# Patient Record
Sex: FEMALE | Race: BLACK OR AFRICAN AMERICAN | Employment: OTHER | URBAN - METROPOLITAN AREA
[De-identification: names, ages, dates, MRNs, and addresses within clinical notes are randomized per-mention and may not be internally consistent; named-entity substitution may affect disease eponyms.]

---

## 2017-03-14 ENCOUNTER — OFFICE VISIT (OUTPATIENT)
Dept: INTERNAL MEDICINE CLINIC | Age: 72
End: 2017-03-14

## 2017-04-03 ENCOUNTER — HOSPITAL ENCOUNTER (OUTPATIENT)
Dept: LAB | Age: 72
Discharge: HOME | End: 2017-04-03
Payer: MEDICARE

## 2017-04-03 DIAGNOSIS — E78.5 DYSLIPIDEMIA: ICD-10-CM

## 2017-04-03 LAB
CHOLEST SERPL-MCNC: 204 MG/DL
ERYTHROCYTE [DISTWIDTH] IN BLOOD BY AUTOMATED COUNT: 13.8 % (ref 11.6–14.5)
HCT VFR BLD AUTO: 37.1 % (ref 35–45)
HDLC SERPL-MCNC: 83 MG/DL (ref 40–60)
HDLC SERPL: 2.5 {RATIO} (ref 0–5)
HGB BLD-MCNC: 11.5 G/DL (ref 12–16)
LDLC SERPL CALC-MCNC: 107.2 MG/DL (ref 0–100)
LIPID PROFILE,FLP: ABNORMAL
MCH RBC QN AUTO: 29 PG (ref 24–34)
MCHC RBC AUTO-ENTMCNC: 31 G/DL (ref 31–37)
MCV RBC AUTO: 93.7 FL (ref 74–97)
PLATELET # BLD AUTO: 247 K/UL (ref 135–420)
PMV BLD AUTO: 10.8 FL (ref 9.2–11.8)
RBC # BLD AUTO: 3.96 M/UL (ref 4.2–5.3)
TRIGL SERPL-MCNC: 69 MG/DL (ref ?–150)
TSH SERPL DL<=0.05 MIU/L-ACNC: 0.73 UIU/ML (ref 0.36–3.74)
VLDLC SERPL CALC-MCNC: 13.8 MG/DL
WBC # BLD AUTO: 8.5 K/UL (ref 4.6–13.2)

## 2017-04-03 PROCEDURE — 85027 COMPLETE CBC AUTOMATED: CPT | Performed by: INTERNAL MEDICINE

## 2017-04-03 PROCEDURE — 36415 COLL VENOUS BLD VENIPUNCTURE: CPT | Performed by: INTERNAL MEDICINE

## 2017-04-03 PROCEDURE — 80061 LIPID PANEL: CPT | Performed by: INTERNAL MEDICINE

## 2017-04-03 PROCEDURE — 84443 ASSAY THYROID STIM HORMONE: CPT | Performed by: INTERNAL MEDICINE

## 2017-04-06 NOTE — PROGRESS NOTES
70 y.o. black female who presents for evaluation. She seems to be doing well. Doing an hour on the bike just about daily, she has not been going to silver sneakers    No GI or  complaints     No sx referable to the thyroid    Denies polyuria, polydipsia, nocturia, vision change. Not checking sugars at this time. Weight is back down, doiong better w diet    Vitals 4/10/2017 9/20/2016 3/16/2016 8/27/2015 5/6/2015   Weight 216 lb 226 lb 216 lb 216 lb 213 lb     Past Medical History:   Diagnosis Date    Allergic rhinitis     Anemia     chronic    Cataract     Chronic constipation     DJD (degenerative joint disease) of knee     Dr. Brendon Murray Dyslipidemia     10 year calculated risk score was 10.3% (7/13); 11.3% (10/14); 15.2% (3/16)    FHx: heart disease     Fibrocystic breast     neg bx x2 1970s    GERD (gastroesophageal reflux disease)     HTN (hypertension)     IFG (impaired fasting glucose) 2/14    Morbid obesity (HCC)     peak weight 230 lbs, bmi 38.8 from 10/11    Multinodular goiter 2007    negative biopsy Dr. Nuris Marti     Past Surgical History:   Procedure Laterality Date    DEXA BONE DENSITY STUDY AXIAL      DEXA t score 0.2 spine, -0.2 hip (9/09); -0.1 spine, -0.2 hip (3/14)    HX APPENDECTOMY      HX BREAST BIOPSY      1967 (RT)/ 1971 (LT)   Max Miller negative 2002, Dr. Martha Marin negative 4/3/12    HX DILATION AND CURETTAGE      x2     HX HEMORRHOIDECTOMY      HX HYSTERECTOMY      due to  leimyomata    HX PELVIC LAPAROSCOPY       Allergies   Allergen Reactions    Codeine Itching     \"Loopy\"     Current Outpatient Prescriptions   Medication Sig    pravastatin (PRAVACHOL) 20 mg tablet Take 1 Tab by mouth nightly.  triamterene-hydroCHLOROthiazide (MAXZIDE) 75-50 mg per tablet TAKE 1 TABLET EVERY DAY    fluticasone (FLONASE) 50 mcg/actuation nasal spray USE 2 SPRAYS IN EACH NOSTRIL DAILY     No current facility-administered medications for this visit.       LAST MEDICARE WELLNESS EXAM: 3/5/14, 8/27/15, 9/20/16  ACP 9/20/16    REVIEW OF SYSTEMS: gyn Dr Sewell Service 3/14, mammo 10/16, DEXA 3/14, colo 2012 Dr Ela Lyons, sees Dr Katia Singhtingham  Ophtho  no vision change or eye pain  Oral  no mouth pain, tongue or tooth problems  Ears  no hearing loss, ear pain, fullness, no swallowing problems  Cardiac  no CP, PND, orthopnea, edema, palpitations or syncope  Chest  no breast masses  Resp  no wheezing, chronic coughing, dyspnea  GI  no heartburn, nausea, vomiting, change in bowel habits, bleeding, hemorrhoids  Urinary  no dysuria, hematuria, flank pain, urgency, frequency  Genitals  no genital lesions, discharge, masses, ulceration, warts  Ortho  no swelling, dec ROM, myalgias  Derm  no nail abnormalities, rashes, lesions of note, hair loss  Psych  denies any anxiety or depression symptoms, no hallucinations or violent ideation  Constitutional  no wt loss, night sweats, unexplained fevers  Neuro  no focal weakness, numbness, paresthesias, incoordination, ataxia, involuntary movements  Endo - no polyuria, polydipsia, nocturia, hot flashes    Visit Vitals    /80    Pulse 91    Temp 97.9 °F (36.6 °C) (Oral)    Ht 5' 6\" (1.676 m)    Wt 216 lb (98 kg)    SpO2 94%    BMI 34.86 kg/m2     A&O x3  Affect is appropriate. Mood stable  No apparent distress  Anicteric, no JVD, adenopathy. Goiter noted   No carotid bruits or radiated murmur  Lungs clear to auscultation, no wheezes or rales  Heart showed regular rate and rhythm. No murmur, rubs, gallops  Abdomen soft nontender, no hepatosplenomegaly or masses. Extremities without edema.   Pulses 1-2+ symmetrically    LABS  From 12/08                                      ua neg  From 7/09 showed   gluc 98,   cr 0.90,     alt 10,          chol 223, tg 75, hdl 71, ldl-c 137  From 12/11 showed               ldl-p 1525, chol 247, tg 61, hdl 78, ldl-c 157, wbc 7.6, hb 11.7, plt 265  From 6/12 showed                   ldl-p 1177, chol 238, tg 48, hdl 78, ldl-c 152,           tsh 1.81  From 1/13 showed   gluc 99,   cr 0.95, gfr 72,  alt<5,  ldl-p 1238, chol 206, tg 56, hdl 75, ldl-c 120, wbc 8.2, hb 11.4, plt 259  From 7/13 showed               chol 221, tg 72, hdl 72, ldl-c 135  From 2/14 showed   gluc 101, cr 0.78, gfr 90,                   tsh 2.04  From 10/14 showed gluc 94,   cr 1.02, gfr>60, alt 5,   hba1c 6.2, chol 231, tg 73, hdl 83, ldl-c 133, wbc 7.9, hb 11.9, plt 281  From 3/16 showed   gluc 107, cr 0.75, gfr>60, alt 11, hba1c 5.2, chol 229, tg 74, hdl 90, ldl-c 124, wbc 7.6, hb 12.0, plt 256, tsh 1.22, ua neg  From 9/16 showed   gluc 105, cr 0.89, gfr>60, alt 14,         chol 198, tg 54, hdl 93, ldl-c 94    Results for orders placed or performed during the hospital encounter of 04/03/17   LIPID PANEL   Result Value Ref Range    LIPID PROFILE          Cholesterol, total 204 (H) <200 MG/DL    Triglyceride 69 <150 MG/DL    HDL Cholesterol 83 (H) 40 - 60 MG/DL    LDL, calculated 107.2 (H) 0 - 100 MG/DL    VLDL, calculated 13.8 MG/DL    CHOL/HDL Ratio 2.5 0 - 5.0     CBC W/O DIFF   Result Value Ref Range    WBC 8.5 4.6 - 13.2 K/uL    RBC 3.96 (L) 4.20 - 5.30 M/uL    HGB 11.5 (L) 12.0 - 16.0 g/dL    HCT 37.1 35.0 - 45.0 %    MCV 93.7 74.0 - 97.0 FL    MCH 29.0 24.0 - 34.0 PG    MCHC 31.0 31.0 - 37.0 g/dL    RDW 13.8 11.6 - 14.5 %    PLATELET 465 619 - 895 K/uL    MPV 10.8 9.2 - 11.8 FL   TSH 3RD GENERATION   Result Value Ref Range    TSH 0.73 0.36 - 3.74 uIU/mL     Patient Active Problem List   Diagnosis Code    Multinodular goiter neg biopsy 2007 Dr. Arredondo Pay E04.2    Dyslipidemia E78.5    IFG (impaired fasting glucose) R73.01    Essential hypertension I10    Degenerative arthritis of knee, bilateral M17.0    GERD without esophagitis K21.9    Advance directive in chart Z78.9    Obesity (BMI 30-39. 9) E66.9     ASSESSMENT AND PLAN:   1. Hypertension. Continue current regimen. 2.  Obesity. Diet and lifestyle changes reiterated. Congratulated her on the wt loss she did attain. Declined flavio suppressants previously  3. Allergic rhinitis. Prn antihistamines and flonase  4. Fibrocystic breasts. F/U GYN and mammos  5. DJD. Prn nsaids  6. General.  Ca/D advocated. 7.  Thyroid. Doing well  8. Dyslipidemia. Inc to prava 20  9. IFG. Wt loss, exercise and diet, chck labs at her convenience        RTC 10/17    Above conditions discussed at length and patient vocalized understanding.   All questions answered to patient satifaction

## 2017-04-10 ENCOUNTER — OFFICE VISIT (OUTPATIENT)
Dept: INTERNAL MEDICINE CLINIC | Age: 72
End: 2017-04-10

## 2017-04-10 VITALS
HEART RATE: 91 BPM | WEIGHT: 216 LBS | HEIGHT: 66 IN | OXYGEN SATURATION: 94 % | SYSTOLIC BLOOD PRESSURE: 126 MMHG | DIASTOLIC BLOOD PRESSURE: 80 MMHG | BODY MASS INDEX: 34.72 KG/M2 | TEMPERATURE: 97.9 F

## 2017-04-10 DIAGNOSIS — K21.9 GERD WITHOUT ESOPHAGITIS: ICD-10-CM

## 2017-04-10 DIAGNOSIS — E78.5 DYSLIPIDEMIA: Primary | ICD-10-CM

## 2017-04-10 DIAGNOSIS — D64.9 CHRONIC ANEMIA: ICD-10-CM

## 2017-04-10 DIAGNOSIS — R73.01 IFG (IMPAIRED FASTING GLUCOSE): ICD-10-CM

## 2017-04-10 DIAGNOSIS — E66.9 OBESITY (BMI 30-39.9): ICD-10-CM

## 2017-04-10 DIAGNOSIS — E04.2 MULTINODULAR GOITER: ICD-10-CM

## 2017-04-10 DIAGNOSIS — I10 ESSENTIAL HYPERTENSION: ICD-10-CM

## 2017-04-10 RX ORDER — PRAVASTATIN SODIUM 20 MG/1
20 TABLET ORAL
Qty: 90 TAB | Refills: 3 | Status: SHIPPED | OUTPATIENT
Start: 2017-04-10 | End: 2018-03-14 | Stop reason: SDUPTHER

## 2017-04-10 NOTE — PROGRESS NOTES
1. Have you been to the ER, urgent care clinic or hospitalized since your last visit? NO.     2. Have you seen or consulted any other health care providers outside of the [address] [place] [address] since your last visit (Include any pap smears or colon screening)? NO      Do you have an Advanced Directive? NO    Would you like information on Advanced Directives?  YES

## 2017-10-09 ENCOUNTER — HOSPITAL ENCOUNTER (OUTPATIENT)
Dept: LAB | Age: 72
Discharge: HOME | End: 2017-10-09
Payer: MEDICARE

## 2017-10-09 DIAGNOSIS — D64.9 CHRONIC ANEMIA: ICD-10-CM

## 2017-10-09 DIAGNOSIS — E78.5 DYSLIPIDEMIA: ICD-10-CM

## 2017-10-09 DIAGNOSIS — R73.01 IFG (IMPAIRED FASTING GLUCOSE): ICD-10-CM

## 2017-10-09 LAB
ALBUMIN SERPL-MCNC: 3.6 G/DL (ref 3.4–5)
ALBUMIN/GLOB SERPL: 1.1 {RATIO} (ref 0.8–1.7)
ALP SERPL-CCNC: 99 U/L (ref 45–117)
ALT SERPL-CCNC: 14 U/L (ref 13–56)
ANION GAP SERPL CALC-SCNC: 9 MMOL/L (ref 3–18)
AST SERPL-CCNC: 11 U/L (ref 15–37)
BILIRUB SERPL-MCNC: 0.6 MG/DL (ref 0.2–1)
BUN SERPL-MCNC: 19 MG/DL (ref 7–18)
BUN/CREAT SERPL: 21 (ref 12–20)
CALCIUM SERPL-MCNC: 9.2 MG/DL (ref 8.5–10.1)
CHLORIDE SERPL-SCNC: 102 MMOL/L (ref 100–108)
CHOLEST SERPL-MCNC: 216 MG/DL
CO2 SERPL-SCNC: 31 MMOL/L (ref 21–32)
CREAT SERPL-MCNC: 0.9 MG/DL (ref 0.6–1.3)
ERYTHROCYTE [DISTWIDTH] IN BLOOD BY AUTOMATED COUNT: 13.9 % (ref 11.6–14.5)
GLOBULIN SER CALC-MCNC: 3.4 G/DL (ref 2–4)
GLUCOSE SERPL-MCNC: 107 MG/DL (ref 74–99)
HBA1C MFR BLD: 5.9 % (ref 4.2–5.6)
HCT VFR BLD AUTO: 37.7 % (ref 35–45)
HDLC SERPL-MCNC: 99 MG/DL (ref 40–60)
HDLC SERPL: 2.2 {RATIO} (ref 0–5)
HGB BLD-MCNC: 11.7 G/DL (ref 12–16)
LDLC SERPL CALC-MCNC: 103.4 MG/DL (ref 0–100)
LIPID PROFILE,FLP: ABNORMAL
MCH RBC QN AUTO: 28.8 PG (ref 24–34)
MCHC RBC AUTO-ENTMCNC: 31 G/DL (ref 31–37)
MCV RBC AUTO: 92.9 FL (ref 74–97)
PLATELET # BLD AUTO: 262 K/UL (ref 135–420)
PMV BLD AUTO: 10.9 FL (ref 9.2–11.8)
POTASSIUM SERPL-SCNC: 3.2 MMOL/L (ref 3.5–5.5)
PROT SERPL-MCNC: 7 G/DL (ref 6.4–8.2)
RBC # BLD AUTO: 4.06 M/UL (ref 4.2–5.3)
SODIUM SERPL-SCNC: 142 MMOL/L (ref 136–145)
TRIGL SERPL-MCNC: 68 MG/DL (ref ?–150)
VLDLC SERPL CALC-MCNC: 13.6 MG/DL
WBC # BLD AUTO: 6.8 K/UL (ref 4.6–13.2)

## 2017-10-09 PROCEDURE — 80053 COMPREHEN METABOLIC PANEL: CPT | Performed by: INTERNAL MEDICINE

## 2017-10-09 PROCEDURE — 36415 COLL VENOUS BLD VENIPUNCTURE: CPT | Performed by: INTERNAL MEDICINE

## 2017-10-09 PROCEDURE — 83036 HEMOGLOBIN GLYCOSYLATED A1C: CPT | Performed by: INTERNAL MEDICINE

## 2017-10-09 PROCEDURE — 80061 LIPID PANEL: CPT | Performed by: INTERNAL MEDICINE

## 2017-10-09 PROCEDURE — 85027 COMPLETE CBC AUTOMATED: CPT | Performed by: INTERNAL MEDICINE

## 2017-10-16 NOTE — PROGRESS NOTES
This is a Subsequent Medicare Annual Wellness Visit     I have reviewed the patient's medical history in detail and updated the computerized patient record. History     Past Medical History:   Diagnosis Date    Allergic rhinitis     Anemia     chronic    Cataract     Chronic constipation     DJD (degenerative joint disease) of knee     Dr. Lorne Cross Dyslipidemia     10 year calculated risk score was 10.3% (7/13); 11.3% (10/14); 15.2% (3/16)    FHx: heart disease     Fibrocystic breast     neg bx x2 1970s    GERD (gastroesophageal reflux disease)     HTN (hypertension)     IFG (impaired fasting glucose) 2/14    Morbid obesity (HCC)     peak weight 230 lbs, bmi 38.8 from 10/11    Multinodular goiter 2007    negative biopsy Dr. Minh Pedersen      Past Surgical History:   Procedure Laterality Date    DEXA BONE DENSITY STUDY AXIAL      DEXA t score 0.2 spine, -0.2 hip (9/09); -0.1 spine, -0.2 hip (3/14)    HX APPENDECTOMY      HX BREAST BIOPSY      1967 (RT)/ 1971 (LT)   Claudia Proud Dr. Burgess Sandifer negative 2002, Dr. Mami Canela negative 4/3/12    HX DILATION AND CURETTAGE      x2     HX HEMORRHOIDECTOMY      HX HYSTERECTOMY      due to  leimyomata    HX PELVIC LAPAROSCOPY       Current Outpatient Prescriptions   Medication Sig Dispense Refill    pravastatin (PRAVACHOL) 20 mg tablet Take 1 Tab by mouth nightly.  90 Tab 3    triamterene-hydroCHLOROthiazide (MAXZIDE) 75-50 mg per tablet TAKE 1 TABLET EVERY DAY 90 Tab 3    fluticasone (FLONASE) 50 mcg/actuation nasal spray USE 2 SPRAYS IN EACH NOSTRIL DAILY 48 g 3     Allergies   Allergen Reactions    Codeine Itching     \"Loopy\"     Family History   Problem Relation Age of Onset    Hypertension Mother     Hypertension Father     Cancer Father      pancreatic    Hypertension Brother     Cancer Brother 46     lung    Hypertension Sister     Diabetes Brother     Heart Disease Sister      Social History   Substance Use Topics    Smoking status: Never Smoker    Smokeless tobacco: Never Used    Alcohol use No     Depression Risk Factor Screening:     PHQ over the last two weeks 10/17/2017   Little interest or pleasure in doing things Not at all   Feeling down, depressed or hopeless Not at all   Total Score PHQ 2 0     SCREENINGS  Colonoscopy last done 2012 Dr Suhas Felix last done 10/17  DEXA last done 3/14  Gyn last done >5 yrs    Immunization History   Administered Date(s) Administered    H1N1 Influenza Virus Vaccine 12/01/2009    Influenza High Dose Vaccine PF 11/19/2013, 09/20/2016    Influenza Vaccine 10/31/2014, 11/19/2015    Influenza Vaccine Whole 09/20/2011    PPD 09/24/2012    Pneumococcal Conjugate (PCV-13) 08/27/2015    TD Vaccine 01/01/1999    Tdap 10/01/2015    ZZZ-RETIRED (DO NOT USE) Pneumococcal Vaccine (Unspecified Type) 12/20/2011    Zoster 12/20/2011     Alcohol Risk Factor Screening: On any occasion during the past 3 months, have you had more than 3 drinks containing alcohol? No    Do you average more than 7 drinks per week? No      Functional Ability and Level of Safety:     Hearing Loss   normal-to-mild    Vision - no acute complaints and is followed by Dr Chirinos [facility]   Self-care. Requires assistance with: no ADLs    Fall Risk     Fall Risk Assessment, last 12 mths 10/17/2017   Able to walk? Yes   Fall in past 12 months? No     Abuse Screen   Patient is not abused    Review of Systems   A comprehensive review of systems was negative except for that written in the HPI.     Physical Examination     Evaluation of Cognitive Function:  Mood/affect:  neutral  Appearance: age appropriate, overweight, well dressed and within normal Limits  Family member/caregiver input: na    Patient Care Team:  Julien Malik MD as PCP - General (Internal Medicine)  Varsha Rubio RN as Ambulatory Care Navigator (Internal Medicine)    Advice/Referrals/Counseling   Education and counseling provided:  Are appropriate based on today's review and evaluation  End-of-Life planning (with patient's consent)  Pneumococcal Vaccine  Influenza Vaccine  Screening Mammography  Colorectal cancer screening tests  Cardiovascular screening blood test  Bone mass measurement (DEXA)  Diabetes screening test    Assessment/Plan       ICD-10-CM ICD-9-CM    1. Medicare annual wellness visit, subsequent Z00.00 V70.0    2. Advanced directives, counseling/discussion Z71.89 V65.49 ADVANCE CARE PLANNING FIRST 30 MINS   3. Screening for alcoholism Z13.89 V79.1 FL ANNUAL ALCOHOL SCREEN 15 MIN   4. Screening for depression Z13.89 V79.0 DEPRESSION SCREEN ANNUAL   5. Screen for colon cancer Z12.11 V76.51    6. Screening for diabetes mellitus Z13.1 V77.1    7. Screening for ischemic heart disease Z13.6 V81.0    8. Encounter for screening mammogram for malignant neoplasm of breast Z12.31 V76.12    9. Body mass index 34.0-34.9, adult Z68.34 V85.34    10. Encounter for immunization Z23 V03.89 INFLUENZA VIRUS VACCINE, HIGH DOSE SEASONAL, PRESERVATIVE FREE      ADMIN INFLUENZA VIRUS VAC   11. Obesity (BMI 30-39. 9) E66.9 278.00    12. Essential hypertension I10 401.9    13. GERD without esophagitis K21.9 530.81    14. IFG (impaired fasting glucose) R73.01 790.21 HEMOGLOBIN A1C W/O EAG   15. Dyslipidemia E78.5 272.4    16. Hypokalemia C45.4 314.5 METABOLIC PANEL, BASIC      MAGNESIUM   17. Multinodular goiter neg biopsy 2007 Dr. Rudolph Bone E04.2 241.1 TSH 3RD GENERATION      T4, FREE     current treatment plan is effective, no change in therapy  lab results and schedule of future lab studies reviewed with patient  reviewed diet, exercise and weight control  cardiovascular risk and specific lipid/LDL goals reviewed. End of life discussion undertaken.   amd on file  Flu high dose given  Colonoscopy to be scheduled 2022  Mammo to be scheduled 2018  DEXA deferred til 2019 at earliest

## 2017-10-16 NOTE — PATIENT INSTRUCTIONS

## 2017-10-16 NOTE — PROGRESS NOTES
70 y.o. black female who presents for evaluation. She seems to be doing well. Doing an hour on the bike just about daily, and she continues to try to go to the [facility] for silver sneakers although not as regular with that. Her daughter boiught her a Fitbit but not wearing regularly, she averages around 8K steps when she does    No GI or  complaints     No sx referable to the thyroid    Denies polyuria, polydipsia, nocturia, vision change. Not checking sugars at this time. Weight has continued to go down the past year, 22 lbs now! They're not eating out as much as they used to apparently and the increased activity mentioned above    Vitals 10/17/2017 4/10/2017 9/20/2016   Weight 204 lb 216 lb 226 lb     Past Medical History:   Diagnosis Date    Allergic rhinitis     Anemia     chronic    Cataract     Chronic constipation     DJD (degenerative joint disease) of knee     Dr. Stella Palencia Dyslipidemia     10 year calculated risk score was 10.3% (7/13); 11.3% (10/14); 15.2% (3/16)    FHx: heart disease     Fibrocystic breast     neg bx x2 1970s    GERD (gastroesophageal reflux disease)     HTN (hypertension)     IFG (impaired fasting glucose) 2/14    Morbid obesity (HCC)     peak weight 230 lbs, bmi 38.8 from 10/11    Multinodular goiter 2007    negative biopsy Dr. Chapito Alvarez     Past Surgical History:   Procedure Laterality Date    DEXA BONE DENSITY STUDY AXIAL      DEXA t score 0.2 spine, -0.2 hip (9/09); -0.1 spine, -0.2 hip (3/14)    HX APPENDECTOMY      HX BREAST BIOPSY      1967 (RT)/ 1971 (LT)   Donna Foreman negative 2002, Dr. Darshana Marino negative 4/3/12    HX DILATION AND CURETTAGE      x2     HX HEMORRHOIDECTOMY      HX HYSTERECTOMY      due to  leimyomata    HX PELVIC LAPAROSCOPY       Allergies   Allergen Reactions    Codeine Itching     \"Loopy\"     Current Outpatient Prescriptions   Medication Sig    pravastatin (PRAVACHOL) 20 mg tablet Take 1 Tab by mouth nightly.     triamterene-hydroCHLOROthiazide (MAXZIDE) 75-50 mg per tablet TAKE 1 TABLET EVERY DAY    fluticasone (FLONASE) 50 mcg/actuation nasal spray USE 2 SPRAYS IN EACH NOSTRIL DAILY     No current facility-administered medications for this visit. LAST MEDICARE WELLNESS EXAM: 3/5/14, 8/27/15, 9/20/16, 10/17/17  ACP 9/20/16, 10/17/17    REVIEW OF SYSTEMS: gyn Dr Deanna Boyer 3/14, mammo 10/17, DEXA 3/14, colo 2012 Dr José Garay, sees Dr Nory Jeff  Ophtho  no vision change or eye pain  Oral  no mouth pain, tongue or tooth problems  Ears  no hearing loss, ear pain, fullness, no swallowing problems  Cardiac  no CP, PND, orthopnea, edema, palpitations or syncope  Chest  no breast masses  Resp  no wheezing, chronic coughing, dyspnea  GI  no heartburn, nausea, vomiting, change in bowel habits, bleeding, hemorrhoids  Urinary  no dysuria, hematuria, flank pain, urgency, frequency  Genitals  no genital lesions, discharge, masses, ulceration, warts  Ortho  no swelling, dec ROM, myalgias  Derm  no nail abnormalities, rashes, lesions of note, hair loss  Psych  denies any anxiety or depression symptoms, no hallucinations or violent ideation  Constitutional  no wt loss, night sweats, unexplained fevers  Neuro  no focal weakness, numbness, paresthesias, incoordination, ataxia, involuntary movements  Endo - no polyuria, polydipsia, nocturia, hot flashes    Visit Vitals    /82    Pulse 69    Temp 98.8 °F (37.1 °C)    Resp 14    Ht 5' 6\" (1.676 m)    Wt 204 lb (92.5 kg)    SpO2 99%    BMI 32.93 kg/m2     A&O x3  Affect is appropriate. Mood stable  No apparent distress  Anicteric, no JVD, adenopathy. Goiter noted   No carotid bruits or radiated murmur  Lungs clear to auscultation, no wheezes or rales  Heart showed regular rate and rhythm. No murmur, rubs, gallops  Abdomen soft nontender, no hepatosplenomegaly or masses. Extremities without edema.   Pulses 1-2+ symmetrically    LABS  From 12/08 ua neg  From 7/09 showed   gluc 98,   cr 0.90,     alt 10,          chol 223, tg 75, hdl 71, ldl-c 137  From 12/11 showed               ldl-p 1525, chol 247, tg 61, hdl 78, ldl-c 157, wbc 7.6, hb 11.7, plt 265  From 6/12 showed                   ldl-p 1177, chol 238, tg 48, hdl 78, ldl-c 152,           tsh 1.81  From 1/13 showed   gluc 99,   cr 0.95, gfr 72,  alt<5,  ldl-p 1238, chol 206, tg 56, hdl 75, ldl-c 120, wbc 8.2, hb 11.4, plt 259  From 7/13 showed               chol 221, tg 72, hdl 72, ldl-c 135  From 2/14 showed   gluc 101, cr 0.78, gfr 90,                   tsh 2.04  From 10/14 showed gluc 94,   cr 1.02, gfr>60, alt 5,   hba1c 6.2, chol 231, tg 73, hdl 83, ldl-c 133, wbc 7.9, hb 11.9, plt 281  From 3/16 showed   gluc 107, cr 0.75, gfr>60, alt 11, hba1c 5.2, chol 229, tg 74, hdl 90, ldl-c 124, wbc 7.6, hb 12.0, plt 256, tsh 1.22, ua neg  From 9/16 showed   gluc 105, cr 0.89, gfr>60, alt 14,         chol 198, tg 54, hdl 93, ldl-c 94  From 4/17 showed               chol 204, tg 69, hdl 83, ldl-c 107, wbc 8.5, hb 11.5, plt 247, tsh 0.73    Results for orders placed or performed during the hospital encounter of 10/09/17   LIPID PANEL   Result Value Ref Range    LIPID PROFILE          Cholesterol, total 216 (H) <200 MG/DL    Triglyceride 68 <150 MG/DL    HDL Cholesterol 99 (H) 40 - 60 MG/DL    LDL, calculated 103.4 (H) 0 - 100 MG/DL    VLDL, calculated 13.6 MG/DL    CHOL/HDL Ratio 2.2 0 - 5.0     CBC W/O DIFF   Result Value Ref Range    WBC 6.8 4.6 - 13.2 K/uL    RBC 4.06 (L) 4.20 - 5.30 M/uL    HGB 11.7 (L) 12.0 - 16.0 g/dL    HCT 37.7 35.0 - 45.0 %    MCV 92.9 74.0 - 97.0 FL    MCH 28.8 24.0 - 34.0 PG    MCHC 31.0 31.0 - 37.0 g/dL    RDW 13.9 11.6 - 14.5 %    PLATELET 193 148 - 888 K/uL    MPV 10.9 9.2 - 56.0 FL   METABOLIC PANEL, COMPREHENSIVE   Result Value Ref Range    Sodium 142 136 - 145 mmol/L    Potassium 3.2 (L) 3.5 - 5.5 mmol/L    Chloride 102 100 - 108 mmol/L    CO2 31 21 - 32 mmol/L Anion gap 9 3.0 - 18 mmol/L    Glucose 107 (H) 74 - 99 mg/dL    BUN 19 (H) 7.0 - 18 MG/DL    Creatinine 0.90 0.6 - 1.3 MG/DL    BUN/Creatinine ratio 21 (H) 12 - 20      GFR est AA >60 >60 ml/min/1.73m2    GFR est non-AA >60 >60 ml/min/1.73m2    Calcium 9.2 8.5 - 10.1 MG/DL    Bilirubin, total 0.6 0.2 - 1.0 MG/DL    ALT (SGPT) 14 13 - 56 U/L    AST (SGOT) 11 (L) 15 - 37 U/L    Alk. phosphatase 99 45 - 117 U/L    Protein, total 7.0 6.4 - 8.2 g/dL    Albumin 3.6 3.4 - 5.0 g/dL    Globulin 3.4 2.0 - 4.0 g/dL    A-G Ratio 1.1 0.8 - 1.7     HEMOGLOBIN A1C W/O EAG   Result Value Ref Range    Hemoglobin A1c 5.9 (H) 4.2 - 5.6 %     Patient Active Problem List   Diagnosis Code    Multinodular goiter neg biopsy 2007 Dr. Minh Pedersen E04.2    Dyslipidemia E78.5    IFG (impaired fasting glucose) R73.01    Essential hypertension I10    Degenerative arthritis of knee, bilateral M17.0    GERD without esophagitis K21.9    Advance directive in chart Z78.9    Obesity (BMI 30-39. 9) E66.9     ASSESSMENT AND PLAN:   1. Hypertension. Continue current regimen. 2.  Obesity. Diet and lifestyle changes reiterated. Congratulated her on the wt loss. Declined flavio suppressants previously  3. Allergic rhinitis. Prn antihistamines and flonase  4. Fibrocystic breasts. F/U GYN and mammos  5. DJD. Prn nsaids  6. General.  Ca/D advocated. 7.  Thyroid. Doing well  8. Dyslipidemia. Continue current regimen. 9.  IFG. Wt loss, exercise and diet, doing well  10. Hypokalemia. Declined kdur, she wants to try otc supp and inc dietary k intake. Check mg next visit also  11. In passing, we discussed options for her right bunion which is really more a cosmetic issue at this time        RTC 4/18    Above conditions discussed at length and patient vocalized understanding.   All questions answered to patient satifaction

## 2017-10-16 NOTE — PROGRESS NOTES
This is a Subsequent Medicare Annual Wellness     I have reviewed the patient's medical history in detail and updated the computerized patient record. History     Past Medical History:   Diagnosis Date    Allergic rhinitis     Anemia     chronic    Cataract     Chronic constipation     DJD (degenerative joint disease) of knee     Dr. Alina Kumari Dyslipidemia     10 year calculated risk score was 10.3% (7/13); 11.3% (10/14); 15.2% (3/16)    FHx: heart disease     Fibrocystic breast     neg bx x2 1970s    GERD (gastroesophageal reflux disease)     HTN (hypertension)     IFG (impaired fasting glucose) 2/14    Morbid obesity (HCC)     peak weight 230 lbs, bmi 38.8 from 10/11    Multinodular goiter 2007    negative biopsy Dr. Sandra Cosby      Past Surgical History:   Procedure Laterality Date    DEXA BONE DENSITY STUDY AXIAL      DEXA t score 0.2 spine, -0.2 hip (9/09); -0.1 spine, -0.2 hip (3/14)    HX APPENDECTOMY      HX BREAST BIOPSY      1967 (RT)/ 1971 (LT)   Marie Maguire negative 2002, Dr. Blanca Berman negative 4/3/12    HX DILATION AND CURETTAGE      x2     HX HEMORRHOIDECTOMY      HX HYSTERECTOMY      due to  leimyomata    HX PELVIC LAPAROSCOPY       Current Outpatient Prescriptions   Medication Sig Dispense Refill    pravastatin (PRAVACHOL) 20 mg tablet Take 1 Tab by mouth nightly.  90 Tab 3    triamterene-hydroCHLOROthiazide (MAXZIDE) 75-50 mg per tablet TAKE 1 TABLET EVERY DAY 90 Tab 3    fluticasone (FLONASE) 50 mcg/actuation nasal spray USE 2 SPRAYS IN EACH NOSTRIL DAILY 48 g 3     Allergies   Allergen Reactions    Codeine Itching     \"Loopy\"     Family History   Problem Relation Age of Onset    Hypertension Mother     Hypertension Father     Cancer Father      pancreatic    Hypertension Brother     Cancer Brother 46     lung    Hypertension Sister     Diabetes Brother     Heart Disease Sister      Social History   Substance Use Topics    Smoking status: Never Smoker    Smokeless tobacco: Never Used    Alcohol use No     Depression Risk Factor Screening:     PHQ over the last two weeks 4/10/2017   Little interest or pleasure in doing things Not at all   Feeling down, depressed or hopeless Not at all   Total Score PHQ 2 0     SCREENINGS  Colonoscopy last done 2012 Dr Bobby Aguila last done 10/17  DEXA last done 3/14  Gyn last done >5 yrs    Immunization History   Administered Date(s) Administered    H1N1 Influenza Virus Vaccine 12/01/2009    Influenza High Dose Vaccine PF 11/19/2013, 09/20/2016    Influenza Vaccine 10/31/2014, 11/19/2015    Influenza Vaccine Whole 09/20/2011    PPD 09/24/2012    Pneumococcal Conjugate (PCV-13) 08/27/2015    TD Vaccine 01/01/1999    Tdap 10/01/2015    ZZZ-RETIRED (DO NOT USE) Pneumococcal Vaccine (Unspecified Type) 12/20/2011    Zoster 12/20/2011     Alcohol Risk Factor Screening: On any occasion during the past 3 months, have you had more than 3 drinks containing alcohol? No    Do you average more than 7 drinks per week? No      Functional Ability and Level of Safety:     Hearing Loss   normal-to-mild    Vision - no acute complaints and is followed by Dr Navdeep Morel [facility]   Self-care. Requires assistance with: no ADLs    Fall Risk     Fall Risk Assessment, last 12 mths 4/10/2017   Able to walk? Yes   Fall in past 12 months? No     Abuse Screen   Patient is not abused    Review of Systems   A comprehensive review of systems was negative except for that written in the HPI.     Physical Examination     Evaluation of Cognitive Function:  Mood/affect:  neutral  Appearance: age appropriate, overweight, well dressed and within normal Limits  Family member/caregiver input: na    Patient Care Team:  Martha Nguyen MD as PCP - General (Internal Medicine)  Ria Greenwood RN as Ambulatory Care Navigator (Internal Medicine)    Advice/Referrals/Counseling   Education and counseling provided:  Are appropriate based on today's review and evaluation  End-of-Life planning (with patient's consent)  Pneumococcal Vaccine  Influenza Vaccine  Screening Mammography  Colorectal cancer screening tests  Cardiovascular screening blood test  Bone mass measurement (DEXA)  Diabetes screening test    Assessment/Plan       ICD-10-CM ICD-9-CM    1. Medicare annual wellness visit, subsequent Z00.00 V70.0    2. Advanced directives, counseling/discussion Z71.89 V65.49 ADVANCE CARE PLANNING FIRST 30 MINS   3. Screening for alcoholism Z13.89 V79.1 DC ANNUAL ALCOHOL SCREEN 15 MIN   4. Screening for depression Z13.89 V79.0 DEPRESSION SCREEN ANNUAL   5. Screen for colon cancer Z12.11 V76.51    6. Screening for diabetes mellitus Z13.1 V77.1    7. Screening for ischemic heart disease Z13.6 V81.0    8. Encounter for screening mammogram for malignant neoplasm of breast Z12.31 V76.12    9. Body mass index 34.0-34.9, adult Z68.34 V85.34      current treatment plan is effective, no change in therapy  lab results and schedule of future lab studies reviewed with patient  reviewed diet, exercise and weight control  cardiovascular risk and specific lipid/LDL goals reviewed. End of life discussion undertaken.   amd on file  Flu high dose when in season  Colonoscopy to be scheduled 2022  Mammo to be scheduled 2018  DEXA deferred

## 2017-10-16 NOTE — ACP (ADVANCE CARE PLANNING)
Advance Care Planning    Advance Care Planning (ACP) Provider Note - Comprehensive     Date of ACP Conversation: 10/17/17  Persons included in Conversation:  patient  Length of ACP Conversation in minutes:  16 minutes    Authorized Decision Maker (if patient is incapable of making informed decisions): This person is:   Healthcare Agent/Medical Power of  under Advance Directive          General ACP for ALL Patients with Decision Making Capacity:   Importance of advance care planning, including choosing a healthcare agent to communicate patient's healthcare decisions if patient lost the ability to make decisions, such as after a sudden illness or accident  Understanding of the healthcare agent role was assessed and information provided  Exploration of values, goals, and preferences if recovery is not expected, even with continued medical treatment in the event of: Imminent death  Severe, permanent brain injury    Review of Existing Advance Directive:  Does this advance directive still reflect your preferences? Yes (Provide new form/Refer for assistance in updating)    For Serious or Chronic Illness:  Understanding of medical condition    Understanding of CPR, goals and expected outcomes, benefits and burdens discussed.     Interventions Provided:  Recommended completion of Advance Directive form after review of ACP materials and conversation with prospective healthcare agent   Recommended communicating the plan and making copies for the healthcare agent, personal physician, and others as appropriate (e.g., health system)  Recommended review of completed ACP document annually or upon change in health status

## 2017-10-17 ENCOUNTER — OFFICE VISIT (OUTPATIENT)
Dept: INTERNAL MEDICINE CLINIC | Age: 72
End: 2017-10-17

## 2017-10-17 VITALS
OXYGEN SATURATION: 99 % | TEMPERATURE: 98.8 F | DIASTOLIC BLOOD PRESSURE: 82 MMHG | SYSTOLIC BLOOD PRESSURE: 118 MMHG | WEIGHT: 204 LBS | HEIGHT: 66 IN | HEART RATE: 69 BPM | RESPIRATION RATE: 14 BRPM | BODY MASS INDEX: 32.78 KG/M2

## 2017-10-17 DIAGNOSIS — R73.01 IFG (IMPAIRED FASTING GLUCOSE): ICD-10-CM

## 2017-10-17 DIAGNOSIS — E04.2 MULTINODULAR GOITER: ICD-10-CM

## 2017-10-17 DIAGNOSIS — Z13.39 SCREENING FOR ALCOHOLISM: ICD-10-CM

## 2017-10-17 DIAGNOSIS — Z12.11 SCREEN FOR COLON CANCER: ICD-10-CM

## 2017-10-17 DIAGNOSIS — E87.6 HYPOKALEMIA: ICD-10-CM

## 2017-10-17 DIAGNOSIS — E78.5 DYSLIPIDEMIA: ICD-10-CM

## 2017-10-17 DIAGNOSIS — Z13.1 SCREENING FOR DIABETES MELLITUS: ICD-10-CM

## 2017-10-17 DIAGNOSIS — Z12.31 ENCOUNTER FOR SCREENING MAMMOGRAM FOR MALIGNANT NEOPLASM OF BREAST: ICD-10-CM

## 2017-10-17 DIAGNOSIS — Z71.89 ADVANCED DIRECTIVES, COUNSELING/DISCUSSION: ICD-10-CM

## 2017-10-17 DIAGNOSIS — K21.9 GERD WITHOUT ESOPHAGITIS: ICD-10-CM

## 2017-10-17 DIAGNOSIS — Z13.6 SCREENING FOR ISCHEMIC HEART DISEASE: ICD-10-CM

## 2017-10-17 DIAGNOSIS — Z13.31 SCREENING FOR DEPRESSION: ICD-10-CM

## 2017-10-17 DIAGNOSIS — E66.9 OBESITY (BMI 30-39.9): ICD-10-CM

## 2017-10-17 DIAGNOSIS — I10 ESSENTIAL HYPERTENSION: ICD-10-CM

## 2017-10-17 DIAGNOSIS — Z23 ENCOUNTER FOR IMMUNIZATION: ICD-10-CM

## 2017-10-17 DIAGNOSIS — Z00.00 MEDICARE ANNUAL WELLNESS VISIT, SUBSEQUENT: Primary | ICD-10-CM

## 2017-10-17 NOTE — PROGRESS NOTES
VO from Dr. Flores Call for High Dose Influenza. High Dose Influenza given in Left Deltoid. No pain or reactions noted at injection site.

## 2017-10-17 NOTE — PROGRESS NOTES
1. Have you been to the ER, urgent care clinic or hospitalized since your last visit? NO.     2. Have you seen or consulted any other health care providers outside of the [address] [place] [address] since your last visit (Include any pap smears or colon screening)? NO      Do you have an Advanced Directive? YES    Would you like information on Advanced Directives?  NO

## 2017-10-17 NOTE — MR AVS SNAPSHOT
Visit Information Date & Time Provider Department Dept. Phone Encounter #  
 10/17/2017  9:30 AM Hillary Bill MD [facility] [phone] [id number] Upcoming Health Maintenance Date Due INFLUENZA AGE 9 TO ADULT 8/1/2017 GLAUCOMA SCREENING Q2Y 9/15/2018 MEDICARE YEARLY EXAM 10/18/2018 BREAST CANCER SCRN MAMMOGRAM 10/24/2018 COLONOSCOPY 5/1/2022 DTaP/Tdap/Td series (2 - Td) 10/1/2025 Allergies as of 10/17/2017  Review Complete On: 10/17/2017 By: Hillary Bill MD  
  
 Severity Noted Reaction Type Reactions Codeine  06/25/2012    Itching \"Loopy\" Current Immunizations  Reviewed on 9/20/2016 Name Date H1N1 FLU VACCINE 12/1/2009 Influenza High Dose Vaccine PF  Incomplete, 9/20/2016, 11/19/2013 Influenza Vaccine 11/19/2015, 10/31/2014 Influenza Vaccine Whole 9/20/2011 PPD 9/24/2012 Pneumococcal Conjugate (PCV-13) 8/27/2015 TD Vaccine 1/1/1999 Tdap 10/1/2015 ZZZ-RETIRED (DO NOT USE) Pneumococcal Vaccine (Unspecified Type) 12/20/2011 Zoster 12/20/2011 Not reviewed this visit You Were Diagnosed With   
  
 Codes Comments Medicare annual wellness visit, subsequent    -  Primary ICD-10-CM: Z00.00 ICD-9-CM: V70.0 Advanced directives, counseling/discussion     ICD-10-CM: Z71.89 ICD-9-CM: V65.49 Screening for alcoholism     ICD-10-CM: Z13.89 ICD-9-CM: V79.1 Screening for depression     ICD-10-CM: Z13.89 ICD-9-CM: V79.0 Screen for colon cancer     ICD-10-CM: Z12.11 ICD-9-CM: V76.51 Screening for diabetes mellitus     ICD-10-CM: Z13.1 ICD-9-CM: V77.1 Screening for ischemic heart disease     ICD-10-CM: Z13.6 ICD-9-CM: V81.0 Encounter for screening mammogram for malignant neoplasm of breast     ICD-10-CM: Z12.31 
ICD-9-CM: V76.12 Body mass index 34.0-34.9, adult     ICD-10-CM: Z68.34 
ICD-9-CM: V85.34 Encounter for immunization     ICD-10-CM: B87 ICD-9-CM: V03.89   
 Obesity (BMI 30-39. 9)     ICD-10-CM: E66.9 ICD-9-CM: 278.00 Essential hypertension     ICD-10-CM: I10 
ICD-9-CM: 401.9 GERD without esophagitis     ICD-10-CM: K21.9 ICD-9-CM: 530.81 IFG (impaired fasting glucose)     ICD-10-CM: R73.01 
ICD-9-CM: 790.21 Dyslipidemia     ICD-10-CM: E78.5 ICD-9-CM: 272.4 Hypokalemia     ICD-10-CM: E87.6 ICD-9-CM: 276.8 Multinodular goiter     ICD-10-CM: E04.2 ICD-9-CM: 531. 1 Vitals BP Pulse Temp Resp Height(growth percentile) Weight(growth percentile) 118/82 69 98.8 °F (37.1 °C) 14 5' 6\" (1.676 m) 204 lb (92.5 kg) SpO2 BMI OB Status Smoking Status 99% 32.93 kg/m2 Hysterectomy Never Smoker Vitals History BMI and BSA Data Body Mass Index Body Surface Area  
 32.93 kg/m 2 2.08 m 2 Preferred Pharmacy Pharmacy Name Phone [address] [phone] Your Updated Medication List  
  
   
This list is accurate as of: 10/17/17 10:36 AM.  Always use your most recent med list.  
  
  
  
  
 fluticasone 50 mcg/actuation nasal spray Commonly known as:  FLONASE  
USE 2 SPRAYS IN EACH NOSTRIL DAILY pravastatin 20 mg tablet Commonly known as:  PRAVACHOL Take 1 Tab by mouth nightly. triamterene-hydroCHLOROthiazide 75-50 mg per tablet Commonly known as:  Marycruz Bong TAKE 1 TABLET EVERY DAY We Performed the Following ADMIN INFLUENZA VIRUS VAC [ [facility]] ADVANCE CARE PLANNING FIRST 30 MINS [00805 CPT(R)] Tosin 68 [[id number] [facility]] INFLUENZA VIRUS VACCINE, HIGH DOSE SEASONAL, PRESERVATIVE FREE [49936 CPT(R)] MO ANNUAL ALCOHOL SCREEN 15 MIN [id number] [facility]] To-Do List   
 10/25/2017 8:30 AM  
  Appointment with VANESA FLANAGAN at [address] [place], [place] [phone]) PAYMENT  For Non-Medicare patients - $15.00 will be collected from you at the time of your exam.  You will be billed $35.00 from the reading [facility]. OUTSIDE FILMS  - Any outside films related to the study being scheduled should be brought with you on the day of the exam.  If this cannot be done there may be a delay in the reading of the study. MEDICATIONS  - Patient must bring a complete list of all medications currently taking to include prescriptions, over-the-counter meds, herbals, vitamins & any dietary supplements  GENERAL INSTRUCTIONS  - On the day of your exam do not use any bath powder, deodorant or lotions on the armpit area. -Tenderness of breasts may cause an increase of discomfort during procedure. If you are experiencing breast tenderness on the day of your appointment and would like to reschedule, please call [phone].   
  
 04/16/2018 Lab:  METABOLIC PANEL, BASIC   
  
 04/16/2018 Lab:  TSH 3RD GENERATION   
  
 04/17/2018 Lab:  MAGNESIUM   
  
 04/18/2018 Lab:  HEMOGLOBIN A1C W/O EAG   
  
 04/18/2018 Lab:  T4, FREE Patient Instructions Medicare Wellness Visit, Female The best way to live healthy is to have a healthy lifestyle by eating a well-balanced diet, exercising regularly, limiting alcohol and stopping smoking. Regular physical exams and screening tests are another way to keep healthy. Preventive exams provided by your health care provider can find health problems before they become diseases or illnesses. Preventive services including immunizations, screening tests, monitoring and exams can help you take care of your own health. All people over age 72 should have a pneumovax  and and a prevnar shot to prevent pneumonia. These are once in a lifetime unless you and your provider decide differently. All people over 65 should have a yearly flu shot and a tetanus vaccine every 10 years. A bone mass density to screen for osteoporosis or thinning of the bones should be done every 2 years after 65. Screening for diabetes mellitus with a blood sugar test should be done every year. Glaucoma is a disease of the eye due to increased ocular pressure that can lead to blindness and it should be done every year by an eye professional. 
 
Cardiovascular screening tests that check for elevated lipids (fatty part of blood) which can lead to heart disease and strokes should be done every 5 years. Colorectal screening that evaluates for blood or polyps in your colon should be done yearly as a stool test or every five years as a flexible sigmoidoscope or every 10 years as a colonoscopy up to age 76. Breast cancer screening with a mammogram is recommended biennially  for women age 54-69. Screening for cervical cancer with a pap smear and pelvic exam is recommended for women after age 72 years every 2 years up to age 79 or when the provider and patient decide to stop. If there is a history of cervical abnormalities or other increased risk for cancer then the test is recommended yearly. Hepatitis C screening is also recommended for anyone born between 80 through [address]. A shingles vaccine is also recommended once in a lifetime after age 61. Your Medicare Wellness Exam is recommended annually. Here is a list of your current Health Maintenance items with a due date: 
Health Maintenance Due Topic Date Due  
 Flu Vaccine  08/01/2017 [address] [place] [address] Annual Well Visit  09/21/2017 Introducing [facility]! [facility] introduces [employer] patient portal. Now you can access parts of your medical record, email your doctor's office, and request medication refills online. 1. In your internet browser, go to [email or web address] 2. Click on the First Time User? Click Here link in the Sign In box. You will see the New Member Sign Up page. 3. Enter your [employer] Access Code exactly as it appears below. You will not need to use this code after youve completed the sign-up process.  If you do not sign up before the expiration date, you must request a new code. · [employer]Most Access Code: [email or web address] Expires: 12/28/2017  9:12 AM 
 
4. Enter the last four digits of your Social Security Number (xxxx) and Date of Birth (mm/dd/yyyy) as indicated and click Submit. You will be taken to the next sign-up page. 5. Create a AquaMost ID. This will be your [employer]Most login ID and cannot be changed, so think of one that is secure and easy to remember. 6. Create a [employer]Most password. You can change your password at any time. 7. Enter your Password Reset Question and Answer. This can be used at a later time if you forget your password. 8. Enter your e-mail address. You will receive e-mail notification when new information is available in [address] 9. Click Sign Up. You can now view and download portions of your medical record. 10. Click the Download Summary menu link to download a portable copy of your medical information. If you have questions, please visit the Frequently Asked Questions section of the [employer] website. Remember, [employer]Most is NOT to be used for urgent needs. For medical emergencies, dial 911. Now available from your iPhone and Android! Please provide this summary of care documentation to your next provider. Your primary care clinician is listed as Angie Tracy. If you have any questions after today's visit, please call [phone].

## 2017-10-25 ENCOUNTER — HOSPITAL ENCOUNTER (OUTPATIENT)
Dept: MAMMOGRAPHY | Age: 72
Discharge: HOME | End: 2017-10-25
Attending: OBSTETRICS & GYNECOLOGY
Payer: MEDICARE

## 2017-10-25 ENCOUNTER — HOSPITAL ENCOUNTER (EMERGENCY)
Age: 72
Discharge: HOME | End: 2017-10-25
Attending: EMERGENCY MEDICINE
Payer: MEDICARE

## 2017-10-25 ENCOUNTER — HOSPITAL ENCOUNTER (OUTPATIENT)
Dept: GENERAL RADIOLOGY | Age: 72
Discharge: HOME | End: 2017-10-25
Attending: EMERGENCY MEDICINE
Payer: MEDICARE

## 2017-10-25 VITALS
DIASTOLIC BLOOD PRESSURE: 86 MMHG | HEART RATE: 79 BPM | TEMPERATURE: 98.3 F | SYSTOLIC BLOOD PRESSURE: 141 MMHG | BODY MASS INDEX: 33.99 KG/M2 | RESPIRATION RATE: 16 BRPM | HEIGHT: 65 IN | WEIGHT: 204 LBS | OXYGEN SATURATION: 100 %

## 2017-10-25 DIAGNOSIS — M77.31 CALCANEAL SPUR OF FOOT, RIGHT: Primary | ICD-10-CM

## 2017-10-25 DIAGNOSIS — Z12.31 VISIT FOR SCREENING MAMMOGRAM: ICD-10-CM

## 2017-10-25 DIAGNOSIS — M79.671 PAIN OF RIGHT HEEL: ICD-10-CM

## 2017-10-25 PROCEDURE — 99283 EMERGENCY DEPT VISIT LOW MDM: CPT

## 2017-10-25 PROCEDURE — 77063 BREAST TOMOSYNTHESIS BI: CPT

## 2017-10-25 PROCEDURE — 73650 X-RAY EXAM OF HEEL: CPT

## 2017-10-25 PROCEDURE — L1902 AFO ANKLE GAUNTLET PRE OTS: HCPCS

## 2017-10-25 PROCEDURE — 74011250637 HC RX REV CODE- 250/637: Performed by: EMERGENCY MEDICINE

## 2017-10-25 RX ORDER — ACETAMINOPHEN 500 MG
1000 TABLET ORAL ONCE
Status: COMPLETED | OUTPATIENT
Start: 2017-10-25 | End: 2017-10-25

## 2017-10-25 RX ADMIN — ACETAMINOPHEN 1000 MG: 500 TABLET ORAL at 10:10

## 2017-10-25 NOTE — ED PROVIDER NOTES
HPI Comments: 9:26 AM Rosalina Hernandez is a 70 y.o. female with a h/o HTN, DJD who presents to the ED c/o right heel pain that began 3 days ago. She states that she walked 2 miles 4 days ago for a aida walk and reports \"banging\" it 3 days ago while coming down the stairs. She reports some relief with Aleve, ice and rest.  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 Denies any other pain including her legs, feet, or back. Has been able to ambulate with some discomfort. PCP:  Caden Cordon MD      The history is provided by the patient. No  was used.         Past Medical History:   Diagnosis Date    Allergic rhinitis     Anemia     chronic    Cataract     Chronic constipation     DJD (degenerative joint disease) of knee     Dr. Curry Hernandez Dyslipidemia     10 year calculated risk score was 10.3% (7/13); 11.3% (10/14); 15.2% (3/16)    FHx: heart disease     Fibrocystic breast     neg bx x2 1970s    GERD (gastroesophageal reflux disease)     HTN (hypertension)     IFG (impaired fasting glucose) 2/14    Morbid obesity (HCC)     peak weight 230 lbs, bmi 38.8 from 10/11    Multinodular goiter 2007    negative biopsy Dr. Hood Rosario       Past Surgical History:   Procedure Laterality Date    DEXA BONE DENSITY STUDY AXIAL      DEXA t score 0.2 spine, -0.2 hip (9/09); -0.1 spine, -0.2 hip (3/14)    HX APPENDECTOMY      HX BREAST BIOPSY      1967 (RT)/ 1971 (LT)   Jayne Larkin negative 2002, Dr. Beto Cai negative 4/3/12    HX DILATION AND CURETTAGE      x2     HX HEMORRHOIDECTOMY      HX HYSTERECTOMY      due to  [address]    HX PELVIC LAPAROSCOPY           Family History:   Problem Relation Age of Onset    Hypertension Mother     Hypertension Father     Cancer Father      pancreatic    Hypertension Brother     Cancer Brother 46     lung    Hypertension Sister     Diabetes Brother     Heart Disease Sister Social History     Social History    Marital status:      Spouse name: N/A    Number of children: N/A    Years of education: N/A     Occupational History    retired principal      Social History Main Topics    Smoking status: Never Smoker    Smokeless tobacco: Never Used    Alcohol use No    Drug use: No    Sexual activity: Not on file     Other Topics Concern    Not on file     Social History Narrative         ALLERGIES: Codeine    Review of Systems   Constitutional: Negative for chills, fatigue and fever. HENT: Positive for congestion. Negative for ear pain, rhinorrhea and sore throat. Eyes: Negative for pain, redness and itching. Respiratory: Positive for cough. Negative for chest tightness and shortness of breath. Cardiovascular: Negative for chest pain, palpitations and leg swelling. Gastrointestinal: Negative for abdominal pain, diarrhea, nausea and vomiting. Genitourinary: Negative for decreased urine volume, dysuria, flank pain, hematuria and pelvic pain. Musculoskeletal: Positive for arthralgias. Negative for back pain, joint swelling and myalgias. Skin: Negative for color change, pallor and rash. Neurological: Negative for dizziness, weakness and headaches. Hematological: Negative for adenopathy. Does not bruise/bleed easily. Vitals:    10/25/17 0920   BP: 141/86   Pulse: 79   Resp: 16   Temp: 98.3 °F (36.8 °C)   SpO2: 100%   Weight: 92.5 kg (204 lb)   Height: 5' 5\" (1.651 m)            Physical Exam   Constitutional: No distress. HENT:   Head: Normocephalic and atraumatic. Mouth/Throat: Oropharynx is clear and moist.   Eyes: Conjunctivae and EOM are normal. Pupils are equal, round, and reactive to light. Neck: Normal range of motion. Neck supple. Cardiovascular: Normal rate, regular rhythm and normal heart sounds. No murmur heard. Pulmonary/Chest: Effort normal and breath sounds normal. She has no wheezes. She has no rales. Abdominal: Soft. Bowel sounds are normal. She exhibits no distension. There is no tenderness. Musculoskeletal: Normal range of motion. She exhibits no edema or deformity. tend right to posterior calcaneous     no skin tenting     Foot warm, DP and PT pulses 2+, sensation intact    No palpable defect in right achilles tendon with     negative Mendieta's Test   Lymphadenopathy:     She has no cervical adenopathy. Neurological: She is alert. She exhibits normal muscle tone. Coordination normal.   Skin: Skin is warm and dry. No rash noted. She is not diaphoretic. No erythema. Psychiatric: She has a normal mood and affect. Her behavior is normal.        [facility]  ED Course       Procedures      Vitals:  Patient Vitals for the past 12 hrs:   Temp Pulse Resp BP SpO2   10/25/17 0920 98.3 °F (36.8 °C) 79 16 141/86 100 %       Medications ordered:   Medications   acetaminophen (TYLENOL) tablet 1,000 mg (1,000 mg Oral Given 10/25/17 1010)        X-Ray, CT or other radiology findings or impressions:  XR CALCANEUS RT   Small to moderate sized inferior calcaneal spur. No other demonstrable  abnormality in right calcaneus bone.     Mild prominence of the lower end of the right calcaneal tendon on is nonspecific  finding on plain radiographs. Tendinopathy is possible. As interpreted by RAD. Progress notes, Consult notes or additional Procedure notes:     No signs of fracture or achilles injury. Discussed conservative mgmt with rest, ice, NSAID, supportive shoe. Referred to podiatry for further assessment      Disposition:  Diagnosis:   1. Calcaneal spur of foot, right    2. Pain of right heel        Disposition: Discharge    Follow-up Information     None           Patient's Medications   Start Taking    No medications on file   Continue Taking    FLUTICASONE (FLONASE) 50 MCG/ACTUATION NASAL SPRAY    USE 2 SPRAYS IN EACH NOSTRIL DAILY    PRAVASTATIN (PRAVACHOL) 20 MG TABLET    Take 1 Tab by mouth nightly. TRIAMTERENE-HYDROCHLOROTHIAZIDE (MAXZIDE) 75-50 MG PER TABLET    TAKE 1 TABLET EVERY DAY   These Medications have changed    No medications on file   Stop Taking    No medications on file         Scribe [address] [place] [address] acting as a scribe for and in the presence of Brando Rogel MD      October 25, 2017 at 9:32 AM       Provider Attestation:      I personally performed the services described in the documentation, reviewed the documentation, as recorded by the scribe in my presence, and it accurately and completely records my words and actions.  October 25, 2017 at 9:32 AM - Brando Rogel MD

## 2017-10-25 NOTE — DISCHARGE INSTRUCTIONS
IF YOU HAVE SEVERE PAIN, NUMBNESS OR WEAKNESS, TROUBLE MOVING YOUR FOOT OR ANKLE OR ANY OTHER WORRYING SIGNS THEN RETURN TO THE ER RIGHT AWAY. Heel Pain: Care Instructions  Your Care Instructions  You can have heel pain from an injury or from everyday overuse, such as running or walking a lot. Plantar fasciitis is the most common cause of heel pain. In this condition, the bottom of your foot from the front of the heel to the base of the toes is sore and hard to walk on. Your heel can get better with rest, anti-inflammatory pain medicines, and stretching exercises. Follow-up care is a key part of your treatment and safety. Be sure to make and go to all appointments, and call your doctor if you are having problems. Its also a good idea to know your test results and keep a list of the medicines you take. How can you care for yourself at home? · Rest your feet often. Reduce your activity to a level that lets you avoid pain. If possible, do not run or walk on hard surfaces. · Take anti-inflammatory medicines to reduce heel pain. These include ibuprofen (Advil, Motrin) and naproxen (Aleve). Read and follow all instructions on the label. · Put ice or a cold pack on your heel for 10 to 20 minutes at a time. Try to do this every 1 to 2 hours for the next 3 days (when you are awake). Put a thin cloth between the ice and your skin. · If ice isn't helping after 2 or 3 days, try heat, such as a heating pad set on low. · If your doctor says it is okay, try these calf stretches. Tight calf muscles can cause heel pain or make it worse. ¨ Stand about 1 foot from a wall. Place the palms of both hands against the wall at chest level and lean forward against the wall. Put the leg you want to stretch about a step behind your other leg. Keep your back heel on the floor and bend your front knee until you feel a stretch in the back leg. Hold this position for 15 to 30 seconds. Repeat the exercise 2 to 4 times a session.  Do 3 to 4 sessions a day. ¨ Sit down on the floor or a mat with your feet stretched in front of you. Roll up a towel lengthwise, and loop it over the ball of your foot. Holding the towel at both ends, gently pull the towel toward you to stretch your foot. Hold this position for 15 to 30 seconds. Repeat the exercise 2 to 4 times a session. Do 3 to 4 sessions a day. · Wear a night splint if your doctor suggests it. A night splint holds your foot with the toes pointed up. This position gives the bottom of your foot a constant, gentle stretch. · Wear shoes with good arch support. Athletic shoes or shoes with a well-cushioned sole are good choices. · Try a heel lift, heel cup or shoe insert (orthotic) to help cushion your heel. You can buy these at many shoe stores. Use them in both shoes, even if only one foot hurts. · Maintain a healthy weight. This puts less strain on your feet. When should you call for help? Call your doctor now or seek immediate medical care if:  · You have heel pain with fever, redness, or warmth in your heel. · You have numbness or tingling in your heel. Watch closely for changes in your health, and be sure to contact your doctor if:  · You cannot put weight on the sore foot. · Your heel pain lasts more than 2 weeks. Where can you learn more? Go to http://blessing-seth.info/. Enter S299 in the search box to learn more about \"Heel Pain: Care Instructions. \"  Current as of: March 20, 2017  Content Version: 11.3  © 3251-1550 [employer]. Care instructions adapted under license by [employer] (which disclaims liability or warranty for this information). If you have questions about a medical condition or this instruction, always ask your healthcare professional. [address] any warranty or liability for your use of this information. Learning About RICE (Rest, Ice, Compression, and Elevation)  What is RICE?   RICE is a way to care for an injury. RICE helps relieve pain and swelling. It may also help with healing and flexibility. RICE stands for:  · Rest and protect the injured or sore area. · Ice or a cold pack used as soon as possible. · Compression, or wrapping the injured or sore area with an elastic bandage. · Elevation (propping up) the injured or sore area. How do you do RICE? You can use RICE for home treatment when you have general aches and pains or after an injury or surgery. Rest  · Do not put weight on the injury for at least 24 to 48 hours. · Use crutches for a badly sprained knee or ankle. · Support a sprained wrist, elbow, or shoulder with a sling. Ice  · Put ice or a cold pack on the injury right away to reduce pain and swelling. Frozen vegetables will also work as an ice pack. Put a thin cloth between the ice or cold pack and your skin. The cloth protects the injured area from getting too cold. · Use ice for 10 to 15 minutes at a time for the first 48 to 72 hours. Compression  · Use compression for sprains, strains, and surgeries of the arms and legs. · Wrap the injured area with an elastic bandage or compression sleeve to reduce swelling. · Don't wrap it too tightly. If the area below it feels numb, tingles, or feels cool, loosen the wrap. Elevation  · Use elevation for areas of the body that can be propped up, such as arms and legs. · Prop up the injured area on pillows whenever you use ice. Keep it propped up anytime you sit or lie down. · Try to keep the injured area at or above the level of your heart. This will help reduce swelling and bruising. Where can you learn more? Go to http://blessing-seth.info/. Enter T443 in the search box to learn more about \"Learning About RICE (Rest, Ice, Compression, and Elevation). \"  Current as of: March 21, 2017  Content Version: 11.3  © 0588-9118 [employer].  Care instructions adapted under license by [employer] (which disclaims liability or warranty for this information). If you have questions about a medical condition or this instruction, always ask your healthcare professional. [address] any warranty or liability for your use of this information.

## 2017-10-26 ENCOUNTER — PATIENT OUTREACH (OUTPATIENT)
Dept: INTERNAL MEDICINE CLINIC | Age: 72
End: 2017-10-26

## 2017-10-26 NOTE — PROGRESS NOTES
Contacted patient for transition of care post ED follow up. No answer. Left message introducing self, role and reason for. Requested return call. Will attempt to contact at a later time.

## 2017-10-27 ENCOUNTER — PATIENT OUTREACH (OUTPATIENT)
Dept: INTERNAL MEDICINE CLINIC | Age: 72
End: 2017-10-27

## 2017-10-27 NOTE — PROGRESS NOTES
Contacted patient for transition of care follow up s/p ED visit. No answer. Left message introducing self, role and reason for call. Requested return call. Contact information provided. This is 2nd call. NN will send letter to patient.

## 2017-10-27 NOTE — LETTER
10/27/2017 1:05 PM 
 
Ms. Carol Mohr [address] [address] I am a Nurse Navigator working with your physician's office. Part of my job is to follow up with patients who have been in the emergency department or hospital to see how they are feeling, answer any questions they may have about their visit and also make sure they have a follow-up appointment to see their primary care doctor. I can also provide resources to patients that have other needs. Please call me if you need assistance with affording medications, need transportation to physician appointments or any other issue or concern. I can be reached Monday thru Friday at the telephone number listed above. Thank you for allowing us to participate in your care.   
 
 
 
Sincerely, 
 
 
Fanny James RN

## 2017-11-03 ENCOUNTER — TELEPHONE (OUTPATIENT)
Dept: INTERNAL MEDICINE CLINIC | Age: 72
End: 2017-11-03

## 2017-11-03 NOTE — TELEPHONE ENCOUNTER
Patient returinng call to Nurse OMER/ [address], said she has been missing your call. Wanted to let you know that she is fine and she just had a accident where she hit her heel on a brick steep, but she is ok.

## 2017-11-30 ENCOUNTER — PATIENT OUTREACH (OUTPATIENT)
Dept: PULMONOLOGY | Age: 72
End: 2017-11-30

## 2017-11-30 NOTE — PROGRESS NOTES
Episode closed:  No hospitalization or ED admission post 30 days from discharge of 10/25/17. No follow up MD appointment. Goals Addressed : None    Unable to make phone contact with patient. Letter Sent. Patient phoned Dr. Oliver Prado office on 11/3/17 to tell NN she was fine.

## 2018-03-14 DIAGNOSIS — E78.5 DYSLIPIDEMIA: ICD-10-CM

## 2018-03-14 RX ORDER — PRAVASTATIN SODIUM 20 MG/1
TABLET ORAL
Qty: 90 TAB | Refills: 3 | Status: SHIPPED | OUTPATIENT
Start: 2018-03-14 | End: 2020-01-20 | Stop reason: SDUPTHER

## 2018-04-11 ENCOUNTER — OFFICE VISIT (OUTPATIENT)
Dept: INTERNAL MEDICINE CLINIC | Age: 73
End: 2018-04-11

## 2018-04-11 VITALS
BODY MASS INDEX: 34.32 KG/M2 | RESPIRATION RATE: 14 BRPM | HEIGHT: 65 IN | HEART RATE: 69 BPM | OXYGEN SATURATION: 100 % | WEIGHT: 206 LBS | DIASTOLIC BLOOD PRESSURE: 80 MMHG | SYSTOLIC BLOOD PRESSURE: 120 MMHG | TEMPERATURE: 98.3 F

## 2018-04-11 DIAGNOSIS — J30.2 SEASONAL ALLERGIC RHINITIS, UNSPECIFIED TRIGGER: Primary | ICD-10-CM

## 2018-04-11 DIAGNOSIS — J01.10 ACUTE NON-RECURRENT FRONTAL SINUSITIS: ICD-10-CM

## 2018-04-11 RX ORDER — AZITHROMYCIN 250 MG/1
TABLET, FILM COATED ORAL
Qty: 6 TAB | Refills: 0 | Status: SHIPPED | OUTPATIENT
Start: 2018-04-11 | End: 2018-10-29 | Stop reason: ALTCHOICE

## 2018-04-11 NOTE — PROGRESS NOTES
67 y.o. BLACK OR  female who presents for evaluation. Her allergies have been flaring. Symptoms started to worsen over the weekend with congestion, postnasal drip, dry cough, minimal sore throat. She's been visiting her mom who's been in the hospital also. No fever, ear pain, difficulty swallowing, wheezing, dyspnea, sputum is minimally discolored. Been using flonase but no antihistamines, she has neti pot but not using    Past Medical History:   Diagnosis Date    Allergic rhinitis     Anemia     chronic    Cataract     Chronic constipation     DJD (degenerative joint disease) of knee     Dr. Santiago Gonzales Dyslipidemia     10 year calculated risk score was 10.3% (7/13); 11.3% (10/14); 15.2% (3/16)    FHx: heart disease     Fibrocystic breast     neg bx x2 1970s    GERD (gastroesophageal reflux disease)     HTN (hypertension)     IFG (impaired fasting glucose) 2/14    Morbid obesity (HCC)     peak weight 230 lbs, bmi 38.8 from 10/11    Multinodular goiter 2007    negative biopsy Dr. Dee Garcia     Current Outpatient Prescriptions   Medication Sig    pravastatin (PRAVACHOL) 20 mg tablet TAKE 1 TABLET EVERY NIGHT    triamterene-hydroCHLOROthiazide (MAXZIDE) 75-50 mg per tablet TAKE 1 TABLET EVERY DAY    fluticasone (FLONASE) 50 mcg/actuation nasal spray USE 2 SPRAYS IN EACH NOSTRIL DAILY     No current facility-administered medications for this visit. Allergies   Allergen Reactions    Codeine Itching     \"Loopy\"     Visit Vitals    /80 (BP 1 Location: Right arm, BP Patient Position: Sitting)    Pulse 69    Temp 98.3 °F (36.8 °C) (Oral)    Resp 14    Ht 5' 5\" (1.651 m)    Wt 206 lb (93.4 kg)    SpO2 100%    BMI 34.28 kg/m2   tm wnl, sinuses nt with mildly boggy mucosa, op benign, no nodes. Lungs cta, heart showed rrr, abd soft and nt    Assessment and plan:  1. Allergies? Possible early sinusitis? Will add allegra and have her start doing the neti-pot.   Called in zpak in case she gets more infection sx over the next few days        Above conditions discussed at length and patient vocalized understanding.   All questions answered to patient satisfaction Daily Assessment

## 2018-04-11 NOTE — PROGRESS NOTES
1. Have you been to the ER, urgent care clinic or hospitalized since your last visit? NO.     2. Have you seen or consulted any other health care providers outside of the [address] [place] [address] since your last visit (Include any pap smears or colon screening)? NO      Chief Complaint   Patient presents with    Sinus Infection     headache, runny nose, coughing and feeling run down. onset monday.  muccinex and flonase has helped some

## 2018-04-11 NOTE — MR AVS SNAPSHOT
Marybeth Guerra 
 
 
 [address], [address] [place] [address] 
[phone] Patient: Yomaira Moreland MRN: [id number] Visit Information Date & Time Provider Department Dept. Phone Encounter #  
 2018  9:20 AM Lary Shelby MD [facility] [phone] [id number] Your Appointments 2018  8:35 AM  
LAB with [facility] NURSE VISIT [facility] ([facility]) Appt Note: lab  
 [address], [address] [place] [address] [address]  
  
   
 [address], [address]  
  
    
 2018 11:20 AM  
Office Visit with Lary Shelby MD  
[facility]) Appt Note: 6 months; pt r/s from 18  
 [address] [id number] [place] [address] [address]  
  
   
 [address], [address] Upcoming Health Maintenance Date Due  
 GLAUCOMA SCREENING Q2Y 9/15/2018 MEDICARE YEARLY EXAM 10/18/2018 BREAST CANCER SCRN MAMMOGRAM 10/25/2019 COLONOSCOPY 2022 DTaP/Tdap/Td series (2 - Td) 10/1/2025 Allergies as of 2018  Review Complete On: 2018 By: Bertha Jara Severity Noted Reaction Type Reactions Codeine  2012    Itching \"Loopy\" Current Immunizations  Reviewed on 2016 Name Date H1N1 FLU VACCINE 2009 Influenza High Dose Vaccine PF 10/17/2017 10:03 AM, 2016, 2013 Influenza Vaccine 2015, 10/31/2014 Influenza Vaccine Whole 2011 PPD 2012 Pneumococcal Conjugate (PCV-13) 2015 TD Vaccine 1999 Tdap 10/1/2015 ZZZ-RETIRED (DO NOT USE) Pneumococcal Vaccine (Unspecified Type) 2011 Zoster 2011 Not reviewed this visit Vitals BP Pulse Temp Resp Height(growth percentile) Weight(growth percentile) 120/80 (BP 1 Location: Right arm, BP Patient Position: Sitting) 69 98.3 °F (36.8 °C) (Oral) 14 5' 5\" (1.651 m) 206 lb (93.4 kg) SpO2 BMI OB Status Smoking Status 100% 34.28 kg/m2 Hysterectomy Never Smoker Vitals History BMI and BSA Data Body Mass Index Body Surface Area  
 34.28 kg/m 2 2.07 m 2 Preferred Pharmacy Pharmacy Name Phone [facility] [address] [place] [address] [address] [facility] [address] [phone] Your Updated Medication List  
  
   
This list is accurate as of 4/11/18  9:59 AM.  Always use your most recent med list.  
  
  
  
  
 fluticasone 50 mcg/actuation nasal spray Commonly known as:  FLONASE  
USE 2 SPRAYS IN EACH NOSTRIL DAILY pravastatin 20 mg tablet Commonly known as:  PRAVACHOL  
TAKE 1 TABLET EVERY NIGHT  
  
 triamterene-hydroCHLOROthiazide 75-50 mg per tablet Commonly known as:  Bobo Gee TAKE 1 TABLET EVERY DAY Introducing [facility] SERVICES! [facility] introduces AdventureDrop patient portal. Now you can access parts of your medical record, email your doctor's office, and request medication refills online. 1. In your internet browser, go to [email or web address] 2. Click on the First Time User? Click Here link in the Sign In box. You will see the New Member Sign Up page. 3. Enter your AdventureDrop Access Code exactly as it appears below. You will not need to use this code after youve completed the sign-up process. If you do not sign up before the expiration date, you must request a new code. · AdventureDrop Access Code: [email or web address] Expires: 7/10/2018  9:27 AM 
 
4. Enter the last four digits of your Social Security Number (xxxx) and Date of Birth (mm/dd/yyyy) as indicated and click Submit. You will be taken to the next sign-up page. 5. Create a AdventureDrop ID. This will be your AdventureDrop login ID and cannot be changed, so think of one that is secure and easy to remember. 6. Create a Wantr password. You can change your password at any time. 7. Enter your Password Reset Question and Answer. This can be used at a later time if you forget your password. 8. Enter your e-mail address. You will receive e-mail notification when new information is available in [address] 9. Click Sign Up. You can now view and download portions of your medical record. 10. Click the Download Summary menu link to download a portable copy of your medical information. If you have questions, please visit the Frequently Asked Questions section of the Wantr website. Remember, Wantr is NOT to be used for urgent needs. For medical emergencies, dial 911. Now available from your iPhone and Android! Please provide this summary of care documentation to your next provider. Your primary care clinician is listed as Mable Crews. If you have any questions after today's visit, please call [phone].

## 2018-04-16 ENCOUNTER — APPOINTMENT (OUTPATIENT)
Dept: INTERNAL MEDICINE CLINIC | Age: 73
End: 2018-04-16

## 2018-04-16 ENCOUNTER — HOSPITAL ENCOUNTER (OUTPATIENT)
Dept: LAB | Age: 73
Discharge: HOME | End: 2018-04-16
Payer: MEDICARE

## 2018-04-16 DIAGNOSIS — E87.6 HYPOKALEMIA: ICD-10-CM

## 2018-04-16 DIAGNOSIS — E04.2 MULTINODULAR GOITER: ICD-10-CM

## 2018-04-16 DIAGNOSIS — R73.01 IFG (IMPAIRED FASTING GLUCOSE): ICD-10-CM

## 2018-04-16 LAB
ANION GAP SERPL CALC-SCNC: 11 MMOL/L (ref 3–18)
BUN SERPL-MCNC: 18 MG/DL (ref 7–18)
BUN/CREAT SERPL: 22 (ref 12–20)
CALCIUM SERPL-MCNC: 8.5 MG/DL (ref 8.5–10.1)
CHLORIDE SERPL-SCNC: 107 MMOL/L (ref 100–108)
CO2 SERPL-SCNC: 27 MMOL/L (ref 21–32)
CREAT SERPL-MCNC: 0.81 MG/DL (ref 0.6–1.3)
GLUCOSE SERPL-MCNC: 110 MG/DL (ref 74–99)
MAGNESIUM SERPL-MCNC: 2.2 MG/DL (ref 1.6–2.6)
POTASSIUM SERPL-SCNC: 3.2 MMOL/L (ref 3.5–5.5)
SODIUM SERPL-SCNC: 145 MMOL/L (ref 136–145)
T4 FREE SERPL-MCNC: 1.3 NG/DL (ref 0.7–1.5)
TSH SERPL DL<=0.05 MIU/L-ACNC: 0.93 UIU/ML (ref 0.36–3.74)

## 2018-04-16 PROCEDURE — 84439 ASSAY OF FREE THYROXINE: CPT | Performed by: INTERNAL MEDICINE

## 2018-04-16 PROCEDURE — 84443 ASSAY THYROID STIM HORMONE: CPT | Performed by: INTERNAL MEDICINE

## 2018-04-16 PROCEDURE — 36415 COLL VENOUS BLD VENIPUNCTURE: CPT | Performed by: INTERNAL MEDICINE

## 2018-04-16 PROCEDURE — 83735 ASSAY OF MAGNESIUM: CPT | Performed by: INTERNAL MEDICINE

## 2018-04-16 PROCEDURE — 83036 HEMOGLOBIN GLYCOSYLATED A1C: CPT | Performed by: INTERNAL MEDICINE

## 2018-04-16 PROCEDURE — 80048 BASIC METABOLIC PNL TOTAL CA: CPT | Performed by: INTERNAL MEDICINE

## 2018-04-17 LAB — HBA1C MFR BLD: 6.1 % (ref 4.2–5.6)

## 2018-04-25 NOTE — PROGRESS NOTES
67 y.o. black female who presents for evaluation. She seems to be doing well. However, she has not been going to the [facility] as her mom fell ill and subsequently ended up in the nursing home so she's now doing a lot of visiting and caretaking and not able to go to the [facility] for silver sneakers. No cardiovascular complaints when she is active    No GI or  complaints     No sx referable to the thyroid    Denies polyuria, polydipsia, nocturia, vision change. Not checking sugars at this time.   Weight has gone up some w the dec activity levels, diet is off some also    Vitals 4/30/2018 4/11/2018 10/25/2017 10/17/2017 4/10/2017   Weight 209 lb 206 lb 204 lb 204 lb 216 lb     LAST MEDICARE WELLNESS EXAM: 3/5/14, 8/27/15, 9/20/16, 10/17/17  ACP 9/20/16, 10/17/17    IF 4/18    Past Medical History:   Diagnosis Date    Allergic rhinitis     Anemia     chronic    Cataract     Chronic constipation     DJD (degenerative joint disease) of knee     Dr. Matt Kenyon Dyslipidemia     10 year calculated risk score was 10.3% (7/13); 11.3% (10/14); 15.2% (3/16)    FHx: heart disease     Fibrocystic breast     neg bx x2 1970s    GERD (gastroesophageal reflux disease)     HTN (hypertension)     IFG (impaired fasting glucose) 2/14    Morbid obesity (HCC)     peak weight 230 lbs, bmi 38.8 from 10/11    Multinodular goiter 2007    negative biopsy Dr. Michael Mcgill     Past Surgical History:   Procedure Laterality Date    DEXA BONE DENSITY STUDY AXIAL      DEXA t score 0.2 spine, -0.2 hip (9/09); -0.1 spine, -0.2 hip (3/14)    HX APPENDECTOMY      HX BREAST BIOPSY      1967 (RT)/ 1971 (LT)   Ignacia Rohan Vega negative 2002, Dr. Andres Abbott negative 4/3/12    HX Doristine Polanco      x2     HX HEMORRHOIDECTOMY      HX HYSTERECTOMY      due to  [address]    HX PELVIC LAPAROSCOPY       Allergies   Allergen Reactions    Codeine Itching     \"Loopy\"     Current Outpatient Prescriptions   Medication Sig    pravastatin (PRAVACHOL) 20 mg tablet TAKE 1 TABLET EVERY NIGHT    triamterene-hydroCHLOROthiazide (MAXZIDE) 75-50 mg per tablet TAKE 1 TABLET EVERY DAY    fluticasone (FLONASE) 50 mcg/actuation nasal spray USE 2 SPRAYS IN EACH NOSTRIL DAILY    azithromycin (ZITHROMAX) 250 mg tablet Take 2 tablets today, then take 1 tablet daily     No current facility-administered medications for this visit. REVIEW OF SYSTEMS: gyn Dr Odonnell Kin 3/14, mammo 10/17, DEXA 3/14, colo 2012 Dr Kaila Phelps, sees Dr Tracie Cadet  Ophtho  no vision change or eye pain  Oral  no mouth pain, tongue or tooth problems  Ears  no hearing loss, ear pain, fullness, no swallowing problems  Cardiac  no CP, PND, orthopnea, edema, palpitations or syncope  Chest  no breast masses  Resp  no wheezing, chronic coughing, dyspnea  GI  no heartburn, nausea, vomiting, change in bowel habits, bleeding, hemorrhoids  Urinary  no dysuria, hematuria, flank pain, urgency, frequency  Genitals  no genital lesions, discharge, masses, ulceration, warts  Ortho  no swelling, dec ROM, myalgias  Derm  no nail abnormalities, rashes, lesions of note, hair loss  Psych  denies any anxiety or depression symptoms, no hallucinations or violent ideation  Constitutional  no wt loss, night sweats, unexplained fevers  Neuro  no focal weakness, numbness, paresthesias, incoordination, ataxia, involuntary movements  Endo - no polyuria, polydipsia, nocturia, hot flashes    Visit Vitals    /74 (BP 1 Location: Left arm, BP Patient Position: Sitting)    Pulse 62    Temp 98.5 °F (36.9 °C) (Oral)    Resp 14    Ht 5' 5\" (1.651 m)    Wt 209 lb (94.8 kg)    SpO2 100%    BMI 34.78 kg/m2     A&O x3  Affect is appropriate. Mood stable  No apparent distress  Anicteric, no JVD, adenopathy. Goiter noted   No carotid bruits or radiated murmur  Lungs clear to auscultation, no wheezes or rales  Heart showed regular rate and rhythm.  No murmur, rubs, gallops  Abdomen soft nontender, no hepatosplenomegaly or masses. Extremities without edema.   Pulses 1-2+ symmetrically    LABS  From 12/08                                      ua neg  From 7/09 showed   gluc 98,   cr 0.90,     alt 10,          chol 223, tg 75, hdl 71, ldl-c 137  From 12/11 showed               ldl-p 1525, chol 247, tg 61, hdl 78, ldl-c 157, wbc 7.6, hb 11.7, plt 265  From 6/12 showed                   ldl-p 1177, chol 238, tg 48, hdl 78, ldl-c 152,           tsh 1.81  From 1/13 showed   gluc 99,   cr 0.95, gfr 72,  alt<5,  ldl-p 1238, chol 206, tg 56, hdl 75, ldl-c 120, wbc 8.2, hb 11.4, plt 259  From 7/13 showed               chol 221, tg 72, hdl 72, ldl-c 135  From 2/14 showed   gluc 101, cr 0.78, gfr 90,                   tsh 2.04  From 10/14 showed gluc 94,   cr 1.02, gfr>60, alt 5,   hba1c 6.2, chol 231, tg 73, hdl 83, ldl-c 133, wbc 7.9, hb 11.9, plt 281  From 3/16 showed   gluc 107, cr 0.75, gfr>60, alt 11, hba1c 5.2, chol 229, tg 74, hdl 90, ldl-c 124, wbc 7.6, hb 12.0, plt 256, tsh 1.22, ua neg  From 9/16 showed   gluc 105, cr 0.89, gfr>60, alt 14,         chol 198, tg 54, hdl 93, ldl-c 94  From 4/17 showed               chol 204, tg 69, hdl 83, ldl-c 107, wbc 8.5, hb 11.5, plt 247, tsh 0.73  From 10/17 showed gluc 107, cr 0.90, gfr>60, alt 14, hba1c 5.9, chol 216, tg 68, hdl 99, ldl-c 103, wbc 6.8, hb 11.7, plt 262    Results for orders placed or performed during the hospital encounter of 28/87/43   METABOLIC PANEL, BASIC   Result Value Ref Range    Sodium 145 136 - 145 mmol/L    Potassium 3.2 (L) 3.5 - 5.5 mmol/L    Chloride 107 100 - 108 mmol/L    CO2 27 21 - 32 mmol/L    Anion gap 11 3.0 - 18 mmol/L    Glucose 110 (H) 74 - 99 mg/dL    BUN 18 7.0 - 18 MG/DL    Creatinine 0.81 0.6 - 1.3 MG/DL    BUN/Creatinine ratio 22 (H) 12 - 20      GFR est AA >60 >60 ml/min/1.73m2    GFR est non-AA >60 >60 ml/min/1.73m2    Calcium 8.5 8.5 - 10.1 MG/DL   HEMOGLOBIN A1C W/O EAG   Result Value Ref Range    Hemoglobin A1c 6.1 (H) 4.2 - 5.6 %   TSH 3RD GENERATION   Result Value Ref Range    TSH 0.93 0.36 - 3.74 uIU/mL   T4, FREE   Result Value Ref Range    T4, Free 1.3 0.7 - 1.5 NG/DL   MAGNESIUM   Result Value Ref Range    Magnesium 2.2 1.6 - 2.6 mg/dL     Patient Active Problem List   Diagnosis Code    Multinodular goiter neg biopsy 2007 Dr. Berger Median E04.2    Dyslipidemia E78.5    IFG (impaired fasting glucose) R73.01    Essential hypertension I10    Degenerative arthritis of knee, bilateral M17.0    GERD without esophagitis K21.9    Advance directive in chart Z78.9    Obesity (BMI 30-39. 9) E66.9     ASSESSMENT AND PLAN:   1. Hypertension. Continue current regimen. 2.  Obesity. Diet and lifestyle changes reiterated. Declined flavio suppressants previously. Intermittent fasting discussed at length. Explained the concepts in detail. Went over possible physiologic changes that could occur and how that would possibly impact her situation in a positive way. Discussed 16:8 program in particular. We also went over the differences between hunger and leonie hypoglycemia. Look up low insulin index foods. She will do some more research and consider implementing in the near future, standard handout given  3. Allergic rhinitis. Prn antihistamines and flonase  4. Fibrocystic breasts. F/U GYN and mammos  5. DJD. Prn nsaids  6. General.  Ca/D advocated. 7.  Thyroid. Doing well  8. Dyslipidemia. Continue current regimen. 9.  IFG. Wt loss, exercise and diet  10. Hypokalemia. She will start otc liquid replacement vs prescription  11. The right knee flared up a couple days back but better after taking otc nsaid      RTC 11/18    Above conditions discussed at length and patient vocalized understanding.   All questions answered to patient satisfaction

## 2018-04-30 ENCOUNTER — OFFICE VISIT (OUTPATIENT)
Dept: INTERNAL MEDICINE CLINIC | Age: 73
End: 2018-04-30

## 2018-04-30 VITALS
RESPIRATION RATE: 14 BRPM | TEMPERATURE: 98.5 F | BODY MASS INDEX: 34.82 KG/M2 | OXYGEN SATURATION: 100 % | HEIGHT: 65 IN | DIASTOLIC BLOOD PRESSURE: 74 MMHG | SYSTOLIC BLOOD PRESSURE: 120 MMHG | WEIGHT: 209 LBS | HEART RATE: 62 BPM

## 2018-04-30 DIAGNOSIS — K21.9 GERD WITHOUT ESOPHAGITIS: ICD-10-CM

## 2018-04-30 DIAGNOSIS — E66.9 OBESITY (BMI 30-39.9): ICD-10-CM

## 2018-04-30 DIAGNOSIS — E04.2 MULTINODULAR GOITER: ICD-10-CM

## 2018-04-30 DIAGNOSIS — I10 ESSENTIAL HYPERTENSION: Primary | ICD-10-CM

## 2018-04-30 DIAGNOSIS — R73.01 IFG (IMPAIRED FASTING GLUCOSE): ICD-10-CM

## 2018-04-30 DIAGNOSIS — M17.0 OSTEOARTHRITIS OF BOTH KNEES, UNSPECIFIED OSTEOARTHRITIS TYPE: ICD-10-CM

## 2018-04-30 DIAGNOSIS — E78.5 DYSLIPIDEMIA: ICD-10-CM

## 2018-04-30 RX ORDER — TRIAMTERENE AND HYDROCHLOROTHIAZIDE 75; 50 MG/1; MG/1
1 TABLET ORAL DAILY
Qty: 90 TAB | Refills: 3 | Status: SHIPPED | OUTPATIENT
Start: 2018-04-30 | End: 2019-02-18 | Stop reason: SDUPTHER

## 2018-04-30 NOTE — MR AVS SNAPSHOT
[address] [facility] 
 
 
 [address], [address] [place] [address] [facility] 
[phone] Patient: [place] Gregg MRN: [id number] :1945 Visit Information Date & Time Provider Department Dept. Phone Encounter #  
 2018 11:20 AM Heike Sommer MD [facility] [address] [place] [address] [phone] Your Appointments 10/22/2018  9:15 AM  
LAB with C NURSE VISIT [facility] [address] [place] [address] ([facility] Appt Note: lab  
 [address], [address] On license of [facility] [address] [place]  
  
   
 [address], [address]  
  
    
 10/29/2018  9:00 AM  
Office Visit with Heike Sommer MD  
[facility] [address] [place] [address]) Appt Note: 6 mo f/u  
 [address] [facility], [address] [facility] [address] [place]  
  
   
 [address], [address] Upcoming Health Maintenance Date Due Influenza Age 5 to Adult 2018 GLAUCOMA SCREENING Q2Y 9/15/2018 MEDICARE YEARLY EXAM 10/18/2018 BREAST CANCER SCRN MAMMOGRAM 10/25/2019 COLONOSCOPY 2022 DTaP/Tdap/Td series (2 - Td) 10/1/2025 Allergies as of 2018  Review Complete On: 2018 By: Zain Six Severity Noted Reaction Type Reactions Codeine  2012    Itching \"Loopy\" Current Immunizations  Reviewed on 2016 Name Date H1N1 FLU VACCINE 2009 Influenza High Dose Vaccine PF 10/17/2017 10:03 AM, 2016, 2013 Influenza Vaccine 2015, 10/31/2014 Influenza Vaccine Whole 2011 PPD 2012 Pneumococcal Conjugate (PCV-13) 2015 TD Vaccine 1999 Tdap 10/1/2015 ZZZ-RETIRED (DO NOT USE) Pneumococcal Vaccine (Unspecified Type) 2011 Zoster 2011 Not reviewed this visit Vitals BP Pulse Temp Resp Height(growth percentile) Weight(growth percentile) 120/74 (BP 1 Location: Left arm, BP Patient Position: Sitting) 62 98.5 °F (36.9 °C) (Oral) 14 5' 5\" (1.651 m) 209 lb (94.8 kg) SpO2 BMI OB Status Smoking Status 100% 34.78 kg/m2 Hysterectomy Never Smoker Vitals History BMI and BSA Data Body Mass Index Body Surface Area 34.78 kg/m 2 2.09 m 2 Preferred Pharmacy Pharmacy Name Phone Aileen Davila [address] [place] [phone] [facility] [address] [phone] Your Updated Medication List  
  
   
This list is accurate as of 4/30/18 12:23 PM.  Always use your most recent med list.  
  
  
  
  
 azithromycin 250 mg tablet Commonly known as:  Yeimi Flatness Take 2 tablets today, then take 1 tablet daily  
  
 fluticasone 50 mcg/actuation nasal spray Commonly known as:  FLONASE  
USE 2 SPRAYS IN EACH NOSTRIL DAILY pravastatin 20 mg tablet Commonly known as:  PRAVACHOL  
TAKE 1 TABLET EVERY NIGHT  
  
 triamterene-hydroCHLOROthiazide 75-50 mg per tablet Commonly known as:  Lui Bianka TAKE 1 TABLET EVERY DAY Introducing [address]! Chuyita Patel introduces [employer] patient portal. Now you can access parts of your medical record, email your doctor's office, and request medication refills online. 1. In your internet browser, go to [email or web address] 2. Click on the First Time User? Click Here link in the Sign In box. You will see the New Member Sign Up page. 3. Enter your [employer] Access Code exactly as it appears below. You will not need to use this code after youve completed the sign-up process. If you do not sign up before the expiration date, you must request a new code. · [employer] Access Code: [email or web address] Expires: 7/10/2018  9:27 AM 
 
4. Enter the last four digits of your Social Security Number (xxxx) and Date of Birth (mm/dd/yyyy) as indicated and click Submit. You will be taken to the next sign-up page. 5. Create a TRUE linkswear ID. This will be your TRUE links[employer] login ID and cannot be changed, so think of one that is secure and easy to remember. 6. Create a TRUE linkswear password. You can change your password at any time. 7. Enter your Password Reset Question and Answer. This can be used at a later time if you forget your password. 8. Enter your e-mail address. You will receive e-mail notification when new information is available in [address] 9. Click Sign Up. You can now view and download portions of your medical record. 10. Click the Download Summary menu link to download a portable copy of your medical information. If you have questions, please visit the Frequently Asked Questions section of the TRUE links[employer] website. Remember, TRUE linkswear is NOT to be used for urgent needs. For medical emergencies, dial 911. Now available from your iPhone and Android! Please provide this summary of care documentation to your next provider. Your primary care clinician is listed as Homa Almaguer. If you have any questions after today's visit, please call [phone].

## 2018-04-30 NOTE — PROGRESS NOTES
1. Have you been to the ER, urgent care clinic or hospitalized since your last visit? NO.     2. Have you seen or consulted any other health care providers outside of the [address] [place] [address] since your last visit (Include any pap smears or colon screening)?  NO    Chief Complaint   Patient presents with    Cholesterol Problem     6 month follow up with labs

## 2018-06-06 ENCOUNTER — TELEPHONE (OUTPATIENT)
Dept: INTERNAL MEDICINE CLINIC | Age: 73
End: 2018-06-06

## 2018-06-06 DIAGNOSIS — F43.21 GRIEF REACTION: Primary | ICD-10-CM

## 2018-06-06 NOTE — TELEPHONE ENCOUNTER
Pt called and stated tht her mother passed on 05/27/18 and she has been having a lot of anxiety and insomnia, she stated tht RD had given her a rx a few years ago when another family member had passed she isn't sure what, adv her he isn't in, pls see if anything can be done

## 2018-06-08 RX ORDER — DIAZEPAM 5 MG/1
5 TABLET ORAL
Qty: 30 TAB | Refills: 0 | OUTPATIENT
Start: 2018-06-08 | End: 2019-01-16 | Stop reason: SDUPTHER

## 2018-06-08 NOTE — TELEPHONE ENCOUNTER
Valium called into [facility]      Notified patient that valium was phoned into pharmacy

## 2018-10-22 ENCOUNTER — HOSPITAL ENCOUNTER (OUTPATIENT)
Dept: LAB | Age: 73
Discharge: HOME | End: 2018-10-22
Payer: MEDICARE

## 2018-10-22 ENCOUNTER — APPOINTMENT (OUTPATIENT)
Dept: INTERNAL MEDICINE CLINIC | Age: 73
End: 2018-10-22

## 2018-10-22 DIAGNOSIS — E78.5 DYSLIPIDEMIA: ICD-10-CM

## 2018-10-22 DIAGNOSIS — I10 ESSENTIAL HYPERTENSION: ICD-10-CM

## 2018-10-22 DIAGNOSIS — R73.01 IFG (IMPAIRED FASTING GLUCOSE): ICD-10-CM

## 2018-10-22 LAB
ANION GAP SERPL CALC-SCNC: 7 MMOL/L (ref 3–18)
BUN SERPL-MCNC: 17 MG/DL (ref 7–18)
BUN/CREAT SERPL: 20 (ref 12–20)
CALCIUM SERPL-MCNC: 9 MG/DL (ref 8.5–10.1)
CHLORIDE SERPL-SCNC: 106 MMOL/L (ref 100–108)
CHOLEST SERPL-MCNC: 201 MG/DL
CO2 SERPL-SCNC: 31 MMOL/L (ref 21–32)
CREAT SERPL-MCNC: 0.87 MG/DL (ref 0.6–1.3)
GLUCOSE SERPL-MCNC: 111 MG/DL (ref 74–99)
HBA1C MFR BLD: 6 % (ref 4.2–5.6)
HDLC SERPL-MCNC: 94 MG/DL (ref 40–60)
HDLC SERPL: 2.1 {RATIO} (ref 0–5)
LDLC SERPL CALC-MCNC: 92.6 MG/DL (ref 0–100)
LIPID PROFILE,FLP: ABNORMAL
POTASSIUM SERPL-SCNC: 3.5 MMOL/L (ref 3.5–5.5)
SODIUM SERPL-SCNC: 144 MMOL/L (ref 136–145)
TRIGL SERPL-MCNC: 72 MG/DL (ref ?–150)
VLDLC SERPL CALC-MCNC: 14.4 MG/DL

## 2018-10-22 PROCEDURE — 80061 LIPID PANEL: CPT | Performed by: INTERNAL MEDICINE

## 2018-10-22 PROCEDURE — 83036 HEMOGLOBIN GLYCOSYLATED A1C: CPT | Performed by: INTERNAL MEDICINE

## 2018-10-22 PROCEDURE — 36415 COLL VENOUS BLD VENIPUNCTURE: CPT | Performed by: INTERNAL MEDICINE

## 2018-10-22 PROCEDURE — 80048 BASIC METABOLIC PNL TOTAL CA: CPT | Performed by: INTERNAL MEDICINE

## 2018-10-26 NOTE — PROGRESS NOTES
This is a Subsequent [facility] Wellness Visit I have reviewed the patient's medical history in detail and updated the computerized patient record. History Past Medical History:  
Diagnosis Date  Allergic rhinitis  Anemia   
 chronic  Cataract  Chronic constipation  DJD (degenerative joint disease) of knee Dr. Donis Colón  Dyslipidemia 10 year calculated risk score was 10.3% (7/13); 11.3% (10/14); 15.2% (3/16)  FHx: heart disease  Fibrocystic breast   
 neg bx x2 1970s  GERD (gastroesophageal reflux disease)  HTN (hypertension)  IFG (impaired fasting glucose) 2/14  Morbid obesity ([address] peak weight 230 lbs, bmi 38.8 from 10/11  Multinodular goiter 2007  
 negative biopsy Dr. Supa Santiago Past Surgical History:  
Procedure Laterality Date  DEXA BONE DENSITY STUDY AXIAL    
 DEXA t score 0.2 spine, -0.2 hip (9/09); -0.1 spine, -0.2 hip (3/14)  HX APPENDECTOMY  HX BREAST BIOPSY    
 1967 (RT)/ 1971 (LT)  
 HX COLONOSCOPY Dr. Jaimee Yap negative 2002, Dr. Flaca Vogel negative 4/3/12  HX DILATION AND CURETTAGE    
 x2   
 HX HEMORRHOIDECTOMY  HX HYSTERECTOMY    
 due to  [facility]  HX PELVIC LAPAROSCOPY Current Outpatient Medications Medication Sig Dispense Refill  fluticasone (FLONASE) 50 mcg/actuation nasal spray USE 2 SPRAYS IN EACH NOSTRIL DAILY 48 g 3  
 traZODone (DESYREL) 50 mg tablet 1 tab as needed nightly for sleep 30 Tab 2  
 diazePAM (VALIUM) 5 mg tablet Take 1 Tab by mouth every twelve (12) hours as needed for Anxiety. Max Daily Amount: 10 mg. 30 Tab 0  
 triamterene-hydroCHLOROthiazide (MAXZIDE) 75-50 mg per tablet Take 1 Tab by mouth daily. 90 Tab 3  pravastatin (PRAVACHOL) 20 mg tablet TAKE 1 TABLET EVERY NIGHT 90 Tab 3 Allergies Allergen Reactions  Codeine Itching \"Loopy\" Family History Problem Relation Age of Onset  Hypertension Mother  Hypertension Father  Cancer Father   
     pancreatic  Hypertension Brother  Cancer Brother 51  
     lung  Hypertension Sister  Diabetes Brother  Heart Disease Sister Social History Tobacco Use  Smoking status: Never Smoker  Smokeless tobacco: Never Used Substance Use Topics  Alcohol use: No  
 
Depression Risk Factor Screening: PHQ over the last two weeks 10/29/2018 Little interest or pleasure in doing things Not at all Feeling down, depressed, irritable, or hopeless Several days Total Score PHQ 2 1 SCREENINGS Colonoscopy last done 2012 Dr Salvatore Cavanaugh Mammogram last done 10/18 DEXA last done 3/14 Gyn last done >5 yrs Immunization History Administered Date(s) Administered  H1N1 Influenza Virus Vaccine 12/01/2009  Influenza High Dose Vaccine PF 11/19/2013, 09/20/2016, 10/17/2017  Influenza Vaccine 10/31/2014, 11/19/2015  Influenza Vaccine Whole 09/20/2011  PPD 09/24/2012  Pneumococcal Conjugate (PCV-13) 08/27/2015  TD Vaccine 01/01/1999  
 Tdap 10/01/2015  ZZZ-RETIRED (DO NOT USE) Pneumococcal Vaccine (Unspecified Type) 12/20/2011  Zoster 12/20/2011 Alcohol Risk Factor Screening: On any occasion during the past 3 months, have you had more than 3 drinks containing alcohol? No 
 
Do you average more than 7 drinks per week? No 
 
 
Functional Ability and Level of Safety:  
 
Hearing Loss  
normal-to-mild Vision - no acute complaints and is followed by Dr Andrea Ruiz Activities of Daily Living Self-care. Requires assistance with: no ADLs Fall Risk Fall Risk Assessment, last 12 mths 10/29/2018 Able to walk? Yes Fall in past 12 months? No  
 
Abuse Screen Patient is not abused Review of Systems A comprehensive review of systems was negative except for that written in the HPI. Physical Examination Evaluation of Cognitive Function: 
Mood/affect:  neutral 
 Appearance: age appropriate, overweight, well dressed and within normal Limits Family member/caregiver input: na 
 
Patient Care Team: 
Cleveland Hermosillo MD as PCP - General (Internal Medicine) Jovany Pedersen, RN as Ambulatory Care Navigator (Internal Medicine) Advice/Referrals/Counseling Education and counseling provided: 
Are appropriate based on today's review and evaluation End-of-Life planning (with patient's consent) Pneumococcal Vaccine Influenza Vaccine Screening Mammography Colorectal cancer screening tests Cardiovascular screening blood test 
Bone mass measurement (DEXA) Diabetes screening test 
 
Assessment/Plan ICD-10-CM ICD-9-CM 1. Medicare annual wellness visit, subsequent Z00.00 V70.0 2. Insomnia, unspecified type G47.00 780.52 traZODone (DESYREL) 50 mg tablet 3. Essential hypertension I10 401.9 4. Obesity (BMI 30-39. 9) E66.9 278.00   
5. GERD without esophagitis K21.9 530.81   
6. IFG (impaired fasting glucose) R73.01 790.21 HEMOGLOBIN A1C W/O EAG  
   CBC W/O DIFF 7. Dyslipidemia E78.5 272.4 8. Multinodular goiter neg biopsy 2007 Dr. Cally Villarreal E04.2 241.1 9. Advanced directives, counseling/discussion Z71.89 V65.49 ADVANCE CARE PLANNING FIRST 30 MINS 10. Screening for alcoholism Z13.39 V79.1 MI ANNUAL ALCOHOL SCREEN 15 MIN 11. Screening for depression Z13.31 [facility]79.0 [facility] 12. Screen for colon cancer Z12.11 V76.51   
13. Screening for diabetes mellitus Z13.1 V77.1 14. Screening for ischemic heart disease Z13.6 V81.0 15. Body mass index 34.0-34.9, adult Z68.34 V85.34   
 
current treatment plan is effective, no change in therapy 
lab results and schedule of future lab studies reviewed with patient 
reviewed diet, exercise and weight control 
cardiovascular risk and specific lipid/LDL goals reviewed. End of life discussion undertaken. amd on file Flu high dose declined 
shingrix advocated Colonoscopy to be scheduled 2022 Mammo to be scheduled 2019 DEXA deferred til 2019 at earliest

## 2018-10-26 NOTE — PROGRESS NOTES
67 y.o. black female who presents for evaluation. She seems to be doing well physically. 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 Having difficulty sleeping some nights and asking for sleep aid No GI or  complaints No sx referable to the thyroid Denies polyuria, polydipsia, nocturia, vision change. Not checking sugars at this time. We introduced intermittent fasting after the last visit and she was losing weight while on it but then her mom  and she came off and regained the weight Vitals 10/29/2018 2018 2018 10/25/2017 10/17/2017 Weight 206 lb 209 lb 206 lb 204 lb 204 lb  
 
LAST MEDICARE WELLNESS EXAM: 3/5/14, 8/27/15, 16, 10/17/17, 10/29/18 IF  Past Medical History:  
Diagnosis Date  Allergic rhinitis  Anemia   
 chronic  Cataract  Chronic constipation  DJD (degenerative joint disease) of knee Dr. Varma Numbers  Dyslipidemia 10 year calculated risk score was 10.3% (); 11.3% (10/14); 15.2% (3/16)  FHx: heart disease  Fibrocystic breast   
 neg bx x2 1970s  GERD (gastroesophageal reflux disease)  HTN (hypertension)  IFG (impaired fasting glucose)   Morbid obesity ([facility] [address] peak weight 230 lbs, bmi 38.8 from 10/11  Multinodular goiter   
 negative biopsy Dr. Stacy Bashir Past Surgical History:  
Procedure Laterality Date  DEXA BONE DENSITY STUDY AXIAL    
 DEXA t score 0.2 spine, -0.2 hip (); -0.1 spine, -0.2 hip (3/14)  HX APPENDECTOMY  HX BREAST BIOPSY    
  (RT)/  (LT)  
 HX COLONOSCOPY Dr. Naz Ling negative , Dr. Salvatore Cavanaugh negative 4/3/12  HX DILATION AND CURETTAGE    
 x2   
 HX HEMORRHOIDECTOMY  HX HYSTERECTOMY    
 due to  [facility]  HX PELVIC LAPAROSCOPY Allergies Allergen Reactions  Codeine Itching \"Loopy\" Current Outpatient Medications Medication Sig  
  fluticasone (FLONASE) 50 mcg/actuation nasal spray USE 2 SPRAYS IN EACH NOSTRIL DAILY  traZODone (DESYREL) 50 mg tablet 1 tab as needed nightly for sleep  diazePAM (VALIUM) 5 mg tablet Take 1 Tab by mouth every twelve (12) hours as needed for Anxiety. Max Daily Amount: 10 mg.  
 triamterene-hydroCHLOROthiazide (MAXZIDE) 75-50 mg per tablet Take 1 Tab by mouth daily.  pravastatin (PRAVACHOL) 20 mg tablet TAKE 1 TABLET EVERY NIGHT No current facility-administered medications for this visit. REVIEW OF SYSTEMS: gyn Dr Sahra Castano 3/14, mammo 10/18, DEXA 3/14, colo 2012 Dr Ovalle School, sees Dr Jassi Dickerson Ophtho  no vision change or eye pain Oral  no mouth pain, tongue or tooth problems Ears  no hearing loss, ear pain, fullness, no swallowing problems Cardiac  no CP, PND, orthopnea, edema, palpitations or syncope Chest  no breast masses Resp  no wheezing, chronic coughing, dyspnea GI  no heartburn, nausea, vomiting, change in bowel habits, bleeding, hemorrhoids Urinary  no dysuria, hematuria, flank pain, urgency, frequency Genitals  no genital lesions, discharge, masses, ulceration, warts Ortho  no swelling, dec ROM, myalgias Derm  no nail abnormalities, rashes, lesions of note, hair loss Psych  denies any anxiety or depression symptoms, no hallucinations or violent ideation Constitutional  no wt loss, night sweats, unexplained fevers Neuro  no focal weakness, numbness, paresthesias, incoordination, ataxia, involuntary movements Endo - no polyuria, polydipsia, nocturia, hot flashes Visit Vitals /82 Pulse 79 Temp 98.2 °F (36.8 °C) (Oral) Resp 14 Ht 5' 5\" (1.651 m) Wt 206 lb (93.4 kg) SpO2 99% BMI 34.28 kg/m² A&O x3 Affect is appropriate. Mood stable No apparent distress Anicteric, no JVD, adenopathy. Goiter noted No carotid bruits or radiated murmur Lungs clear to auscultation, no wheezes or rales Heart showed regular rate and rhythm. No murmur, rubs, gallops Abdomen soft nontender, no hepatosplenomegaly or masses. Extremities without edema. Pulses 1-2+ symmetrically LABS From 12/08                                      ua neg From 7/09 showed   gluc 98,   cr 0.90,     alt 10,          chol 223, tg 75, hdl 71, ldl-c 137 From 12/11 showed               ldl-p 1525, chol 247, tg 61, hdl 78, ldl-c 157, wbc 7.6, hb 11.7, plt 265 From 6/12 showed                   ldl-p 1177, chol 238, tg 48, hdl 78, ldl-c 152,           tsh 1.81 From 1/13 showed   gluc 99,   cr 0.95, gfr 72,  alt<5,  ldl-p 1238, chol 206, tg 56, hdl 75, ldl-c 120, wbc 8.2, hb 11.4, plt 259 From 7/13 showed               chol 221, tg 72, hdl 72, ldl-c 135 From 2/14 showed   gluc 101, cr 0.78, gfr 90,                   tsh 2.04 From 10/14 showed gluc 94,   cr 1.02, gfr>60, alt 5,   hba1c 6.2, chol 231, tg 73, hdl 83, ldl-c 133, wbc 7.9, hb 11.9, plt 281 From 3/16 showed   gluc 107, cr 0.75, gfr>60, alt 11, hba1c 5.2, chol 229, tg 74, hdl 90, ldl-c 124, wbc 7.6, hb 12.0, plt 256, tsh 1.22, ua neg From 9/16 showed   gluc 105, cr 0.89, gfr>60, alt 14,         chol 198, tg 54, hdl 93, ldl-c 94 From 4/17 showed               chol 204, tg 69, hdl 83, ldl-c 107, wbc 8.5, hb 11.5, plt 247, tsh 0.73 From 10/17 showed gluc 107, cr 0.90, gfr>60, alt 14, hba1c 5.9, chol 216, tg 68, hdl 99, ldl-c 103, wbc 6.8, hb 11.7, plt 262 From 4/18 showed   gluc 110, cr 0.81, gfr>60,    hba1c 6.1,                tsh 0.93, ft4 1.30 Results for orders placed or performed during the hospital encounter of 10/22/18 METABOLIC PANEL, BASIC Result Value Ref Range Sodium 144 136 - 145 mmol/L Potassium 3.5 3.5 - 5.5 mmol/L Chloride 106 100 - 108 mmol/L  
 CO2 31 21 - 32 mmol/L Anion gap 7 3.0 - 18 mmol/L Glucose 111 (H) 74 - 99 mg/dL BUN 17 7.0 - 18 MG/DL  Creatinine 0.87 0.6 - 1.3 MG/DL  
 BUN/Creatinine ratio 20 12 - 20    
 GFR est AA >60 >60 ml/min/1.73m2 GFR est non-AA >60 >60 ml/min/1.73m2 Calcium 9.0 8.5 - 10.1 MG/DL  
HEMOGLOBIN A1C W/O EAG Result Value Ref Range Hemoglobin A1c 6.0 (H) 4.2 - 5.6 % LIPID PANEL Result Value Ref Range LIPID PROFILE Cholesterol, total 201 (H) <200 MG/DL Triglyceride 72 <150 MG/DL  
 HDL Cholesterol 94 (H) 40 - 60 MG/DL  
 LDL, calculated 92.6 0 - 100 MG/DL VLDL, calculated 14.4 MG/DL  
 CHOL/HDL Ratio 2.1 0 - 5.0 Patient Active Problem List  
Diagnosis Code  Multinodular goiter neg biopsy 2007 Dr. Maryuri Ramos K46.3  Dyslipidemia E78.5  
 IFG (impaired fasting glucose) R73.01  
 Essential hypertension I10  Degenerative arthritis of knee, bilateral M17.0  GERD without esophagitis K21.9  Advance directive in chart Z78.9  Obesity (BMI 30-39. 9) E66.9 ASSESSMENT AND PLAN:  
1. Hypertension. Continue current regimen. 2.  Obesity. Restart IF when she can 3. Allergic rhinitis. Prn antihistamines and flonase 4. Fibrocystic breasts. F/U GYN and mammos 5. DJD. Prn nsaids 6. General.  Ca/D advocated. 7.  Thyroid. Doing well 8. Dyslipidemia. Continue current regimen. 9.  IFG. Wt loss, exercise and diet 10. Insomnia. Trial trazodone after discussing possible sfx RTC 1/19 per her request 
 
Above conditions discussed at length and patient vocalized understanding. All questions answered to patient satisfaction

## 2018-10-27 ENCOUNTER — HOSPITAL ENCOUNTER (OUTPATIENT)
Dept: MAMMOGRAPHY | Age: 73
Discharge: HOME | End: 2018-10-27
Attending: OBSTETRICS & GYNECOLOGY
Payer: MEDICARE

## 2018-10-27 DIAGNOSIS — Z12.31 VISIT FOR SCREENING MAMMOGRAM: ICD-10-CM

## 2018-10-27 PROCEDURE — 77063 BREAST TOMOSYNTHESIS BI: CPT

## 2018-10-29 ENCOUNTER — OFFICE VISIT (OUTPATIENT)
Dept: INTERNAL MEDICINE CLINIC | Age: 73
End: 2018-10-29

## 2018-10-29 VITALS
HEART RATE: 79 BPM | DIASTOLIC BLOOD PRESSURE: 82 MMHG | SYSTOLIC BLOOD PRESSURE: 138 MMHG | WEIGHT: 206 LBS | HEIGHT: 65 IN | OXYGEN SATURATION: 99 % | TEMPERATURE: 98.2 F | RESPIRATION RATE: 14 BRPM | BODY MASS INDEX: 34.32 KG/M2

## 2018-10-29 DIAGNOSIS — Z13.31 SCREENING FOR DEPRESSION: ICD-10-CM

## 2018-10-29 DIAGNOSIS — Z13.39 SCREENING FOR ALCOHOLISM: ICD-10-CM

## 2018-10-29 DIAGNOSIS — Z00.00 MEDICARE ANNUAL WELLNESS VISIT, SUBSEQUENT: Primary | ICD-10-CM

## 2018-10-29 DIAGNOSIS — G47.00 INSOMNIA, UNSPECIFIED TYPE: ICD-10-CM

## 2018-10-29 DIAGNOSIS — Z71.89 ADVANCED DIRECTIVES, COUNSELING/DISCUSSION: ICD-10-CM

## 2018-10-29 DIAGNOSIS — E04.2 MULTINODULAR GOITER: ICD-10-CM

## 2018-10-29 DIAGNOSIS — E66.9 OBESITY (BMI 30-39.9): ICD-10-CM

## 2018-10-29 DIAGNOSIS — R73.01 IFG (IMPAIRED FASTING GLUCOSE): ICD-10-CM

## 2018-10-29 DIAGNOSIS — Z13.6 SCREENING FOR ISCHEMIC HEART DISEASE: ICD-10-CM

## 2018-10-29 DIAGNOSIS — E78.5 DYSLIPIDEMIA: ICD-10-CM

## 2018-10-29 DIAGNOSIS — Z13.1 SCREENING FOR DIABETES MELLITUS: ICD-10-CM

## 2018-10-29 DIAGNOSIS — I10 ESSENTIAL HYPERTENSION: ICD-10-CM

## 2018-10-29 DIAGNOSIS — Z12.11 SCREEN FOR COLON CANCER: ICD-10-CM

## 2018-10-29 DIAGNOSIS — K21.9 GERD WITHOUT ESOPHAGITIS: ICD-10-CM

## 2018-10-29 RX ORDER — FLUTICASONE PROPIONATE 50 MCG
SPRAY, SUSPENSION (ML) NASAL
Qty: 48 G | Refills: 3 | Status: SHIPPED | OUTPATIENT
Start: 2018-10-29 | End: 2019-09-02 | Stop reason: SDUPTHER

## 2018-10-29 RX ORDER — TRAZODONE HYDROCHLORIDE 50 MG/1
TABLET ORAL
Qty: 30 TAB | Refills: 2 | Status: SHIPPED | OUTPATIENT
Start: 2018-10-29 | End: 2019-07-29 | Stop reason: SDUPTHER

## 2018-10-29 NOTE — ACP (ADVANCE CARE PLANNING)
Advance Care Planning    Advance Care Planning (ACP) Provider Note - Comprehensive     Date of ACP Conversation: 10/29/18  Persons included in Conversation:  patient  Length of ACP Conversation in minutes:  16 minutes    Authorized Decision Maker (if patient is incapable of making informed decisions): This person is:   Healthcare Agent/Medical Power of  under Advance Directive          General ACP for ALL Patients with Decision Making Capacity:   Importance of advance care planning, including choosing a healthcare agent to communicate patient's healthcare decisions if patient lost the ability to make decisions, such as after a sudden illness or accident  Understanding of the healthcare agent role was assessed and information provided  Exploration of values, goals, and preferences if recovery is not expected, even with continued medical treatment in the event of: Imminent death  Severe, permanent brain injury    Review of Existing Advance Directive:  Does this advance directive still reflect your preferences? Yes (Provide new form/Refer for assistance in updating)    For Serious or Chronic Illness:  Understanding of medical condition    Understanding of CPR, goals and expected outcomes, benefits and burdens discussed.     Interventions Provided:  Recommended communicating the plan and making copies for the healthcare agent, personal physician, and others as appropriate (e.g., health system)  Recommended review of completed ACP document annually or upon change in health status

## 2018-10-29 NOTE — PROGRESS NOTES
1. Have you been to the ER, urgent care clinic or hospitalized since your last visit? NO.  
 
2. Have you seen or consulted any other health care providers outside of the [address] [place] [address] since your last visit (Include any pap smears or colon screening)? NO Chief Complaint Patient presents with  Hypertension

## 2019-01-16 ENCOUNTER — PATIENT OUTREACH (OUTPATIENT)
Dept: PULMONOLOGY | Age: 74
End: 2019-01-16

## 2019-01-16 DIAGNOSIS — F43.21 GRIEF REACTION: ICD-10-CM

## 2019-01-16 RX ORDER — DIAZEPAM 5 MG/1
TABLET ORAL
Qty: 30 TAB | Refills: 0 | Status: SHIPPED | OUTPATIENT
Start: 2019-01-16 | End: 2019-12-05 | Stop reason: SDUPTHER

## 2019-01-17 ENCOUNTER — TELEPHONE (OUTPATIENT)
Dept: INTERNAL MEDICINE CLINIC | Age: 74
End: 2019-01-17

## 2019-02-18 DIAGNOSIS — I10 ESSENTIAL HYPERTENSION: ICD-10-CM

## 2019-02-19 RX ORDER — TRIAMTERENE AND HYDROCHLOROTHIAZIDE 75; 50 MG/1; MG/1
TABLET ORAL
Qty: 90 TAB | Refills: 3 | Status: SHIPPED | OUTPATIENT
Start: 2019-02-19 | End: 2019-12-10 | Stop reason: SDUPTHER

## 2019-07-22 ENCOUNTER — HOSPITAL ENCOUNTER (OUTPATIENT)
Dept: LAB | Age: 74
Discharge: HOME | End: 2019-07-22
Payer: MEDICARE

## 2019-07-22 ENCOUNTER — LAB ONLY (OUTPATIENT)
Dept: INTERNAL MEDICINE CLINIC | Age: 74
End: 2019-07-22

## 2019-07-22 DIAGNOSIS — E78.5 DYSLIPIDEMIA: ICD-10-CM

## 2019-07-22 DIAGNOSIS — R73.01 IFG (IMPAIRED FASTING GLUCOSE): ICD-10-CM

## 2019-07-22 DIAGNOSIS — I10 ESSENTIAL HYPERTENSION: Primary | ICD-10-CM

## 2019-07-22 DIAGNOSIS — I10 ESSENTIAL HYPERTENSION: ICD-10-CM

## 2019-07-22 LAB
ALBUMIN SERPL-MCNC: 3.4 G/DL (ref 3.4–5)
ALBUMIN/GLOB SERPL: 1.1 {RATIO} (ref 0.8–1.7)
ALP SERPL-CCNC: 96 U/L (ref 45–117)
ALT SERPL-CCNC: 11 U/L (ref 13–56)
ANION GAP SERPL CALC-SCNC: 6 MMOL/L (ref 3–18)
AST SERPL-CCNC: 10 U/L (ref 10–38)
BILIRUB SERPL-MCNC: 0.3 MG/DL (ref 0.2–1)
BUN SERPL-MCNC: 16 MG/DL (ref 7–18)
BUN/CREAT SERPL: 17 (ref 12–20)
CALCIUM SERPL-MCNC: 9.1 MG/DL (ref 8.5–10.1)
CHLORIDE SERPL-SCNC: 106 MMOL/L (ref 100–111)
CHOLEST SERPL-MCNC: 203 MG/DL
CO2 SERPL-SCNC: 29 MMOL/L (ref 21–32)
CREAT SERPL-MCNC: 0.94 MG/DL (ref 0.6–1.3)
ERYTHROCYTE [DISTWIDTH] IN BLOOD BY AUTOMATED COUNT: 13.4 % (ref 11.6–14.5)
EST. AVERAGE GLUCOSE BLD GHB EST-MCNC: 140 MG/DL
GLOBULIN SER CALC-MCNC: 3.2 G/DL (ref 2–4)
GLUCOSE SERPL-MCNC: 110 MG/DL (ref 74–99)
HBA1C MFR BLD: 6.5 % (ref 4.2–5.6)
HCT VFR BLD AUTO: 36.5 % (ref 35–45)
HDLC SERPL-MCNC: 85 MG/DL (ref 40–60)
HDLC SERPL: 2.4 {RATIO} (ref 0–5)
HGB BLD-MCNC: 11.6 G/DL (ref 12–16)
LDLC SERPL CALC-MCNC: 102.6 MG/DL (ref 0–100)
LIPID PROFILE,FLP: ABNORMAL
MCH RBC QN AUTO: 28.9 PG (ref 24–34)
MCHC RBC AUTO-ENTMCNC: 31.8 G/DL (ref 31–37)
MCV RBC AUTO: 91 FL (ref 74–97)
PLATELET # BLD AUTO: 249 K/UL (ref 135–420)
PMV BLD AUTO: 10.9 FL (ref 9.2–11.8)
POTASSIUM SERPL-SCNC: 3.2 MMOL/L (ref 3.5–5.5)
PROT SERPL-MCNC: 6.6 G/DL (ref 6.4–8.2)
RBC # BLD AUTO: 4.01 M/UL (ref 4.2–5.3)
SODIUM SERPL-SCNC: 141 MMOL/L (ref 136–145)
T4 FREE SERPL-MCNC: 1.3 NG/DL (ref 0.7–1.5)
TRIGL SERPL-MCNC: 77 MG/DL (ref ?–150)
TSH SERPL DL<=0.05 MIU/L-ACNC: 1.48 UIU/ML (ref 0.36–3.74)
VLDLC SERPL CALC-MCNC: 15.4 MG/DL
WBC # BLD AUTO: 6.9 K/UL (ref 4.6–13.2)

## 2019-07-22 PROCEDURE — 84439 ASSAY OF FREE THYROXINE: CPT

## 2019-07-22 PROCEDURE — 80053 COMPREHEN METABOLIC PANEL: CPT

## 2019-07-22 PROCEDURE — 80061 LIPID PANEL: CPT

## 2019-07-22 PROCEDURE — 85027 COMPLETE CBC AUTOMATED: CPT

## 2019-07-22 PROCEDURE — 36415 COLL VENOUS BLD VENIPUNCTURE: CPT

## 2019-07-22 PROCEDURE — 83036 HEMOGLOBIN GLYCOSYLATED A1C: CPT

## 2019-07-28 NOTE — PROGRESS NOTES
68 y.o. black female who presents for evaluation. She seems to be doing well. No cardiovascular complaints. She started back the [facility] just this past month mainly due to her busy schedule. There have been 6 deaths in her [place] in [place] of family and friends since we saw her last    No GI or  complaints     No sx referable to the thyroid    Denies polyuria, polydipsia, nocturia, vision change. Not checking sugars at this time.   Weight stable as below, not doing IF    Vitals 7/29/2019 10/29/2018 4/30/2018 4/11/2018 10/25/2017   Weight 203 lb 206 lb 209 lb 206 lb 204 lb     LAST MEDICARE WELLNESS EXAM: 3/5/14, 8/27/15, 9/20/16, 10/17/17, 10/29/18    Past Medical History:   Diagnosis Date    Allergic rhinitis     Anemia     chronic    Cataract     Chronic constipation     DJD (degenerative joint disease) of knee     Dr. Sisi Cheng Dyslipidemia     10 year calculated risk score was 10.3% (7/13); 11.3% (10/14); 15.2% (3/16)    FHx: heart disease     Fibrocystic breast     neg bx x2 1970s    GERD (gastroesophageal reflux disease)     HTN (hypertension)     IFG (impaired fasting glucose) 2/14    Morbid obesity (HCC)     peak weight 230 lbs, bmi 38.8 from 10/11; start weight 209 lbs but not doing    Multinodular goiter 2007    negative biopsy Dr. Larisa Suggs     Past Surgical History:   Procedure Laterality Date    DEXA BONE DENSITY STUDY AXIAL      DEXA t score 0.2 spine, -0.2 hip (9/09); -0.1 spine, -0.2 hip (3/14)    HX APPENDECTOMY      HX BREAST BIOPSY      1967 (RT)/ 1971 (LT)   Rad Palmer negative 2002, Dr. Monica Traore negative 4/3/12    HX DILATION AND CURETTAGE      x2     HX HEMORRHOIDECTOMY      HX HYSTERECTOMY      due to  leimyomata    HX PELVIC LAPAROSCOPY       Allergies   Allergen Reactions    Codeine Itching     \"Loopy\"     Current Outpatient Medications   Medication Sig    traZODone (DESYREL) 50 mg tablet 1 tab as needed nightly for sleep    triamterene-hydroCHLOROthiazide (MAXZIDE) 75-50 mg per tablet TAKE 1 TABLET EVERY DAY    diazePAM (VALIUM) 5 mg tablet TAKE 1 TABLET BY MOUTH EVERY 12 HOURS AS NEEDED FOR ANXIETY    fluticasone (FLONASE) 50 mcg/actuation nasal spray USE 2 SPRAYS IN EACH NOSTRIL DAILY    pravastatin (PRAVACHOL) 20 mg tablet TAKE 1 TABLET EVERY NIGHT     No current facility-administered medications for this visit. REVIEW OF SYSTEMS: gyn Dr Cindy Smith 3/14, mammo 10/18, DEXA 3/14, colo 2012 Dr Chirag Adorno, sees Dr Lorne Hawkins  Pleas Counts no vision change or eye pain  Oral  no mouth pain, tongue or tooth problems  Ears  no hearing loss, ear pain, fullness, no swallowing problems  Cardiac  no CP, PND, orthopnea, edema, palpitations or syncope  Chest  no breast masses  Resp  no wheezing, chronic coughing, dyspnea  GI  no heartburn, nausea, vomiting, change in bowel habits, bleeding, hemorrhoids  Urinary  no dysuria, hematuria, flank pain, urgency, frequency    Visit Vitals  /73 (BP 1 Location: Right arm, BP Patient Position: Sitting)   Pulse 66   Temp 98 °F (36.7 °C) (Oral)   Resp 16   Ht 5' 5\" (1.651 m)   Wt 203 lb (92.1 kg)   SpO2 99%   BMI 33.78 kg/m²     A&O x3  Affect is appropriate. Mood stable  No apparent distress  Anicteric, no JVD, adenopathy. Goiter noted   No carotid bruits or radiated murmur  Lungs clear to auscultation, no wheezes or rales  Heart showed regular rate and rhythm. No murmur, rubs, gallops  Abdomen soft nontender, no hepatosplenomegaly or masses. Extremities without edema.   Pulses 1-2+ symmetrically    LABS  From 12/08                                      ua neg  From 7/09 showed   gluc 98,   cr 0.90,     alt 10,          chol 223, tg 75, hdl 71, ldl-c 137  From 12/11 showed               ldl-p 1525, chol 247, tg 61, hdl 78, ldl-c 157, wbc 7.6, hb 11.7, plt 265  From 6/12 showed                   ldl-p 1177, chol 238, tg 48, hdl 78, ldl-c 152,           tsh 1.81  From 1/13 showed   gluc 99,   cr 0.95, gfr 72,  alt<5,  ldl-p 1238, chol 206, tg 56, hdl 75, ldl-c 120, wbc 8.2, hb 11.4, plt 259  From 7/13 showed               chol 221, tg 72, hdl 72, ldl-c 135  From 2/14 showed   gluc 101, cr 0.78, gfr 90,                   tsh 2.04  From 10/14 showed gluc 94,   cr 1.02, gfr>60, alt 5,   hba1c 6.2, chol 231, tg 73, hdl 83, ldl-c 133, wbc 7.9, hb 11.9, plt 281  From 3/16 showed   gluc 107, cr 0.75, gfr>60, alt 11, hba1c 5.2, chol 229, tg 74, hdl 90, ldl-c 124, wbc 7.6, hb 12.0, plt 256, tsh 1.22, ua neg  From 9/16 showed   gluc 105, cr 0.89, gfr>60, alt 14,         chol 198, tg 54, hdl 93, ldl-c 94  From 4/17 showed               chol 204, tg 69, hdl 83, ldl-c 107, wbc 8.5, hb 11.5, plt 247, tsh 0.73  From 10/17 showed gluc 107, cr 0.90, gfr>60, alt 14, hba1c 5.9, chol 216, tg 68, hdl 99, ldl-c 103, wbc 6.8, hb 11.7, plt 262  From 4/18 showed   gluc 110, cr 0.81, gfr>60,    hba1c 6.1,                tsh 0.93, ft4 1.30  From 10/18 showed gluc 111, cr 0.87, gfr>60,    hba1c 6.0, chol 201, tg 72, hdl 94, ldl-c 93    Results for orders placed or performed during the hospital encounter of 07/22/19   CBC W/O DIFF   Result Value Ref Range    WBC 6.9 4.6 - 13.2 K/uL    RBC 4.01 (L) 4.20 - 5.30 M/uL    HGB 11.6 (L) 12.0 - 16.0 g/dL    HCT 36.5 35.0 - 45.0 %    MCV 91.0 74.0 - 97.0 FL    MCH 28.9 24.0 - 34.0 PG    MCHC 31.8 31.0 - 37.0 g/dL    RDW 13.4 11.6 - 14.5 %    PLATELET 977 660 - 779 K/uL    MPV 10.9 9.2 - 61.0 FL   METABOLIC PANEL, COMPREHENSIVE   Result Value Ref Range    Sodium 141 136 - 145 mmol/L    Potassium 3.2 (L) 3.5 - 5.5 mmol/L    Chloride 106 100 - 111 mmol/L    CO2 29 21 - 32 mmol/L    Anion gap 6 3.0 - 18 mmol/L    Glucose 110 (H) 74 - 99 mg/dL    BUN 16 7.0 - 18 MG/DL    Creatinine 0.94 0.6 - 1.3 MG/DL    BUN/Creatinine ratio 17 12 - 20      GFR est AA >60 >60 ml/min/1.73m2    GFR est non-AA 58 (L) >60 ml/min/1.73m2    Calcium 9.1 8.5 - 10.1 MG/DL    Bilirubin, total 0.3 0.2 - 1.0 MG/DL    ALT (SGPT) 11 (L) 13 - 56 U/L    AST (SGOT) 10 10 - 38 U/L    Alk. phosphatase 96 45 - 117 U/L    Protein, total 6.6 6.4 - 8.2 g/dL    Albumin 3.4 3.4 - 5.0 g/dL    Globulin 3.2 2.0 - 4.0 g/dL    A-G Ratio 1.1 0.8 - 1.7     HEMOGLOBIN A1C WITH EAG   Result Value Ref Range    Hemoglobin A1c 6.5 (H) 4.2 - 5.6 %    Est. average glucose 140 mg/dL   LIPID PANEL   Result Value Ref Range    LIPID PROFILE          Cholesterol, total 203 (H) <200 MG/DL    Triglyceride 77 <150 MG/DL    HDL Cholesterol 85 (H) 40 - 60 MG/DL    LDL, calculated 102.6 (H) 0 - 100 MG/DL    VLDL, calculated 15.4 MG/DL    CHOL/HDL Ratio 2.4 0 - 5.0     TSH AND FREE T4   Result Value Ref Range    TSH 1.48 0.36 - 3.74 uIU/mL    T4, Free 1.3 0.7 - 1.5 NG/DL     Patient Active Problem List   Diagnosis Code    Multinodular goiter neg biopsy 2007 Dr. Jhon Feldman E04.2    Dyslipidemia E78.5    IFG (impaired fasting glucose) R73.01    Essential hypertension I10    Degenerative arthritis of knee, bilateral M17.0    GERD without esophagitis K21.9    Advance directive in chart Z78.9    Obesity (BMI 30-39. 9) E66.9     ASSESSMENT AND PLAN:   1. Hypertension. Continue current regimen although w dx below, consider changing to acei in future. 2.  Obesity. See separate discussion  3. Allergic rhinitis. Prn antihistamines and flonase  4. Fibrocystic breasts. F/U GYN and mammos  5. DJD. Prn nsaids  6. General.  Ca/D advocated. 7.  Thyroid. Doing well  8. Dyslipidemia. Continue current regimen. 9.  Possible early DM. Long discussion. For now, she is willing to review literature she got years ago and will go for the free classes. Declined metformin, monitor, etc.  Reevaluate in 3 months, wt loss is paramount  10. Insomnia. Refilled trazodone  11. Hypokalemia. Supplement        RTC 11/19    Above conditions discussed at length and patient vocalized understanding.   All questions answered to patient satisfaction

## 2019-07-29 ENCOUNTER — OFFICE VISIT (OUTPATIENT)
Dept: INTERNAL MEDICINE CLINIC | Age: 74
End: 2019-07-29

## 2019-07-29 ENCOUNTER — TELEPHONE (OUTPATIENT)
Dept: INTERNAL MEDICINE CLINIC | Age: 74
End: 2019-07-29

## 2019-07-29 VITALS
HEIGHT: 65 IN | OXYGEN SATURATION: 99 % | TEMPERATURE: 98 F | DIASTOLIC BLOOD PRESSURE: 73 MMHG | BODY MASS INDEX: 33.82 KG/M2 | RESPIRATION RATE: 16 BRPM | SYSTOLIC BLOOD PRESSURE: 128 MMHG | HEART RATE: 66 BPM | WEIGHT: 203 LBS

## 2019-07-29 DIAGNOSIS — E78.5 DYSLIPIDEMIA: ICD-10-CM

## 2019-07-29 DIAGNOSIS — E87.6 HYPOKALEMIA: ICD-10-CM

## 2019-07-29 DIAGNOSIS — R73.01 IFG (IMPAIRED FASTING GLUCOSE): Primary | ICD-10-CM

## 2019-07-29 DIAGNOSIS — G47.00 INSOMNIA, UNSPECIFIED TYPE: ICD-10-CM

## 2019-07-29 DIAGNOSIS — M17.0 OSTEOARTHRITIS OF BOTH KNEES, UNSPECIFIED OSTEOARTHRITIS TYPE: ICD-10-CM

## 2019-07-29 DIAGNOSIS — E04.2 MULTINODULAR GOITER: ICD-10-CM

## 2019-07-29 DIAGNOSIS — I10 ESSENTIAL HYPERTENSION: ICD-10-CM

## 2019-07-29 DIAGNOSIS — K21.9 GERD WITHOUT ESOPHAGITIS: ICD-10-CM

## 2019-07-29 RX ORDER — POTASSIUM CHLORIDE 750 MG/1
10 TABLET, EXTENDED RELEASE ORAL DAILY
Qty: 30 TAB | Refills: 2 | Status: SHIPPED | OUTPATIENT
Start: 2019-07-29 | End: 2019-10-10 | Stop reason: SDUPTHER

## 2019-07-29 RX ORDER — TRAZODONE HYDROCHLORIDE 50 MG/1
TABLET ORAL
Qty: 30 TAB | Refills: 2 | Status: SHIPPED | OUTPATIENT
Start: 2019-07-29 | End: 2019-08-22 | Stop reason: SDUPTHER

## 2019-07-29 NOTE — PROGRESS NOTES
Jenny Ha presents today for   Chief Complaint   Patient presents with    Hypertension     follow up labs done on 7/22/19              Depression Screening:  3 most recent PHQ Screens 7/29/2019   Little interest or pleasure in doing things Not at all   Feeling down, depressed, irritable, or hopeless Not at all   Total Score PHQ 2 0       Learning Assessment:  Learning Assessment 10/17/2017   PRIMARY LEARNER Patient   HIGHEST LEVEL OF EDUCATION - PRIMARY LEARNER  -   BARRIERS PRIMARY LEARNER NONE   CO-LEARNER CAREGIVER No   PRIMARY LANGUAGE ENGLISH   LEARNER PREFERENCE PRIMARY OTHER (COMMENT)   ANSWERED BY patient   RELATIONSHIP SELF       Abuse Screening:  Abuse Screening Questionnaire 7/29/2019   Do you ever feel afraid of your partner? N   Are you in a relationship with someone who physically or mentally threatens you? N   Is it safe for you to go home? Y       Fall Risk  Fall Risk Assessment, last 12 mths 7/29/2019   Able to walk? Yes   Fall in past 12 months? No           Coordination of Care:  1. Have you been to the ER, urgent care clinic since your last visit? Hospitalized since your last visit?  no    2. Have you seen or consulted any other health care providers outside of the [address] [place] [address] since your last visit? Include any pap smears or colon screening.  no

## 2019-07-29 NOTE — PROGRESS NOTES
Discussion about weight loss    Body mass index is 33.78 kg/m². Discussion about modifying behaviors regarding diet and exercise undertaken    Increase activity as tolerated   - she recently rejoined the [employer] and plans on going 2-3x/week    Cut back carbs and fatty foods.     Calorie counting would be ideal   - she will revisit literature she has    Option of appetite suppressants discussed briefly and declined   - due to concerns about sfx and cost    Discussed sending to nutritionist for further teaching - she will go for the free diabetic classes being offered    Intermittent fasting discussed at length, concepts explained, risks and benefits elaborated upon   - pt not able to do this    We set initial target of up to 5% wt loss as being realistic over the next 6-12 months and possibly aiming for higher than that in the future     Will come back for reevaluation and further management at subsequent visits which will be determined by patient response    Total time 15 min

## 2019-08-22 DIAGNOSIS — G47.00 INSOMNIA, UNSPECIFIED TYPE: ICD-10-CM

## 2019-08-24 RX ORDER — TRAZODONE HYDROCHLORIDE 50 MG/1
TABLET ORAL
Qty: 90 TAB | Refills: 2 | Status: SHIPPED | OUTPATIENT
Start: 2019-08-24 | End: 2019-10-10 | Stop reason: SDUPTHER

## 2019-09-03 RX ORDER — FLUTICASONE PROPIONATE 50 MCG
SPRAY, SUSPENSION (ML) NASAL
Qty: 48 G | Refills: 3 | Status: SHIPPED | OUTPATIENT
Start: 2019-09-03

## 2019-10-10 DIAGNOSIS — E87.6 HYPOKALEMIA: ICD-10-CM

## 2019-10-10 DIAGNOSIS — G47.00 INSOMNIA, UNSPECIFIED TYPE: ICD-10-CM

## 2019-10-10 RX ORDER — POTASSIUM CHLORIDE 750 MG/1
10 TABLET, EXTENDED RELEASE ORAL DAILY
Qty: 90 TAB | Refills: 3 | Status: SHIPPED | OUTPATIENT
Start: 2019-10-10 | End: 2020-01-20 | Stop reason: SDUPTHER

## 2019-10-10 RX ORDER — TRAZODONE HYDROCHLORIDE 50 MG/1
50 TABLET ORAL
Qty: 90 TAB | Refills: 3 | Status: SHIPPED | OUTPATIENT
Start: 2019-10-10 | End: 2020-01-20 | Stop reason: SDUPTHER

## 2019-10-10 NOTE — TELEPHONE ENCOUNTER
Alcira Deng is requesting a 90 day supply    Last Visit: 7/29/19 with MD Elise Billing  Next Appointment: 12/5/19 with MD Elise Billing    Requested Prescriptions     Pending Prescriptions Disp Refills    potassium chloride (KLOR-CON) 10 mEq tablet 90 Tab 3     Sig: Take 1 Tab by mouth daily.  traZODone (DESYREL) 50 mg tablet 90 Tab 3     Sig: Take 1 Tab by mouth nightly as needed for Sleep.

## 2019-10-25 ENCOUNTER — TELEPHONE (OUTPATIENT)
Dept: INTERNAL MEDICINE CLINIC | Age: 74
End: 2019-10-25

## 2019-10-25 NOTE — TELEPHONE ENCOUNTER
Pt calling says at last appt she had discussed the pain in her shoulder. Bursitis. Says she has been trying to deal with it but she just cant. Wants to know if RD will call in a muscle relaxer for her?     Martine mccray [facility].

## 2019-10-25 NOTE — TELEPHONE ENCOUNTER
Called and spoke to patient about message below. Patient stated that she will continue to try nsaid but she has been taking over the counter Ibuprofen with no relief. Patient stated she will call HEATHER to inquire next week if not better.

## 2019-10-25 NOTE — TELEPHONE ENCOUNTER
Bursitis is not helped by muscle relaxant - only muscle spasms  nsaid might help - otc in aleve of motrin/advil  If no better, may need cortisone shot - schedule with ortho if that's the case - dr Catalina Bo

## 2019-10-30 ENCOUNTER — HOSPITAL ENCOUNTER (OUTPATIENT)
Dept: MAMMOGRAPHY | Age: 74
Discharge: HOME | End: 2019-10-30
Attending: OBSTETRICS & GYNECOLOGY
Payer: MEDICARE

## 2019-10-30 DIAGNOSIS — Z12.31 VISIT FOR SCREENING MAMMOGRAM: ICD-10-CM

## 2019-10-30 PROCEDURE — 77063 BREAST TOMOSYNTHESIS BI: CPT

## 2019-11-20 ENCOUNTER — HOSPITAL ENCOUNTER (OUTPATIENT)
Dept: LAB | Age: 74
Discharge: HOME | End: 2019-11-20
Payer: MEDICARE

## 2019-11-20 ENCOUNTER — LAB ONLY (OUTPATIENT)
Dept: INTERNAL MEDICINE CLINIC | Age: 74
End: 2019-11-20

## 2019-11-20 DIAGNOSIS — I10 ESSENTIAL HYPERTENSION: ICD-10-CM

## 2019-11-20 DIAGNOSIS — R73.01 IFG (IMPAIRED FASTING GLUCOSE): ICD-10-CM

## 2019-11-20 DIAGNOSIS — I10 ESSENTIAL HYPERTENSION: Primary | ICD-10-CM

## 2019-11-20 LAB
ANION GAP SERPL CALC-SCNC: 5 MMOL/L (ref 3–18)
BUN SERPL-MCNC: 14 MG/DL (ref 7–18)
BUN/CREAT SERPL: 14 (ref 12–20)
CALCIUM SERPL-MCNC: 9.4 MG/DL (ref 8.5–10.1)
CHLORIDE SERPL-SCNC: 106 MMOL/L (ref 100–111)
CO2 SERPL-SCNC: 29 MMOL/L (ref 21–32)
CREAT SERPL-MCNC: 0.99 MG/DL (ref 0.6–1.3)
GLUCOSE SERPL-MCNC: 104 MG/DL (ref 74–99)
HBA1C MFR BLD: 6 % (ref 4.2–5.6)
POTASSIUM SERPL-SCNC: 3.6 MMOL/L (ref 3.5–5.5)
SODIUM SERPL-SCNC: 140 MMOL/L (ref 136–145)

## 2019-11-20 PROCEDURE — 80048 BASIC METABOLIC PNL TOTAL CA: CPT

## 2019-11-20 PROCEDURE — 83036 HEMOGLOBIN GLYCOSYLATED A1C: CPT

## 2019-11-20 PROCEDURE — 82043 UR ALBUMIN QUANTITATIVE: CPT

## 2019-11-20 PROCEDURE — 36415 COLL VENOUS BLD VENIPUNCTURE: CPT

## 2019-11-21 LAB
CREAT UR-MCNC: 158 MG/DL (ref 30–125)
MICROALBUMIN UR-MCNC: 0.77 MG/DL (ref 0–3)
MICROALBUMIN/CREAT UR-RTO: 5 MG/G (ref 0–30)

## 2019-12-03 NOTE — PROGRESS NOTES
This is a Subsequent Medicare Annual Wellness Visit     I have reviewed the patient's medical history in detail and updated the computerized patient record. History     Past Medical History:   Diagnosis Date    Allergic rhinitis     Anemia     chronic    Cataract     Chronic constipation     DJD (degenerative joint disease) of knee     Dr. Sravan Sanchez Dyslipidemia     10 year calculated risk score was 10.3% (7/13); 11.3% (10/14); 15.2% (3/16)    FHx: heart disease     Fibrocystic breast     neg bx x2 1970s    GERD (gastroesophageal reflux disease)     HTN (hypertension)     IFG (impaired fasting glucose) 2/14    Morbid obesity (HCC)     peak weight 230 lbs, bmi 38.8 from 10/11; start weight 209 lbs but not doing; W - 7/19    Multinodular goiter 2007    negative biopsy Dr. Shar Schneider      Past Surgical History:   Procedure Laterality Date    DEXA BONE DENSITY STUDY AXIAL      DEXA t score 0.2 spine, -0.2 hip (9/09); -0.1 spine, -0.2 hip (3/14)    HX APPENDECTOMY      HX BREAST BIOPSY      1967 (RT)/ 1971 (LT)   Lakhwinder Diaz negative 2002, Dr. Maral Smith negative 4/3/12    HX DILATION AND CURETTAGE      x2     HX HEMORRHOIDECTOMY      HX HYSTERECTOMY      due to  leimyomata    HX PELVIC LAPAROSCOPY       Current Outpatient Medications   Medication Sig Dispense Refill    diazePAM (VALIUM) 5 mg tablet TAKE 1 TABLET BY MOUTH EVERY 12 HOURS AS NEEDED FOR ANXIETY 30 Tab 0    potassium chloride (KLOR-CON) 10 mEq tablet Take 1 Tab by mouth daily. 90 Tab 3    traZODone (DESYREL) 50 mg tablet Take 1 Tab by mouth nightly as needed for Sleep.  90 Tab 3    fluticasone propionate (FLONASE) 50 mcg/actuation nasal spray USE 2 SPRAYS IN EACH NOSTRIL DAILY 48 g 3    triamterene-hydroCHLOROthiazide (MAXZIDE) 75-50 mg per tablet TAKE 1 TABLET EVERY DAY 90 Tab 3    pravastatin (PRAVACHOL) 20 mg tablet TAKE 1 TABLET EVERY NIGHT 90 Tab 3     Allergies   Allergen Reactions    Codeine Itching \"Loopy\"     Family History   Problem Relation Age of Onset    Hypertension Mother     Hypertension Father     Cancer Father         pancreatic    Hypertension Brother     Cancer Brother 46        lung    Hypertension Sister     Diabetes Brother     Heart Disease Sister      Social History     Tobacco Use    Smoking status: Never Smoker    Smokeless tobacco: Never Used   Substance Use Topics    Alcohol use: No     Depression Risk Factor Screening:     3 most recent PHQ Screens 7/29/2019   Little interest or pleasure in doing things Not at all   Feeling down, depressed, irritable, or hopeless Not at all   Total Score PHQ 2 0     SCREENINGS  Colonoscopy last done 2012 Dr Grady Harada last done 10/18  DEXA last done 3/14  Gyn last done >5 yrs    Immunization History   Administered Date(s) Administered    (RETIRED) Pneumococcal Vaccine (Unspecified Type) 12/20/2011    H1N1 Influenza Virus Vaccine 12/01/2009    Influenza High Dose Vaccine PF 11/19/2013, 09/20/2016, 10/17/2017, 09/24/2019    Influenza Vaccine 10/31/2014, 11/19/2015    Influenza Vaccine Whole 09/20/2011    PPD 09/24/2012    Pneumococcal Conjugate (PCV-13) 08/27/2015    TD Vaccine 01/01/1999    Tdap 10/01/2015    Zoster 12/20/2011     Alcohol Risk Factor Screening: On any occasion during the past 3 months, have you had more than 3 drinks containing alcohol? No    Do you average more than 7 drinks per week? No      Functional Ability and Level of Safety:     Hearing Loss   normal-to-mild    Vision - no acute complaints and is followed by Dr Akers  of Daily Living   Self-care. Requires assistance with: no ADLs    Fall Risk     Fall Risk Assessment, last 12 mths 7/29/2019   Able to walk? Yes   Fall in past 12 months? No     Abuse Screen   Patient is not abused    Review of Systems   A comprehensive review of systems was negative except for that written in the HPI.     Physical Examination     Evaluation of Cognitive Function:  Mood/affect:  neutral  Appearance: age appropriate, overweight, well dressed and within normal Limits  Family member/caregiver input: na    Patient Care Team:  Antonia Mroeno MD as PCP - General (Internal Medicine)  Antonia Moreno MD as PCP - [facility] Empaneled Provider    Advice/Referrals/Counseling   Education and counseling provided:  Are appropriate based on today's review and evaluation  End-of-Life planning (with patient's consent)  Pneumococcal Vaccine  Influenza Vaccine  Screening Mammography  Colorectal cancer screening tests  Cardiovascular screening blood test  Bone mass measurement (DEXA)  Diabetes screening test    Assessment/Plan       ICD-10-CM ICD-9-CM    1. Medicare annual wellness visit, subsequent Z00.00 V70.0    2. Grief reaction F43.21 309.0 diazePAM (VALIUM) 5 mg tablet   3. Multinodular goiter neg biopsy 2007 Dr. Tiana Lux E04.2 241.1 TSH 3RD GENERATION   4. Dyslipidemia E78.5 272.4 LIPID PANEL   5. IFG (impaired fasting glucose) R73.01 790.21 HEMOGLOBIN A1C W/O EAG      CBC W/O DIFF   6. GERD without esophagitis K21.9 530.81    7. Essential hypertension I10 401.9    8. BMI 33.0-33.9,adult Z68.33 V85.33 IA BEHAVIOR  OBESITY 15M   9. Osteoarthritis of both knees, unspecified osteoarthritis type M17.0 715.96    10. Advanced directives, counseling/discussion Z71.89 V65.49 ADVANCE CARE PLANNING FIRST 30 MINS   11. Screening for alcoholism Z13.39 V79.1 IA ANNUAL ALCOHOL SCREEN 15 MIN   12. Screening for depression Z13.31 V79.0 DEPRESSION SCREEN ANNUAL     current treatment plan is effective, no change in therapy  lab results and schedule of future lab studies reviewed with patient  reviewed diet, exercise and weight control  cardiovascular risk and specific lipid/LDL goals reviewed. End of life discussion undertaken.   amd on file  Flu high dose declined  shingrix advocated  Colonoscopy to be scheduled 2022  Mammo to be scheduled 2019  DEXA done

## 2019-12-03 NOTE — PROGRESS NOTES
68 y.o. black female who presents for evaluation. No cardiovascular complaints. She has been doing better with going to the [facility]    No GI or  complaints     No sx referable to the thyroid    Denies polyuria, polydipsia, nocturia, vision change. Her a1c was 6.5 at the last visit and she declined metformin and official teaching.   She has done better and actually lost weight but regained it during the last couple weeks with the holiday    Vitals 12/5/2019 7/29/2019 10/29/2018 4/30/2018   Weight 207 lb 203 lb 206 lb 209 lb     She is needing higher dosing of the trazodone for sleep    LAST MEDICARE WELLNESS EXAM: 3/5/14, 8/27/15, 9/20/16, 10/17/17, 10/29/18, 12/5/19    Past Medical History:   Diagnosis Date    Allergic rhinitis     Anemia     chronic    Cataract     Chronic constipation     DJD (degenerative joint disease) of knee     Dr. Santiago Diallo Dyslipidemia     10 year calculated risk score was 10.3% (7/13); 11.3% (10/14); 15.2% (3/16)    FHx: heart disease     Fibrocystic breast     neg bx x2 1970s    GERD (gastroesophageal reflux disease)     HTN (hypertension)     IFG (impaired fasting glucose) 2/14    Morbid obesity ([address]     peak weight 230 lbs, bmi 38.8 from 10/11; start weight 209 lbs but not doing; W - 7/19    Multinodular goiter 2007    negative biopsy Dr. Chris Hale     Past Surgical History:   Procedure Laterality Date    DEXA BONE DENSITY STUDY AXIAL      DEXA t score 0.2 spine, -0.2 hip (9/09); -0.1 spine, -0.2 hip (3/14)    HX APPENDECTOMY      HX BREAST BIOPSY      1967 (RT)/ 1971 (LT)   Alexei Hill negative 2002, Dr. Latoya King negative 4/3/12    HX DILATION AND CURETTAGE      x2     HX HEMORRHOIDECTOMY      HX HYSTERECTOMY      due to  leimyomata    HX PELVIC LAPAROSCOPY       Allergies   Allergen Reactions    Codeine Itching     \"Loopy\"     Current Outpatient Medications   Medication Sig    diazePAM (VALIUM) 5 mg tablet TAKE 1 TABLET BY MOUTH EVERY 12 HOURS AS NEEDED FOR ANXIETY    potassium chloride (KLOR-CON) 10 mEq tablet Take 1 Tab by mouth daily.  traZODone (DESYREL) 50 mg tablet Take 1 Tab by mouth nightly as needed for Sleep.  fluticasone propionate (FLONASE) 50 mcg/actuation nasal spray USE 2 SPRAYS IN EACH NOSTRIL DAILY    triamterene-hydroCHLOROthiazide (MAXZIDE) 75-50 mg per tablet TAKE 1 TABLET EVERY DAY    pravastatin (PRAVACHOL) 20 mg tablet TAKE 1 TABLET EVERY NIGHT     No current facility-administered medications for this visit. REVIEW OF SYSTEMS: gyn Dr Obie Vizcarra 3/14, mammo 10/19, DEXA 3/14, colo 4/12 Dr Thierno Mei, sees Dr Carmen Powell no vision change or eye pain  Oral  no mouth pain, tongue or tooth problems  Ears  no hearing loss, ear pain, fullness, no swallowing problems  Cardiac  no CP, PND, orthopnea, edema, palpitations or syncope  Chest  no breast masses  Resp  no wheezing, chronic coughing, dyspnea  GI  no heartburn, nausea, vomiting, change in bowel habits, bleeding, hemorrhoids  Urinary  no dysuria, hematuria, flank pain, urgency, frequency    Visit Vitals  /68   Pulse 71   Temp 98 °F (36.7 °C) (Oral)   Resp 14   Ht 5' 5\" (1.651 m)   Wt 207 lb (93.9 kg)   SpO2 100%   BMI 34.45 kg/m²     A&O x3  Affect is appropriate. Mood stable  No apparent distress  Anicteric, no JVD, adenopathy. Goiter noted   No carotid bruits or radiated murmur  Lungs clear to auscultation, no wheezes or rales  Heart showed regular rate and rhythm. No murmur, rubs, gallops  Abdomen soft nontender, no hepatosplenomegaly or masses. Extremities without edema.   Pulses 1-2+ symmetrically    LABS  From 12/08                                      ua neg  From 7/09 showed   gluc 98,   cr 0.90,     alt 10,          chol 223, tg 75, hdl 71, ldl-c 137  From 12/11 showed               ldl-p 1525, chol 247, tg 61, hdl 78, ldl-c 157, wbc 7.6, hb 11.7, plt 265  From 6/12 showed                   ldl-p 1177, chol 238, tg 48, hdl 78, ldl-c 152, tsh 1.81  From 1/13 showed   gluc 99,   cr 0.95, gfr 72,  alt<5,  ldl-p 1238, chol 206, tg 56, hdl 75, ldl-c 120, wbc 8.2, hb 11.4, plt 259  From 7/13 showed               chol 221, tg 72, hdl 72, ldl-c 135  From 2/14 showed   gluc 101, cr 0.78, gfr 90,                   tsh 2.04  From 10/14 showed gluc 94,   cr 1.02, gfr>60, alt 5,   hba1c 6.2, chol 231, tg 73, hdl 83, ldl-c 133, wbc 7.9, hb 11.9, plt 281  From 3/16 showed   gluc 107, cr 0.75, gfr>60, alt 11, hba1c 5.2, chol 229, tg 74, hdl 90, ldl-c 124, wbc 7.6, hb 12.0, plt 256, tsh 1.22, ua neg  From 9/16 showed   gluc 105, cr 0.89, gfr>60, alt 14,         chol 198, tg 54, hdl 93, ldl-c 94  From 4/17 showed               chol 204, tg 69, hdl 83, ldl-c 107, wbc 8.5, hb 11.5, plt 247, tsh 0.73  From 10/17 showed gluc 107, cr 0.90, gfr>60, alt 14, hba1c 5.9, chol 216, tg 68, hdl 99, ldl-c 103, wbc 6.8, hb 11.7, plt 262  From 4/18 showed   gluc 110, cr 0.81, gfr>60,    hba1c 6.1,                tsh 0.93, ft4 1.30  From 10/18 showed gluc 111, cr 0.87, gfr>60,    hba1c 6.0, chol 201, tg 72, hdl 94, ldl-c 93  From 7/19 showed   gluc 110, cr 0.94, gfr 58,  alt 11, hba1c 6.5, chol 203, tg 77, hdl 85, ldl-c 103, wbc 6.9, hb 11.6, plt 249, tsh 1.48, ft4 1.30    Results for orders placed or performed during the hospital encounter of 11/20/19   HEMOGLOBIN A1C W/O EAG   Result Value Ref Range    Hemoglobin A1c 6.0 (H) 4.2 - 5.6 %   METABOLIC PANEL, BASIC   Result Value Ref Range    Sodium 140 136 - 145 mmol/L    Potassium 3.6 3.5 - 5.5 mmol/L    Chloride 106 100 - 111 mmol/L    CO2 29 21 - 32 mmol/L    Anion gap 5 3.0 - 18 mmol/L    Glucose 104 (H) 74 - 99 mg/dL    BUN 14 7.0 - 18 MG/DL    Creatinine 0.99 0.6 - 1.3 MG/DL    BUN/Creatinine ratio 14 12 - 20      GFR est AA >60 >60 ml/min/1.73m2    GFR est non-AA 55 (L) >60 ml/min/1.73m2    Calcium 9.4 8.5 - 10.1 MG/DL   MICROALBUMIN, UR, RAND W/ MICROALB/CREAT RATIO   Result Value Ref Range    Microalbumin,urine random 0.77 0 - 3.0 MG/DL    Creatinine, urine 158.00 (H) 30 - 125 mg/dL    Microalbumin/Creat ratio (mg/g creat) 5 0 - 30 mg/g     Patient Active Problem List   Diagnosis Code    Multinodular goiter neg biopsy 2007 Dr. Vignesh Zapata E04.2    Dyslipidemia E78.5    IFG (impaired fasting glucose) R73.01    Essential hypertension I10    Degenerative arthritis of knee, bilateral M17.0    GERD without esophagitis K21.9    Advance directive in chart Z78.9    Obesity (BMI 30-39. 9) E66.9    BMI 33.0-33.9,adult Z68.33     ASSESSMENT AND PLAN:   1. Hypertension. Continue current regimen , consider acei in future  2. Obesity. See separate discussion  3. Allergic rhinitis. Prn antihistamines and flonase  4. Fibrocystic breasts. F/U GYN and mammos  5. DJD. Prn nsaids  6. General.  Ca/D advocated. 7.  Thyroid. Check next draw  8. Dyslipidemia. Continue current regimen. 9.  Possible early DM. Continue current  10. Insomnia. Inc trazodone to 100 and call w update        RTC 6/20    Above conditions discussed at length and patient vocalized understanding.   All questions answered to patient satisfaction

## 2019-12-05 ENCOUNTER — OFFICE VISIT (OUTPATIENT)
Dept: INTERNAL MEDICINE CLINIC | Age: 74
End: 2019-12-05

## 2019-12-05 VITALS
HEIGHT: 65 IN | WEIGHT: 207 LBS | HEART RATE: 71 BPM | DIASTOLIC BLOOD PRESSURE: 68 MMHG | BODY MASS INDEX: 34.49 KG/M2 | OXYGEN SATURATION: 100 % | RESPIRATION RATE: 14 BRPM | SYSTOLIC BLOOD PRESSURE: 118 MMHG | TEMPERATURE: 98 F

## 2019-12-05 DIAGNOSIS — E78.5 DYSLIPIDEMIA: ICD-10-CM

## 2019-12-05 DIAGNOSIS — K21.9 GERD WITHOUT ESOPHAGITIS: ICD-10-CM

## 2019-12-05 DIAGNOSIS — M17.0 OSTEOARTHRITIS OF BOTH KNEES, UNSPECIFIED OSTEOARTHRITIS TYPE: ICD-10-CM

## 2019-12-05 DIAGNOSIS — I10 ESSENTIAL HYPERTENSION: ICD-10-CM

## 2019-12-05 DIAGNOSIS — Z71.89 ADVANCED DIRECTIVES, COUNSELING/DISCUSSION: ICD-10-CM

## 2019-12-05 DIAGNOSIS — Z13.39 SCREENING FOR ALCOHOLISM: ICD-10-CM

## 2019-12-05 DIAGNOSIS — F43.21 GRIEF REACTION: ICD-10-CM

## 2019-12-05 DIAGNOSIS — E04.2 MULTINODULAR GOITER: ICD-10-CM

## 2019-12-05 DIAGNOSIS — R73.01 IFG (IMPAIRED FASTING GLUCOSE): ICD-10-CM

## 2019-12-05 DIAGNOSIS — Z00.00 MEDICARE ANNUAL WELLNESS VISIT, SUBSEQUENT: Primary | ICD-10-CM

## 2019-12-05 DIAGNOSIS — Z13.31 SCREENING FOR DEPRESSION: ICD-10-CM

## 2019-12-05 RX ORDER — DIAZEPAM 5 MG/1
TABLET ORAL
Qty: 30 TAB | Refills: 0 | Status: SHIPPED | OUTPATIENT
Start: 2019-12-05 | End: 2020-01-20 | Stop reason: SDUPTHER

## 2019-12-05 NOTE — ACP (ADVANCE CARE PLANNING)

## 2019-12-05 NOTE — PROGRESS NOTES
Discussion about weight loss    Body mass index is 34.45 kg/m². Vitals 12/5/2019 7/29/2019 10/29/2018 4/30/2018   Weight 207 lb 203 lb 206 lb 209 lb   SpO2 100 99 99 100   BSA 2.08 m2 2.06 m2 2.07 m2 2.09 m2   BMI 34.45 kg/m2 33.78 kg/m2 34.28 kg/m2 34.78 kg/m2     Discussion about modifying behaviors regarding diet and exercise undertaken    Increase activity as tolerated   - she is going to the [facility] regularly    Cut back carbs and fatty foods.     Calorie counting would be ideal   - she has done much better with this despite no wt loss    Option of appetite suppressants discussed briefly and declined   - due to concerns about sfx and cost    Discussed sending to nutritionist for further teaching    - she had gone for the free classes    Intermittent fasting discussed at length, concepts explained, risks and benefits elaborated upon   - pt not able to do this    We reiterated target of up to 5% wt loss as being realistic over the next 6-12 months and possibly aiming for higher than that in the future     Will come back for reevaluation and further management at subsequent visits which will be determined by patient response    Total time 15 min

## 2019-12-05 NOTE — PATIENT INSTRUCTIONS
Medicare Wellness Visit, Female The best way to live healthy is to have a lifestyle where you eat a well-balanced diet, exercise regularly, limit alcohol use, and quit all forms of tobacco/nicotine, if applicable. Regular preventive services are another way to keep healthy. Preventive services (vaccines, screening tests, monitoring & exams) can help personalize your care plan, which helps you manage your own care. Screening tests can find health problems at the earliest stages, when they are easiest to treat. [facility] follows the current, evidence-based guidelines published by the [facility] ([facility]) when recommending preventive services for our patients. Because we follow these guidelines, sometimes recommendations change over time as research supports it. (For example, mammograms used to be recommended annually. Even though Medicare will still pay for an annual mammogram, the newer guidelines recommend a mammogram every two years for women of average risk). Of course, you and your doctor may decide to screen more often for some diseases, based on your risk and your co-morbidities (chronic disease you are already diagnosed with). Preventive services for you include: - Medicare offers their members a free annual wellness visit, which is time for you and your primary care provider to discuss and plan for your preventive service needs. Take advantage of this benefit every year! 
-All adults over the age of 72 should receive the recommended pneumonia vaccines. Current USPSTF guidelines recommend a series of two vaccines for the best pneumonia protection.  
-All adults should have a flu vaccine yearly and a tetanus vaccine every 10 years.  
-All adults age 48 and older should receive the shingles vaccines (series of two vaccines). -All adults age 38-68 who are overweight should have a diabetes screening test once every three years. -All adults born between [birth year] and 1965 should be screened once for Hepatitis C. 
-Other screening tests and preventive services for persons with diabetes include: an eye exam to screen for diabetic retinopathy, a kidney function test, a foot exam, and stricter control over your cholesterol.  
-Cardiovascular screening for adults with routine risk involves an electrocardiogram (ECG) at intervals determined by your doctor.  
-Colorectal cancer screenings should be done for adults age 54-65 with no increased risk factors for colorectal cancer. There are a number of acceptable methods of screening for this type of cancer. Each test has its own benefits and drawbacks. Discuss with your doctor what is most appropriate for you during your annual wellness visit. The different tests include: colonoscopy (considered the best screening method), a fecal occult blood test, a fecal DNA test, and sigmoidoscopy. 
 
-A bone mass density test is recommended when a woman turns 65 to screen for osteoporosis. This test is only recommended one time, as a screening. Some providers will use this same test as a disease monitoring tool if you already have osteoporosis. -Breast cancer screenings are recommended every other year for women of normal risk, age 54-69. 
-Cervical cancer screenings for women over age 72 are only recommended with certain risk factors. Here is a list of your current Health Maintenance items (your personalized list of preventive services) with a due date: 
Health Maintenance Due Topic Date Due  Shingles Vaccine (1 of 2) 12/19/1995  Pneumococcal Vaccine (2 of 2 - PPSV23) 12/20/2016  Glaucoma Screening   09/15/2018

## 2019-12-05 NOTE — PROGRESS NOTES
Benjie Santana presents today for   Chief Complaint   Patient presents with    Annual Wellness Visit    Cholesterol Problem     labs              Depression Screening:  3 most recent PHQ Screens 7/29/2019   Little interest or pleasure in doing things Not at all   Feeling down, depressed, irritable, or hopeless Not at all   Total Score PHQ 2 0       Learning Assessment:  Learning Assessment 10/17/2017   PRIMARY LEARNER Patient   HIGHEST LEVEL OF EDUCATION - PRIMARY LEARNER  -   BARRIERS PRIMARY LEARNER NONE   CO-LEARNER CAREGIVER No   PRIMARY LANGUAGE ENGLISH   LEARNER PREFERENCE PRIMARY OTHER (COMMENT)   ANSWERED BY patient   RELATIONSHIP SELF       Abuse Screening:  Abuse Screening Questionnaire 7/29/2019   Do you ever feel afraid of your partner? N   Are you in a relationship with someone who physically or mentally threatens you? N   Is it safe for you to go home? Y       Fall Risk  Fall Risk Assessment, last 12 mths 7/29/2019   Able to walk? Yes   Fall in past 12 months? No         Health Maintenance Due   Topic Date Due    Shingrix Vaccine Age 49> (1 of 2) 12/19/1995    Pneumococcal 65+ years (2 of 2 - PPSV23) 12/20/2016    GLAUCOMA SCREENING Q2Y  09/15/2018       Coordination of Care:  1. Have you been to the ER, urgent care clinic since your last visit? Hospitalized since your last visit? no    2. Have you seen or consulted any other health care providers outside of the [address] [place] [address] since your last visit? Include any pap smears or colon screening.  no

## 2020-01-20 DIAGNOSIS — E87.6 HYPOKALEMIA: ICD-10-CM

## 2020-01-20 DIAGNOSIS — E78.5 DYSLIPIDEMIA: ICD-10-CM

## 2020-01-20 DIAGNOSIS — I10 ESSENTIAL HYPERTENSION: ICD-10-CM

## 2020-01-20 DIAGNOSIS — G47.00 INSOMNIA, UNSPECIFIED TYPE: ICD-10-CM

## 2020-01-20 DIAGNOSIS — F43.21 GRIEF REACTION: ICD-10-CM

## 2020-01-20 RX ORDER — TRIAMTERENE AND HYDROCHLOROTHIAZIDE 75; 50 MG/1; MG/1
1 TABLET ORAL DAILY
Qty: 90 TAB | Refills: 3 | Status: SHIPPED | OUTPATIENT
Start: 2020-01-20 | End: 2021-09-21 | Stop reason: SDUPTHER

## 2020-01-20 RX ORDER — POTASSIUM CHLORIDE 750 MG/1
10 TABLET, EXTENDED RELEASE ORAL DAILY
Qty: 90 TAB | Refills: 3 | Status: SHIPPED | OUTPATIENT
Start: 2020-01-20 | End: 2020-12-28 | Stop reason: DRUGHIGH

## 2020-01-20 RX ORDER — TRAZODONE HYDROCHLORIDE 50 MG/1
50 TABLET ORAL
Qty: 90 TAB | Refills: 3 | Status: SHIPPED | OUTPATIENT
Start: 2020-01-20 | End: 2021-09-21 | Stop reason: SDUPTHER

## 2020-01-20 RX ORDER — DIAZEPAM 5 MG/1
5 TABLET ORAL
Qty: 30 TAB | Refills: 0 | Status: SHIPPED | OUTPATIENT
Start: 2020-01-20 | End: 2020-06-08 | Stop reason: SDUPTHER

## 2020-01-20 RX ORDER — PRAVASTATIN SODIUM 20 MG/1
20 TABLET ORAL
Qty: 90 TAB | Refills: 3 | Status: SHIPPED | OUTPATIENT
Start: 2020-01-20 | End: 2021-09-21 | Stop reason: SDUPTHER

## 2020-01-20 NOTE — TELEPHONE ENCOUNTER
Previous scripts were sent to [address] [place] [address] is now requesting refills    [facility] Val[facility] as [facility]5/19 for a 15 d/s. Last Visit: 12/5/19 with MD Yaneth García  Next Appointment: 6/10/20 with MD George Guadalupe    Requested Prescriptions     Pending Prescriptions Disp Refills    diazePAM (VALIUM) 5 mg tablet 30 Tab 0     Sig: Take 1 Tab by mouth every twelve (12) hours as needed for Anxiety. Max Daily Amount: 10 mg.  potassium chloride (KLOR-CON) 10 mEq tablet 90 Tab 3     Sig: Take 1 Tab by mouth daily.  pravastatin (PRAVACHOL) 20 mg tablet 90 Tab 3     Sig: Take 1 Tab by mouth nightly.  traZODone (DESYREL) 50 mg tablet 90 Tab 3     Sig: Take 1 Tab by mouth nightly as needed for Sleep.  triamterene-hydroCHLOROthiazide (MAXZIDE) 75-50 mg per tablet 90 Tab 3     Sig: Take 1 Tab by mouth daily.

## 2020-01-21 ENCOUNTER — OFFICE VISIT (OUTPATIENT)
Dept: ORTHOPEDIC SURGERY | Age: 75
End: 2020-01-21

## 2020-01-21 VITALS
RESPIRATION RATE: 18 BRPM | BODY MASS INDEX: 34.49 KG/M2 | WEIGHT: 207 LBS | SYSTOLIC BLOOD PRESSURE: 129 MMHG | TEMPERATURE: 96.8 F | OXYGEN SATURATION: 98 % | DIASTOLIC BLOOD PRESSURE: 70 MMHG | HEIGHT: 65 IN | HEART RATE: 64 BPM

## 2020-01-21 DIAGNOSIS — M25.511 RIGHT SHOULDER PAIN, UNSPECIFIED CHRONICITY: ICD-10-CM

## 2020-01-21 DIAGNOSIS — M19.011 GLENOHUMERAL ARTHRITIS, RIGHT: Primary | ICD-10-CM

## 2020-01-21 RX ORDER — TRIAMCINOLONE ACETONIDE 40 MG/ML
40 INJECTION, SUSPENSION INTRA-ARTICULAR; INTRAMUSCULAR ONCE
Qty: 1 ML | Refills: 0
Start: 2020-01-21 | End: 2020-01-21

## 2020-01-21 NOTE — PROGRESS NOTES
Jona Osborne  1945   Chief Complaint   Patient presents with    Shoulder Pain     RIGHT SHOULDER PAIN        HISTORY OF PRESENT ILLNESS  Jona Osborne is a 76 y.o. female who presents today for evaluation of right shoulder pain. She rates her pain 0/10 today. Pain has been present since around the Fall 2019. Pt went to her PCP and was told she had bursitis. She reports pain at night and trouble with sleeping. Pt denies any specific injury. Patient describes the pain as aching that is Intermittent in nature. Symptoms are worse with certain movements of the amr, Activity and is better with  Rest. Associated symptoms include nothing. Since problem started, it: is unchanged. Pain does wake patient up at night. Has taken no meds for the problem. Has tried following treatments: Injections:NO; Brace:NO;  Therapy:NO; Cane/Crutch:NO       Allergies   Allergen Reactions    Codeine Itching     \"Loopy\"        Past Medical History:   Diagnosis Date    Allergic rhinitis     Anemia     chronic    Cataract     Chronic constipation     DJD (degenerative joint disease) of knee     Dr. Jesica Vigil Dyslipidemia     10 year calculated risk score was 10.3% (7/13); 11.3% (10/14); 15.2% (3/16)    FHx: heart disease     Fibrocystic breast     neg bx x2 1970s    GERD (gastroesophageal reflux disease)     HTN (hypertension)     IFG (impaired fasting glucose) 2/14    Morbid obesity (HCC)     peak weight 230 lbs, bmi 38.8 from 10/11; start weight 209 lbs but not doing; W - 7/19    Multinodular goiter 2007    negative biopsy Dr. Theo Garrison      Social History     Socioeconomic History    Marital status:      Spouse name: Not on file    Number of children: Not on file    Years of education: Not on file    Highest education level: Not on file   Occupational History    Occupation: retired principal   Social Needs    Financial resource strain: Not on file    Food insecurity:     Worry: Not on file Inability: Not on file    Transportation needs:     Medical: Not on file     Non-medical: Not on file   Tobacco Use    Smoking status: Never Smoker    Smokeless tobacco: Never Used   Substance and Sexual Activity    Alcohol use: No    Drug use: No    Sexual activity: Not on file   Lifestyle    Physical activity:     Days per week: Not on file     Minutes per session: Not on file    Stress: Not on file   Relationships    Social connections:     Talks on phone: Not on file     Gets together: Not on file     Attends Zoroastrian service: Not on file     Active member of club or organization: Not on file     Attends meetings of clubs or organizations: Not on file     Relationship status: Not on file    Intimate partner violence:     Fear of current or ex partner: Not on file     Emotionally abused: Not on file     Physically abused: Not on file     Forced sexual activity: Not on file   Other Topics Concern    Not on file   Social History Narrative    Not on file      Past Surgical History:   Procedure Laterality Date    DEXA BONE DENSITY STUDY AXIAL      DEXA t score 0.2 spine, -0.2 hip (9/09); -0.1 spine, -0.2 hip (3/14)    HX APPENDECTOMY      HX BREAST BIOPSY      1967 (RT)/ 1971 (LT)   [facility] [place]      Dr. Carol Mendes negative 2002, Dr. Amauri Thomas negative 4/3/12    HX DILATION AND CURETTAGE      x2     HX HEMORRHOIDECTOMY      HX HYSTERECTOMY      due to  [address]    HX PELVIC LAPAROSCOPY        Family History   Problem Relation Age of Onset    Hypertension Mother     Hypertension Father     Cancer Father         pancreatic    Hypertension Brother     Cancer Brother 46        lung    Hypertension Sister     Diabetes Brother     Heart Disease Sister       Current Outpatient Medications   Medication Sig    triamcinolone acetonide (KENALOG) 40 mg/mL injection 1 mL by IntraMUSCular route once for 1 dose.     diazePAM (VALIUM) 5 mg tablet Take 1 Tab by mouth every twelve (12) hours as needed for Anxiety. Max Daily Amount: 10 mg.  potassium chloride (KLOR-CON) 10 mEq tablet Take 1 Tab by mouth daily.  pravastatin (PRAVACHOL) 20 mg tablet Take 1 Tab by mouth nightly.  traZODone (DESYREL) 50 mg tablet Take 1 Tab by mouth nightly as needed for Sleep.  triamterene-hydroCHLOROthiazide (MAXZIDE) 75-50 mg per tablet Take 1 Tab by mouth daily.  fluticasone propionate (FLONASE) 50 mcg/actuation nasal spray USE 2 SPRAYS IN EACH NOSTRIL DAILY     No current facility-administered medications for this visit. REVIEW OF SYSTEM   Patient denies: Weight loss, Fever/Chills, HA, Visual changes, Fatigue, Chest pain, SOB, Abdominal pain, N/V/D/C, Blood in stool or urine, Edema. Pertinent positive as above in HPI. All others were negative    PHYSICAL EXAM:   Visit Vitals  /70   Pulse 64   Temp 96.8 °F (36 °C) (Oral)   Resp 18   Ht 5' 5\" (1.651 m)   Wt 207 lb (93.9 kg)   SpO2 98%   BMI 34.45 kg/m²     The patient is a well-developed, well-nourished female   in no acute distress. The patient is alert and oriented times three. The patient is alert and oriented times three. Mood and affect are normal.  LYMPHATIC: lymph nodes are not enlarged and are within normal limits  SKIN: normal in color and non tender to palpation. There are no bruises or abrasions noted. NEUROLOGICAL: Motor sensory exam is within normal limits. Reflexes are equal bilaterally.  There is normal sensation to pinprick and light touch  MUSCULOSKELETAL:  Examination Right shoulder   Skin Intact   AC joint tenderness -   Biceps tenderness -   Forward flexion/Elevation    Active abduction    Glenohumeral abduction 80   External rotation ROM 30   Internal rotation ROM 30   Apprehension -   Rosa Marias Relocation -   Jerk -   Load and Shift -   Obriens -   Speeds -   Impingement sign +   Supraspinatus/Empty Can +   External Rotation Strength -, 5/5   Lift Off/Belly Press -, 5/5   Neurovascular Intact     PROCEDURE: Right Glenohumeral Joint Injection with Ultrasound Guidance  Indication:Right Glenohumeral Joint pain/swelling    After sterile prep, 6 cc of Xylocaine and 1 cc of Kenalog were injected into the right glenohumeral joint. Ultrasound images captured using [employer] Issue Ultrasound machine and scanned into patient's chart. [facility]  OFFICE PROCEDURE PROGRESS NOTE        Chart reviewed for the following:  Harshal Costello M.D, have reviewed the History, Physical and updated the Allergic reactions for COPsync performed immediately prior to start of procedure:  Harshal Costello M.D, have performed the following reviews on Rema Gomez prior to the start of the procedure:            * Patient was identified by name and date of birth   * Agreement on procedure being performed was verified  * Risks and Benefits explained to the patient  * Procedure site verified and marked as necessary  * Patient was positioned for comfort  * Consent was signed and verified     Time: 9:45 AM     Date of procedure: 1/21/2020    Procedure performed by:  Hermes Leyva M.D    Provider assisted by: (see medication administration)    How tolerated by patient: tolerated the procedure well with no complications    Comments: none      IMAGING: XR of right shoulder obtained in the office dated 1/21/20 was reviewed and read by Dr. Kaur Siemens: Moderate to severe degenerative changes in the glenohumeral joint. IMPRESSION:      ICD-10-CM ICD-9-CM    1. Glenohumeral arthritis, right M19.011 715.91 TRIAMCINOLONE ACETONIDE INJ      triamcinolone acetonide (KENALOG) 40 mg/mL injection      US GUIDE INJ/ASP/ARTHRO LG JNT/BURSA   2. Right shoulder pain, unspecified chronicity M25.511 719.41 AMB POC XRAY, SHOULDER; COMPLETE, 2+        PLAN:  1. Pt presents today with right shoulder pain due to glenohumeral arthritis and I would like to try an injection today.   Risk factors include: htn, BMI>30  2. No ultrasound exam indicated today  3. Yes cortisone injection indicated today R GLENOHUMERAL JOINT US  4. No Physical/Occupational Therapy indicated today  5. No diagnostic test indicated today:   6. No durable medical equipment indicated today  7. No referral indicated today   8. No medications indicated today:   9. No Narcotic indicated today       RTC 3 weeks if pain continues      Scribed by Gilbert Marie) as dictated by Ina Leger MD    I, Dr. Ina Leger, confirm that all documentation is accurate.     Ina Leger M.D.   [facility]

## 2020-01-21 NOTE — PROGRESS NOTES
1. Have you been to the ER, urgent care clinic since your last visit? Hospitalized since your last visit? No    2. Have you seen or consulted any other health care providers outside of the [address] [place] [address] since your last visit? Include any pap smears or colon screening.  No

## 2020-03-27 ENCOUNTER — TELEPHONE (OUTPATIENT)
Dept: INTERNAL MEDICINE CLINIC | Age: 75
End: 2020-03-27

## 2020-03-27 NOTE — TELEPHONE ENCOUNTER
Pt wants to know if o.k. for her and her  Percy Rios to take \"Airborne\" to support their immune system.   Please advise her at [phone]

## 2020-06-08 ENCOUNTER — APPOINTMENT (OUTPATIENT)
Dept: INTERNAL MEDICINE CLINIC | Age: 75
End: 2020-06-08

## 2020-06-08 ENCOUNTER — HOSPITAL ENCOUNTER (OUTPATIENT)
Dept: LAB | Age: 75
Discharge: HOME | End: 2020-06-08
Payer: MEDICARE

## 2020-06-08 DIAGNOSIS — F43.21 GRIEF REACTION: ICD-10-CM

## 2020-06-08 DIAGNOSIS — E04.2 MULTINODULAR GOITER: ICD-10-CM

## 2020-06-08 DIAGNOSIS — R73.01 IFG (IMPAIRED FASTING GLUCOSE): ICD-10-CM

## 2020-06-08 DIAGNOSIS — E78.5 DYSLIPIDEMIA: ICD-10-CM

## 2020-06-08 LAB
CHOLEST SERPL-MCNC: 202 MG/DL
ERYTHROCYTE [DISTWIDTH] IN BLOOD BY AUTOMATED COUNT: 13.4 % (ref 11.6–14.5)
HCT VFR BLD AUTO: 36.4 % (ref 35–45)
HDLC SERPL-MCNC: 95 MG/DL (ref 40–60)
HDLC SERPL: 2.1 {RATIO} (ref 0–5)
HGB BLD-MCNC: 11.3 G/DL (ref 12–16)
LDLC SERPL CALC-MCNC: 92.4 MG/DL (ref 0–100)
LIPID PROFILE,FLP: ABNORMAL
MCH RBC QN AUTO: 28.5 PG (ref 24–34)
MCHC RBC AUTO-ENTMCNC: 31 G/DL (ref 31–37)
MCV RBC AUTO: 91.9 FL (ref 74–97)
PLATELET # BLD AUTO: 258 K/UL (ref 135–420)
PMV BLD AUTO: 10.9 FL (ref 9.2–11.8)
RBC # BLD AUTO: 3.96 M/UL (ref 4.2–5.3)
TRIGL SERPL-MCNC: 73 MG/DL (ref ?–150)
TSH SERPL DL<=0.05 MIU/L-ACNC: 1.18 UIU/ML (ref 0.36–3.74)
VLDLC SERPL CALC-MCNC: 14.6 MG/DL
WBC # BLD AUTO: 6.7 K/UL (ref 4.6–13.2)

## 2020-06-08 PROCEDURE — 80061 LIPID PANEL: CPT

## 2020-06-08 PROCEDURE — 36415 COLL VENOUS BLD VENIPUNCTURE: CPT

## 2020-06-08 PROCEDURE — 84443 ASSAY THYROID STIM HORMONE: CPT

## 2020-06-08 PROCEDURE — 85027 COMPLETE CBC AUTOMATED: CPT

## 2020-06-08 RX ORDER — DIAZEPAM 5 MG/1
5 TABLET ORAL
Qty: 30 TAB | Refills: 0 | Status: SHIPPED | OUTPATIENT
Start: 2020-06-08 | End: 2021-02-10 | Stop reason: SDUPTHER

## 2020-06-08 NOTE — TELEPHONE ENCOUNTER
[facility] reports the last fill date for Valium as 12/5/19 for a 15 d/s. Last Visit: 12/5/19 with MD Erika Randall  Next Appointment: 6/12/20 with MD Erika Randall  Previous Refill Encounter(s): 1/20/20 #30    Requested Prescriptions     Pending Prescriptions Disp Refills    diazePAM (VALIUM) 5 mg tablet 30 Tab 0     Sig: Take 1 Tab by mouth every twelve (12) hours as needed for Anxiety. Max Daily Amount: 10 mg.

## 2020-06-12 ENCOUNTER — VIRTUAL VISIT (OUTPATIENT)
Dept: INTERNAL MEDICINE CLINIC | Age: 75
End: 2020-06-12

## 2020-06-12 ENCOUNTER — TELEPHONE (OUTPATIENT)
Dept: INTERNAL MEDICINE CLINIC | Age: 75
End: 2020-06-12

## 2020-06-12 DIAGNOSIS — R73.01 IFG (IMPAIRED FASTING GLUCOSE): ICD-10-CM

## 2020-06-12 DIAGNOSIS — M17.0 OSTEOARTHRITIS OF BOTH KNEES, UNSPECIFIED OSTEOARTHRITIS TYPE: ICD-10-CM

## 2020-06-12 DIAGNOSIS — K21.9 GERD WITHOUT ESOPHAGITIS: ICD-10-CM

## 2020-06-12 DIAGNOSIS — I10 ESSENTIAL HYPERTENSION: Primary | ICD-10-CM

## 2020-06-12 DIAGNOSIS — E78.5 DYSLIPIDEMIA: ICD-10-CM

## 2020-06-12 DIAGNOSIS — E04.2 MULTINODULAR GOITER: ICD-10-CM

## 2020-10-06 ENCOUNTER — TRANSCRIBE ORDER (OUTPATIENT)
Dept: SCHEDULING | Age: 75
End: 2020-10-06

## 2020-10-06 DIAGNOSIS — Z12.31 VISIT FOR SCREENING MAMMOGRAM: Primary | ICD-10-CM

## 2020-12-04 ENCOUNTER — HOSPITAL ENCOUNTER (OUTPATIENT)
Dept: MAMMOGRAPHY | Age: 75
Discharge: HOME | End: 2020-12-04
Attending: OBSTETRICS & GYNECOLOGY
Payer: MEDICARE

## 2020-12-04 DIAGNOSIS — Z12.31 VISIT FOR SCREENING MAMMOGRAM: ICD-10-CM

## 2020-12-04 PROCEDURE — 77063 BREAST TOMOSYNTHESIS BI: CPT

## 2020-12-21 ENCOUNTER — HOSPITAL ENCOUNTER (OUTPATIENT)
Dept: LAB | Age: 75
Discharge: HOME | End: 2020-12-21
Payer: MEDICARE

## 2020-12-21 ENCOUNTER — APPOINTMENT (OUTPATIENT)
Dept: INTERNAL MEDICINE CLINIC | Age: 75
End: 2020-12-21

## 2020-12-21 DIAGNOSIS — R73.01 IFG (IMPAIRED FASTING GLUCOSE): ICD-10-CM

## 2020-12-21 LAB
ALBUMIN SERPL-MCNC: 3.3 G/DL (ref 3.4–5)
ALBUMIN/GLOB SERPL: 1 {RATIO} (ref 0.8–1.7)
ALP SERPL-CCNC: 93 U/L (ref 45–117)
ALT SERPL-CCNC: 13 U/L (ref 13–56)
ANION GAP SERPL CALC-SCNC: 7 MMOL/L (ref 3–18)
AST SERPL-CCNC: 11 U/L (ref 10–38)
BILIRUB SERPL-MCNC: 0.4 MG/DL (ref 0.2–1)
BUN SERPL-MCNC: 18 MG/DL (ref 7–18)
BUN/CREAT SERPL: 20 (ref 12–20)
CALCIUM SERPL-MCNC: 9.3 MG/DL (ref 8.5–10.1)
CHLORIDE SERPL-SCNC: 108 MMOL/L (ref 100–111)
CO2 SERPL-SCNC: 29 MMOL/L (ref 21–32)
CREAT SERPL-MCNC: 0.89 MG/DL (ref 0.6–1.3)
GLOBULIN SER CALC-MCNC: 3.4 G/DL (ref 2–4)
GLUCOSE SERPL-MCNC: 118 MG/DL (ref 74–99)
HBA1C MFR BLD: 5.9 % (ref 4.2–5.6)
POTASSIUM SERPL-SCNC: 3.3 MMOL/L (ref 3.5–5.5)
PROT SERPL-MCNC: 6.7 G/DL (ref 6.4–8.2)
SODIUM SERPL-SCNC: 144 MMOL/L (ref 136–145)

## 2020-12-21 PROCEDURE — 36415 COLL VENOUS BLD VENIPUNCTURE: CPT

## 2020-12-21 PROCEDURE — 80053 COMPREHEN METABOLIC PANEL: CPT

## 2020-12-21 PROCEDURE — 83036 HEMOGLOBIN GLYCOSYLATED A1C: CPT

## 2020-12-24 NOTE — PROGRESS NOTES
76 y.o. BLACK OR  female who presents for evaluation. She seems to be doing okay. She's trying to do some exercises at home on the bike and toning machines and not interested in going back to the [facility] until the end of the pandemic  No cardiovascular complaints. She has noted some cramping int he toes mainly, k is low as below despite k supp    No GI or  complaints     No sx referable to the thyroid    Denies polyuria, polydipsia, nocturia, vision change. Weight is up as they've been eating more     She also notes pain in the left hangstring tendon insertion point in the left lateral calf.   Takes ibuprofen and it helps    LAST MEDICARE WELLNESS EXAM: 3/5/14, 8/27/15, 9/20/16, 10/17/17, 10/29/18, 12/5/19, 12/28/20    Past Medical History:   Diagnosis Date    Allergic rhinitis     Anemia     chronic    Cataract     Chronic constipation     DJD (degenerative joint disease) of knee     Dr. Tapia [facility] Dyslipidemia     10 year calculated risk score was 10.3% (7/13); 11.3% (10/14); 15.2% (3/16)    FHx: heart disease     Fibrocystic breast     neg bx x2 1970s    GERD (gastroesophageal reflux disease)     HTN (hypertension)     IFG (impaired fasting glucose) 2/14    Morbid obesity (HCC)     peak weight 230 lbs, bmi 38.8 from 10/11; start weight 209 lbs but not doing; W - 7/19    Multinodular goiter 2007    negative biopsy Dr. Lourdes Irwin     Past Surgical History:   Procedure Laterality Date    DEXA BONE DENSITY STUDY AXIAL      DEXA t score 0.2 spine, -0.2 hip (9/09); -0.1 spine, -0.2 hip (3/14)    HX APPENDECTOMY      HX BREAST BIOPSY      1967 (RT)/ 1971 (LT)   Omar Raza negative 2002, Dr. Bina Alston negative 4/3/12    HX DILATION AND CURETTAGE      x2     HX HEMORRHOIDECTOMY      HX HYSTERECTOMY      due to  [address]    HX PELVIC LAPAROSCOPY       Allergies   Allergen Reactions    Codeine Itching     \"Loopy\"     Current Outpatient Medications   Medication Sig    potassium chloride (K-DUR, KLOR-CON) 20 mEq tablet Take 1 Tab by mouth two (2) times a day.  diazePAM (VALIUM) 5 mg tablet Take 1 Tab by mouth every twelve (12) hours as needed for Anxiety. Max Daily Amount: 10 mg.  pravastatin (PRAVACHOL) 20 mg tablet Take 1 Tab by mouth nightly.  traZODone (DESYREL) 50 mg tablet Take 1 Tab by mouth nightly as needed for Sleep.  triamterene-hydroCHLOROthiazide (MAXZIDE) 75-50 mg per tablet Take 1 Tab by mouth daily.  fluticasone propionate (FLONASE) 50 mcg/actuation nasal spray USE 2 SPRAYS IN EACH NOSTRIL DAILY     No current facility-administered medications for this visit. REVIEW OF SYSTEMS: gyn Dr Amy Kelsey 3/14, mammo 10/19, DEXA 3/14, colo 4/12 Dr Debra Calzada, sees Dr Zeb Her no vision change or eye pain  Oral  no mouth pain, tongue or tooth problems  Ears  no hearing loss, ear pain, fullness, no swallowing problems  Cardiac  no CP, PND, orthopnea, edema, palpitations or syncope  Chest  no breast masses  Resp  no wheezing, chronic coughing, dyspnea  GI  no heartburn, nausea, vomiting, change in bowel habits, bleeding, hemorrhoids  Urinary  no dysuria, hematuria, flank pain, urgency, frequency    Visit Vitals  /76   Pulse 80   Temp 97 °F (36.1 °C) (Temporal)   Resp 14   Ht 5' 5\" (1.651 m)   Wt 214 lb (97.1 kg)   SpO2 100%   BMI 35.61 kg/m²     A&O x3  Affect is appropriate. Mood stable  No apparent distress  Anicteric, no JVD, adenopathy or thyromegaly. No carotid bruits or radiated murmur  Lungs clear to auscultation, no wheezes or rales  Heart showed regular rate and rhythm. No murmur, rubs, gallops  Abdomen soft nontender, no hepatosplenomegaly or masses. Extremities without edema. Pulses 1-2+ symmetrically.   mild tenderness on p[alp of the left lateral hamstring tendon distally    LABS  From 12/08                                      ua neg  From 7/09 showed   gluc 98,   cr 0.90,     alt 10,          chol 223, tg 75, hdl 71, ldl-c 137  From 12/11 showed               ldl-p 1525, chol 247, tg 61, hdl 78, ldl-c 157, wbc 7.6, hb 11.7, plt 265  From 6/12 showed                   ldl-p 1177, chol 238, tg 48, hdl 78, ldl-c 152,           tsh 1.81  From 1/13 showed   gluc 99,   cr 0.95, gfr 72,  alt<5,  ldl-p 1238, chol 206, tg 56, hdl 75, ldl-c 120, wbc 8.2, hb 11.4, plt 259  From 7/13 showed               chol 221, tg 72, hdl 72, ldl-c 135  From 2/14 showed   gluc 101, cr 0.78, gfr 90,                   tsh 2.04  From 10/14 showed gluc 94,   cr 1.02, gfr>60, alt 5,   hba1c 6.2, chol 231, tg 73, hdl 83, ldl-c 133, wbc 7.9, hb 11.9, plt 281  From 3/16 showed   gluc 107, cr 0.75, gfr>60, alt 11, hba1c 5.2, chol 229, tg 74, hdl 90, ldl-c 124, wbc 7.6, hb 12.0, plt 256, tsh 1.22, ua neg  From 9/16 showed   gluc 105, cr 0.89, gfr>60, alt 14,         chol 198, tg 54, hdl 93, ldl-c 94  From 4/17 showed               chol 204, tg 69, hdl 83, ldl-c 107, wbc 8.5, hb 11.5, plt 247, tsh 0.73  From 10/17 showed gluc 107, cr 0.90, gfr>60, alt 14, hba1c 5.9, chol 216, tg 68, hdl 99, ldl-c 103, wbc 6.8, hb 11.7, plt 262  From 4/18 showed   gluc 110, cr 0.81, gfr>60,    hba1c 6.1,                tsh 0.93, ft4 1.30  From 10/18 showed gluc 111, cr 0.87, gfr>60,    hba1c 6.0, chol 201, tg 72, hdl 94, ldl-c 93  From 7/19 showed   gluc 110, cr 0.94, gfr 58,  alt 11, hba1c 6.5, chol 203, tg 77, hdl 85, ldl-c 103, wbc 6.9, hb 11.6, plt 249, tsh 1.48, ft4 1.30  From 11/19 showed gluc 104, cr 0.99, gfr 55,    hba1c 6.0,                umar 5  From 6/20 showed               chol 202, tg 73, hdl 95, ldl-c 92,   wbc 6.7, hb 11.3, plt 258, tsh 1.18    Results for orders placed or performed during the hospital encounter of 14/78/94   METABOLIC PANEL, COMPREHENSIVE   Result Value Ref Range    Sodium 144 136 - 145 mmol/L    Potassium 3.3 (L) 3.5 - 5.5 mmol/L    Chloride 108 100 - 111 mmol/L    CO2 29 21 - 32 mmol/L    Anion gap 7 3.0 - 18 mmol/L    Glucose 118 (H) 74 - 99 mg/dL    BUN 18 7.0 - 18 MG/DL    Creatinine 0.89 0.6 - 1.3 MG/DL    BUN/Creatinine ratio 20 12 - 20      GFR est AA >60 >60 ml/min/1.73m2    GFR est non-AA >60 >60 ml/min/1.73m2    Calcium 9.3 8.5 - 10.1 MG/DL    Bilirubin, total 0.4 0.2 - 1.0 MG/DL    ALT (SGPT) 13 13 - 56 U/L    AST (SGOT) 11 10 - 38 U/L    Alk. phosphatase 93 45 - 117 U/L    Protein, total 6.7 6.4 - 8.2 g/dL    Albumin 3.3 (L) 3.4 - 5.0 g/dL    Globulin 3.4 2.0 - 4.0 g/dL    A-G Ratio 1.0 0.8 - 1.7     HEMOGLOBIN A1C W/O EAG   Result Value Ref Range    Hemoglobin A1c 5.9 (H) 4.2 - 5.6 %     Patient Active Problem List   Diagnosis Code    Multinodular goiter neg biopsy 2007 Dr. Jessy Pastor E04.2    Dyslipidemia E78.5    IFG (impaired fasting glucose) R73.01    Essential hypertension I10    Degenerative arthritis of knee, bilateral M17.0    GERD without esophagitis K21.9    Advance directive in chart Z78.9    Obesity (BMI 30-39. 9) E66.9    BMI 33.0-33.9,adult Z68.33     ASSESSMENT AND PLAN:   1. Hypertension. Continue current regimen although we did discuss changing to acei/diuretic combo; instead, she will inc the kdur to 20 and call w update  2. Obesity. See separate discussion  3. Allergic rhinitis. Prn antihistamines and flonase  4. Fibrocystic breasts. F/U GYN and mammos  5. DJD. Prn nsaids  6. General.  Ca/D advocated. 7.  Thyroid. Check next draw  8. Dyslipidemia. Continue current regimen. 9.  Possible early DM. Continue current  10. Hamstring strain. Prn nsaid, rest      RTC 6/21    Above conditions discussed at length and patient vocalized understanding.   All questions answered to patient satisfaction

## 2020-12-24 NOTE — PROGRESS NOTES
This is a Subsequent Medicare Annual Wellness Visit     I have reviewed the patient's medical history in detail and updated the computerized patient record. History     Past Medical History:   Diagnosis Date    Allergic rhinitis     Anemia     chronic    Cataract     Chronic constipation     DJD (degenerative joint disease) of knee     Dr. Shell Pierre Dyslipidemia     10 year calculated risk score was 10.3% (7/13); 11.3% (10/14); 15.2% (3/16)    FHx: heart disease     Fibrocystic breast     neg bx x2 1970s    GERD (gastroesophageal reflux disease)     HTN (hypertension)     IFG (impaired fasting glucose) 2/14    Morbid obesity (HCC)     peak weight 230 lbs, bmi 38.8 from 10/11; start weight 209 lbs but not doing; W - 7/19    Multinodular goiter 2007    negative biopsy Dr. Nikolas Hawkins      Past Surgical History:   Procedure Laterality Date    DEXA BONE DENSITY STUDY AXIAL      DEXA t score 0.2 spine, -0.2 hip (9/09); -0.1 spine, -0.2 hip (3/14)    HX APPENDECTOMY      HX BREAST BIOPSY      1967 (RT)/ 1971 (LT)   [place]is [place]jas negative 2002, Dr. Zari Sutherland negative 4/3/12    HX DILATION AND CURETTAGE      x2     HX HEMORRHOIDECTOMY      HX HYSTERECTOMY      due to  leimyomata    HX PELVIC LAPAROSCOPY       Current Outpatient Medications   Medication Sig Dispense Refill    potassium chloride (K-DUR, KLOR-CON) 20 mEq tablet Take 1 Tab by mouth two (2) times a day. 90 Tab 3    diazePAM (VALIUM) 5 mg tablet Take 1 Tab by mouth every twelve (12) hours as needed for Anxiety. Max Daily Amount: 10 mg. 30 Tab 0    pravastatin (PRAVACHOL) 20 mg tablet Take 1 Tab by mouth nightly. 90 Tab 3    traZODone (DESYREL) 50 mg tablet Take 1 Tab by mouth nightly as needed for Sleep. 90 Tab 3    triamterene-hydroCHLOROthiazide (MAXZIDE) 75-50 mg per tablet Take 1 Tab by mouth daily.  90 Tab 3    fluticasone propionate (FLONASE) 50 mcg/actuation nasal spray USE 2 SPRAYS IN EACH NOSTRIL DAILY 48 g 3 Allergies   Allergen Reactions    Codeine Itching     \"Loopy\"     Family History   Problem Relation Age of Onset    Hypertension Mother     Hypertension Father     Cancer Father         pancreatic    Hypertension Brother     Cancer Brother 46        lung    Hypertension Sister     Diabetes Brother     Heart Disease Sister      Social History     Tobacco Use    Smoking status: Never Smoker    Smokeless tobacco: Never Used   Substance Use Topics    Alcohol use: No     Depression Risk Factor Screening:     3 most recent PHQ Screens 1/21/2020   Little interest or pleasure in doing things Not at all   Feeling down, depressed, irritable, or hopeless Not at all   Total Score PHQ 2 0     SCREENINGS  Colonoscopy last done 2012 Dr Denilson Kyle last done 10/18  DEXA last done 3/14  Gyn last done >5 yrs    Immunization History   Administered Date(s) Administered    (RETIRED) Pneumococcal Vaccine (Unspecified Type) 12/20/2011    H1N1 Influenza Virus Vaccine 12/01/2009    Influenza High Dose Vaccine PF 11/19/2013, 09/20/2016, 10/17/2017, 09/24/2019, 10/10/2020    Influenza Vaccine 10/31/2014, 11/19/2015    Influenza Vaccine Whole 09/20/2011    PPD 09/24/2012    Pneumococcal Conjugate (PCV-13) 08/27/2015    TD Vaccine 01/01/1999    Tdap 10/01/2015    Zoster 12/20/2011     Alcohol Risk Factor Screening: On any occasion during the past 3 months, have you had more than 3 drinks containing alcohol? No    Do you average more than 7 drinks per week? No      Functional Ability and Level of Safety:     Hearing Loss   normal-to-mild    Vision - no acute complaints and is followed by Dr Opal Santiago [facility]   Self-care. Requires assistance with: no ADLs    Fall Risk     Fall Risk Assessment, last 12 mths 1/21/2020   Able to walk? Yes   Fall in past 12 months?  No     Abuse Screen   Patient is not abused    Review of Systems   A comprehensive review of systems was negative except for that written in the HPI. Physical Examination     Evaluation of Cognitive Function:  Mood/affect:  neutral  Appearance: age appropriate, overweight, well dressed and within normal Limits  Family member/caregiver input: na    Patient Care Team:  Ashvin Rodriguez MD as PCP - General (Internal Medicine)  Ashvin Rodriguez MD as PCP - [facility] Empaneled Provider    Advice/Referrals/Counseling   Education and counseling provided:  Are appropriate based on today's review and evaluation  End-of-Life planning (with patient's consent)  Pneumococcal Vaccine  Influenza Vaccine  Screening Mammography  Colorectal cancer screening tests  Cardiovascular screening blood test  Bone mass measurement (DEXA)  Diabetes screening test    Assessment/Plan       ICD-10-CM ICD-9-CM    1. Medicare annual wellness visit, subsequent  Z00.00 V70.0    2. Essential hypertension  I10 401.9    3. GERD without esophagitis  K21.9 530.81 CBC W/O DIFF   4. IFG (impaired fasting glucose)  F89.08 299.74 METABOLIC PANEL, BASIC      HEMOGLOBIN A1C W/O EAG   5. Multinodular goiter neg biopsy 2007 Dr. Jaida Patel  E04.2 241.1 TSH 3RD GENERATION   6. Osteoarthritis of both knees, unspecified osteoarthritis type  M17.0 715.96    7. Dyslipidemia  E78.5 272.4 LIPID PANEL      TSH 3RD GENERATION   8. BMI 33.0-33.9,adult  Z68.33 V85.33 AL BEHAVIOR  OBESITY 15M   9. Advanced directives, counseling/discussion  Z71.89 V65.49    10. Hypokalemia  E87.6 276.8 potassium chloride (K-DUR, KLOR-CON) 20 mEq tablet     current treatment plan is effective, no change in therapy  lab results and schedule of future lab studies reviewed with patient  reviewed diet, exercise and weight control  cardiovascular risk and specific lipid/LDL goals reviewed. End of life discussion undertaken.   Will work on amd  Flu high dose declined  shingrix advocated  Colonoscopy to be scheduled 2022  Mammo to be scheduled 2021  DEXA deferred

## 2020-12-28 ENCOUNTER — OFFICE VISIT (OUTPATIENT)
Dept: INTERNAL MEDICINE CLINIC | Age: 75
End: 2020-12-28
Payer: MEDICARE

## 2020-12-28 VITALS
RESPIRATION RATE: 14 BRPM | TEMPERATURE: 97 F | OXYGEN SATURATION: 100 % | HEIGHT: 65 IN | SYSTOLIC BLOOD PRESSURE: 131 MMHG | WEIGHT: 214 LBS | DIASTOLIC BLOOD PRESSURE: 76 MMHG | HEART RATE: 80 BPM | BODY MASS INDEX: 35.65 KG/M2

## 2020-12-28 DIAGNOSIS — Z00.00 MEDICARE ANNUAL WELLNESS VISIT, SUBSEQUENT: Primary | ICD-10-CM

## 2020-12-28 DIAGNOSIS — E78.5 DYSLIPIDEMIA: ICD-10-CM

## 2020-12-28 DIAGNOSIS — E04.2 MULTINODULAR GOITER: ICD-10-CM

## 2020-12-28 DIAGNOSIS — K21.9 GERD WITHOUT ESOPHAGITIS: ICD-10-CM

## 2020-12-28 DIAGNOSIS — I10 ESSENTIAL HYPERTENSION: ICD-10-CM

## 2020-12-28 DIAGNOSIS — M17.0 OSTEOARTHRITIS OF BOTH KNEES, UNSPECIFIED OSTEOARTHRITIS TYPE: ICD-10-CM

## 2020-12-28 DIAGNOSIS — R73.01 IFG (IMPAIRED FASTING GLUCOSE): ICD-10-CM

## 2020-12-28 DIAGNOSIS — E87.6 HYPOKALEMIA: ICD-10-CM

## 2020-12-28 DIAGNOSIS — Z71.89 ADVANCED DIRECTIVES, COUNSELING/DISCUSSION: ICD-10-CM

## 2020-12-28 PROCEDURE — G0439 PPPS, SUBSEQ VISIT: HCPCS | Performed by: INTERNAL MEDICINE

## 2020-12-28 PROCEDURE — G0463 HOSPITAL OUTPT CLINIC VISIT: HCPCS | Performed by: INTERNAL MEDICINE

## 2020-12-28 PROCEDURE — G8754 DIAS BP LESS 90: HCPCS | Performed by: INTERNAL MEDICINE

## 2020-12-28 PROCEDURE — G8752 SYS BP LESS 140: HCPCS | Performed by: INTERNAL MEDICINE

## 2020-12-28 PROCEDURE — G8432 DEP SCR NOT DOC, RNG: HCPCS | Performed by: INTERNAL MEDICINE

## 2020-12-28 PROCEDURE — G8417 CALC BMI ABV UP PARAM F/U: HCPCS | Performed by: INTERNAL MEDICINE

## 2020-12-28 PROCEDURE — 1090F PRES/ABSN URINE INCON ASSESS: CPT | Performed by: INTERNAL MEDICINE

## 2020-12-28 PROCEDURE — G8399 PT W/DXA RESULTS DOCUMENT: HCPCS | Performed by: INTERNAL MEDICINE

## 2020-12-28 PROCEDURE — 99214 OFFICE O/P EST MOD 30 MIN: CPT | Performed by: INTERNAL MEDICINE

## 2020-12-28 PROCEDURE — G0447 BEHAVIOR COUNSEL OBESITY 15M: HCPCS | Performed by: INTERNAL MEDICINE

## 2020-12-28 PROCEDURE — 1101F PT FALLS ASSESS-DOCD LE1/YR: CPT | Performed by: INTERNAL MEDICINE

## 2020-12-28 PROCEDURE — 99497 ADVNCD CARE PLAN 30 MIN: CPT | Performed by: INTERNAL MEDICINE

## 2020-12-28 PROCEDURE — G8427 DOCREV CUR MEDS BY ELIG CLIN: HCPCS | Performed by: INTERNAL MEDICINE

## 2020-12-28 PROCEDURE — 3017F COLORECTAL CA SCREEN DOC REV: CPT | Performed by: INTERNAL MEDICINE

## 2020-12-28 PROCEDURE — G8536 NO DOC ELDER MAL SCRN: HCPCS | Performed by: INTERNAL MEDICINE

## 2020-12-28 RX ORDER — POTASSIUM CHLORIDE 20 MEQ/1
20 TABLET, EXTENDED RELEASE ORAL 2 TIMES DAILY
Qty: 90 TAB | Refills: 3 | Status: SHIPPED | OUTPATIENT
Start: 2020-12-28 | End: 2021-06-11 | Stop reason: DRUGHIGH

## 2020-12-28 NOTE — PROGRESS NOTES
Discussion about weight loss    Body mass index is 35.61 kg/m². Vitals 12/28/2020 1/21/2020 12/5/2019   Weight 214 lb 207 lb 207 lb   SpO2 100 98 100   BSA 2.11 m2 2.08 m2 2.08 m2   BMI 35.61 kg/m2 34.45 kg/m2 34.45 kg/m2   BP comment        Discussion about modifying behaviors regarding diet and exercise undertaken    Increase activity as tolerated   - she is trying to exercise regularly at home    Cut back carbs and fatty foods.     Calorie counting would be ideal   - admits that the pandemic has made her eat more at home    Option of appetite suppressants discussed briefly and declined   - due to concerns about sfx and cost    Discussed sending to nutritionist for further teaching    - she had gone for the free classes    Intermittent fasting discussed at length, concepts explained, risks and benefits elaborated upon   - pt not able to do this    We reiterated target of up to 5% wt loss as being realistic over the next 6-12 months and possibly aiming for higher than that in the future     Will come back for reevaluation and further management at subsequent visits which will be determined by patient response    Total time 15 min

## 2020-12-28 NOTE — ACP (ADVANCE CARE PLANNING)
Advance Care Planning     General Advance Care Planning (ACP) Conversation      Date of Conversation: 12/28/2020  Conducted with: Patient with Decision Making Capacity    Healthcare Decision Maker:     Click here to complete [address] including selection of the [address] Relationship (ie \"Primary\")  Today we documented Decision Maker(s) consistent with Legal Next of Kin hierarchy. Content/Action Overview:    Has ACP document(s) NOT on file - requested patient to provide  Reviewed DNR/DNI and patient elects Full Code (Attempt Resuscitation)  Topics discussed: ventilation preferences, hospitalization preferences and resuscitation preferences  Additional Comments: none     Length of Voluntary ACP Conversation in minutes:  16 minutes    Ric Velazco MD

## 2020-12-28 NOTE — PATIENT INSTRUCTIONS
Medicare Wellness Visit, Female The best way to live healthy is to have a lifestyle where you eat a well-balanced diet, exercise regularly, limit alcohol use, and quit all forms of tobacco/nicotine, if applicable. Regular preventive services are another way to keep healthy. Preventive services (vaccines, screening tests, monitoring & exams) can help personalize your care plan, which helps you manage your own care. Screening tests can find health problems at the earliest stages, when they are easiest to treat. [facility] follows the current, evidence-based guidelines published by the [facility] Domenic Hansen ([facility]) when recommending preventive services for our patients. Because we follow these guidelines, sometimes recommendations change over time as research supports it. (For example, mammograms used to be recommended annually. Even though Medicare will still pay for an annual mammogram, the newer guidelines recommend a mammogram every two years for women of average risk). Of course, you and your doctor may decide to screen more often for some diseases, based on your risk and your co-morbidities (chronic disease you are already diagnosed with). Preventive services for you include: - Medicare offers their members a free annual wellness visit, which is time for you and your primary care provider to discuss and plan for your preventive service needs. Take advantage of this benefit every year! 
-All adults over the age of 72 should receive the recommended pneumonia vaccines. Current USPSTF guidelines recommend a series of two vaccines for the best pneumonia protection.  
-All adults should have a flu vaccine yearly and a tetanus vaccine every 10 years.  
-All adults age 48 and older should receive the shingles vaccines (series of two vaccines). -All adults age 38-68 who are overweight should have a diabetes screening test once every three years. -All adults born between [birth year] and 1965 should be screened once for Hepatitis C. 
-Other screening tests and preventive services for persons with diabetes include: an eye exam to screen for diabetic retinopathy, a kidney function test, a foot exam, and stricter control over your cholesterol.  
-Cardiovascular screening for adults with routine risk involves an electrocardiogram (ECG) at intervals determined by your doctor.  
-Colorectal cancer screenings should be done for adults age 54-65 with no increased risk factors for colorectal cancer. There are a number of acceptable methods of screening for this type of cancer. Each test has its own benefits and drawbacks. Discuss with your doctor what is most appropriate for you during your annual wellness visit. The different tests include: colonoscopy (considered the best screening method), a fecal occult blood test, a fecal DNA test, and sigmoidoscopy. 
 
-A bone mass density test is recommended when a woman turns 65 to screen for osteoporosis. This test is only recommended one time, as a screening. Some providers will use this same test as a disease monitoring tool if you already have osteoporosis. -Breast cancer screenings are recommended every other year for women of normal risk, age 54-69. 
-Cervical cancer screenings for women over age 72 are only recommended with certain risk factors. Here is a list of your current Health Maintenance items (your personalized list of preventive services) with a due date: 
Health Maintenance Due Topic Date Due  Shingles Vaccine (1 of 2) 12/19/1995  Pneumococcal Vaccine (2 of 2 - PPSV23) 12/20/2016  Glaucoma Screening   09/15/2018

## 2021-02-10 DIAGNOSIS — F43.21 GRIEF REACTION: ICD-10-CM

## 2021-02-10 NOTE — TELEPHONE ENCOUNTER
Last Visit: 12/28/2020 with MD Lula Quiles  Next Appointment: 6/28/2021 with MD Lula Quiles  Previous Refill Encounter(s): 6/8/2020 per MD Lula Quiles #30    Requested Prescriptions     Pending Prescriptions Disp Refills    diazePAM (VALIUM) 5 mg tablet 30 Tab 0     Sig: Take 1 Tab by mouth every twelve (12) hours as needed for Anxiety. Max Daily Amount: 10 mg.

## 2021-02-10 NOTE — TELEPHONE ENCOUNTER
[facility] reports the last fill date for Valium as 6/8/20 for a 15 d/s. Last Visit: 12/28/20 with MD Navdeep Maldonado  Next Appointment: 6/28/21 with MD Navdeep Maldonado  Previous Refill Encounter(s): 6/8/20 #30    Requested Prescriptions     Pending Prescriptions Disp Refills    diazePAM (VALIUM) 5 mg tablet 30 Tab 0     Sig: Take 1 Tab by mouth every twelve (12) hours as needed for Anxiety. Max Daily Amount: 10 mg.

## 2021-02-12 RX ORDER — DIAZEPAM 5 MG/1
5 TABLET ORAL
Qty: 30 TAB | Refills: 0 | Status: SHIPPED | OUTPATIENT
Start: 2021-02-12 | End: 2021-08-16

## 2021-06-09 NOTE — MR AVS SNAPSHOT
Audiology Report:    Referring Provider:  Dr. Robles Padilla    History:  Fidel Woo is seen today for comprehensive hearing evaluation. He has a history of difficulty hearing in background noise for several years. He reports he has trouble hearing conversations when there is wind and when there are many people talking in the background. Fidel states he experiences constant tinnitus bilaterally. He reports that he hears a high pitch ring in both ears as well as a low pitch hum in his left ear. He reports a positive family history of hearing loss as his father had hearing loss. 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  He denies a history of otalgia, otorrhea, aural fullness, dizziness, and ear surgery.    Results:     Left Ear Right Ear   Otoscopy clear canals clear canals   Pure Tone Audiometry normal hearing from 250-1500 Hz sloping to mild sensorineural hearing loss   normal hearing from 250-1500 Hz sloping to mild sensorineural hearing loss   Word Recognition excellent excellent   Tympanometry shallow (Type As)  shallow (Type As)     Transducer: Insert earphones    Reliability was good  and there was good  SRT to PTA agreement.       Plan:  Results are discussed in detail. It was recommended that Fidel consult an ENT due to the slight asymmetry in his hearing and the difference in his perceived tinnitus.  He should return for retesting in one year or as required for medical management.  Fidel is a candidate for amplification if he is motivated. He is counseled on hearing protection, tinnitus, and listening strategies.     Please see audiogram under  media  and  audiogram  in the patient s chart.     Johnathon Ellis., CCC-A  [place] Licensed Audiologist #[id number]             Visit Information Date & Time Provider Department Dept. Phone Encounter #  
 4/10/2017 12:00 PM Marietta Currie MD Internist of [facility] [phone] [id number] Your Appointments 10/9/2017  8:15 AM  
LAB with [facility] NURSE VISIT Internist of [facility] [place] [facility] [place] ([address]) Appt Note: ov lab fasting [address], [address] [facility] [address]  
  
   
 [address], [address]  
  
    
 10/16/2017  9:00 AM  
Office Visit with Marietta Currie MD  
Internist of [facility] [address]) Appt Note: 6 mth f/u with lab  
 [address]  
  
   
 [address], [address] Upcoming Health Maintenance Date Due  
 MEDICARE YEARLY EXAM 9/21/2017 GLAUCOMA SCREENING Q2Y 9/15/2018 BREAST CANCER SCRN MAMMOGRAM 10/24/2018 COLONOSCOPY 5/1/2022 DTaP/Tdap/Td series (2 - Td) 10/1/2025 Allergies as of 4/10/2017  Review Complete On: 4/5/2017 By: Marietta Currie MD  
  
 Severity Noted Reaction Type Reactions Codeine  06/25/2012    Itching \"Loopy\" Current Immunizations  Reviewed on 9/20/2016 Name Date H1N1 FLU VACCINE 12/1/2009 Influenza High Dose Vaccine PF 9/20/2016, 11/19/2013 Influenza Vaccine 11/19/2015, 10/31/2014 Influenza Vaccine Whole 9/20/2011 PPD 9/24/2012 Pneumococcal Conjugate (PCV-13) 8/27/2015 Pneumococcal Vaccine (Unspecified Type) 12/20/2011 TD Vaccine 1/1/1999 Tdap 10/1/2015 Zoster 12/20/2011 Not reviewed this visit Vitals BP Pulse Temp Height(growth percentile) Weight(growth percentile) SpO2  
 126/80 91 97.9 °F (36.6 °C) (Oral) 5' 6\" (1.676 m) 216 lb (98 kg) 94% BMI OB Status Smoking Status 34.86 kg/m2 Hysterectomy Never Smoker Vitals History BMI and BSA Data Body Mass Index Body Surface Area  34.86 kg/m 2 2.14 m 2  
  
  
 Preferred Pharmacy Pharmacy Name Phone Aileen Joyner, [facility] - [address] [facility] [address] [phone] Your Updated Medication List  
  
   
This list is accurate as of: 4/10/17 12:46 PM.  Always use your most recent med list.  
  
  
  
  
 fluticasone 50 mcg/actuation nasal spray Commonly known as:  FLONASE  
USE 2 SPRAYS IN EACH NOSTRIL DAILY pravastatin 10 mg tablet Commonly known as:  PRAVACHOL Take 1 Tab by mouth nightly. triamterene-hydroCHLOROthiazide 75-50 mg per tablet Commonly known as:  Laura Lather TAKE 1 TABLET EVERY DAY Introducing [facility] SERVICES! Eleanor Gilmore introduces Hidden[employer] patient portal. Now you can access parts of your medical record, email your doctor's office, and request medication refills online. 1. In your internet browser, go to [email or web address] 2. Click on the First Time User? Click Here link in the Sign In box. You will see the New Member Sign Up page. 3. Enter your Hidden[employer] Access Code exactly as it appears below. You will not need to use this code after youve completed the sign-up process. If you do not sign up before the expiration date, you must request a new code. · [employer] Access Code: [email or web address] Expires: 7/9/2017 12:45 PM 
 
4. Enter the last four digits of your Social Security Number (xxxx) and Date of Birth (mm/dd/yyyy) as indicated and click Submit. You will be taken to the next sign-up page. 5. Create a [employer] ID. This will be your Hiddenbed login ID and cannot be changed, so think of one that is secure and easy to remember. 6. Create a Hiddenbed password. You can change your password at any time. 7. Enter your Password Reset Question and Answer. This can be used at a later time if you forget your password. 8. Enter your e-mail address. You will receive e-mail notification when new information is available in [address] 9. Click Sign Up. You can now view and download portions of your medical record. 10. Click the Download Summary menu link to download a portable copy of your medical information. If you have questions, please visit the Frequently Asked Questions section of the [employer] website. Remember, [employer] is NOT to be used for urgent needs. For medical emergencies, dial 911. Now available from your iPhone and Android! Please provide this summary of care documentation to your next provider. Your primary care clinician is listed as Mimi Headley. If you have any questions after today's visit, please call [phone].

## 2021-06-10 ENCOUNTER — TELEPHONE (OUTPATIENT)
Dept: INTERNAL MEDICINE CLINIC | Age: 76
End: 2021-06-10

## 2021-06-11 RX ORDER — POTASSIUM CHLORIDE 750 MG/1
10 TABLET, EXTENDED RELEASE ORAL DAILY
Qty: 90 TABLET | Refills: 3 | Status: SHIPPED | OUTPATIENT
Start: 2021-06-11 | End: 2021-09-21 | Stop reason: SDUPTHER

## 2021-06-21 ENCOUNTER — HOSPITAL ENCOUNTER (OUTPATIENT)
Dept: LAB | Age: 76
Discharge: HOME | End: 2021-06-21

## 2021-06-21 ENCOUNTER — APPOINTMENT (OUTPATIENT)
Dept: INTERNAL MEDICINE CLINIC | Age: 76
End: 2021-06-21

## 2021-06-21 LAB — XX-LABCORP SPECIMEN COL,LCBCF: NORMAL

## 2021-06-21 PROCEDURE — 99001 SPECIMEN HANDLING PT-LAB: CPT

## 2021-06-22 LAB
BUN SERPL-MCNC: 16 MG/DL (ref 8–27)
BUN/CREAT SERPL: 18 (ref 12–28)
CALCIUM SERPL-MCNC: 9.3 MG/DL (ref 8.7–10.3)
CHLORIDE SERPL-SCNC: 102 MMOL/L (ref 96–106)
CHOLEST SERPL-MCNC: 200 MG/DL (ref 100–199)
CO2 SERPL-SCNC: 25 MMOL/L (ref 20–29)
CREAT SERPL-MCNC: 0.91 MG/DL (ref 0.57–1)
ERYTHROCYTE [DISTWIDTH] IN BLOOD BY AUTOMATED COUNT: 13 % (ref 11.7–15.4)
GLUCOSE SERPL-MCNC: 113 MG/DL (ref 65–99)
HBA1C MFR BLD: 6.1 % (ref 4.8–5.6)
HCT VFR BLD AUTO: 36.3 % (ref 34–46.6)
HDLC SERPL-MCNC: 85 MG/DL
HGB BLD-MCNC: 12 G/DL (ref 11.1–15.9)
IMP & REVIEW OF LAB RESULTS: NORMAL
LDLC SERPL CALC-MCNC: 105 MG/DL (ref 0–99)
MCH RBC QN AUTO: 29.5 PG (ref 26.6–33)
MCHC RBC AUTO-ENTMCNC: 33.1 G/DL (ref 31.5–35.7)
MCV RBC AUTO: 89 FL (ref 79–97)
PLATELET # BLD AUTO: 233 X10E3/UL (ref 150–450)
POTASSIUM SERPL-SCNC: 3.6 MMOL/L (ref 3.5–5.2)
RBC # BLD AUTO: 4.07 X10E6/UL (ref 3.77–5.28)
SODIUM SERPL-SCNC: 144 MMOL/L (ref 134–144)
TRIGL SERPL-MCNC: 52 MG/DL (ref 0–149)
TSH SERPL DL<=0.005 MIU/L-ACNC: 1.97 UIU/ML (ref 0.45–4.5)
VLDLC SERPL CALC-MCNC: 10 MG/DL (ref 5–40)
WBC # BLD AUTO: 7.4 X10E3/UL (ref 3.4–10.8)

## 2021-06-22 NOTE — PROGRESS NOTES
76 y.o. BLACK/ female who presents for evaluation. She seems to be doing okay. She's doing exercises at home on the bike and toning machines and still not comfortable going back to the [facility]  No cardiovascular complaints. No GI or  complaints     No sx referable to the thyroid    Denies polyuria, polydipsia, nocturia, vision change. Weight stable as below. Still not comfortable venturing out much but they went to the restaurant for the 1st time this past weekend    [address]: 3/5/14, 8/27/15, 9/20/16, 10/17/17, 10/29/18, 12/5/19, 12/28/20    Past Medical History:   Diagnosis Date    Allergic rhinitis     Anemia     chronic    Cataract     Chronic constipation     Dyslipidemia     10 year calculated risk score was 10.3% (7/13); 11.3% (10/14); 15.2% (3/16)    FHx: heart disease     Fibrocystic breast     neg bx x2 1970s    GERD (gastroesophageal reflux disease)     Hypertension     IFG (impaired fasting glucose) 2/14    Morbid obesity ([address]     peak weight 230 lbs, bmi 38.8 from 10/11; start weight 209 lbs but not doing; W - 7/19    Multinodular goiter 2007    negative biopsy Dr. Sarath Banks Osteoarthritis, knee     Dr. Cayden Campos     Past Surgical History:   Procedure Laterality Date    DEXA BONE DENSITY STUDY AXIAL      DEXA t score 0.2 spine, -0.2 hip (9/09); -0.1 spine, -0.2 hip (3/14)    HX APPENDECTOMY      HX BREAST BIOPSY      1967 (RT)/ 1971 (LT)   [place] Dr. Eliberto Boast negative 2002, Dr. Patric Freeman negative 4/3/12    HX DILATION AND CURETTAGE      x2     HX HEMORRHOIDECTOMY      HX HYSTERECTOMY      due to  leimyomata    HX PELVIC LAPAROSCOPY       Allergies   Allergen Reactions    Codeine Itching     \"Loopy\"     Current Outpatient Medications   Medication Sig    potassium chloride (KLOR-CON) 10 mEq tablet Take 1 Tablet by mouth daily.  diazePAM (VALIUM) 5 mg tablet Take 1 Tab by mouth every twelve (12) hours as needed for Anxiety.  Max Daily Amount: 10 mg.  pravastatin (PRAVACHOL) 20 mg tablet Take 1 Tab by mouth nightly.  traZODone (DESYREL) 50 mg tablet Take 1 Tab by mouth nightly as needed for Sleep. (Patient taking differently: Take 50 mg by mouth nightly as needed for Sleep. PRN)    triamterene-hydroCHLOROthiazide (MAXZIDE) 75-50 mg per tablet Take 1 Tab by mouth daily.  fluticasone propionate (FLONASE) 50 mcg/actuation nasal spray USE 2 SPRAYS IN EACH NOSTRIL DAILY (Patient taking differently: USE 2 SPRAYS IN EACH NOSTRIL DAILY PRN)     No current facility-administered medications for this visit. REVIEW OF SYSTEMS: gyn Dr Debbi Minaya 3/14, mammo 12/20, DEXA 3/14, colo 4/12 Dr Jolly Speaker, sees Dr Nneka Perea no vision change or eye pain  Oral  no mouth pain, tongue or tooth problems  Ears  no hearing loss, ear pain, fullness, no swallowing problems  Cardiac  no CP, PND, orthopnea, edema, palpitations or syncope  Chest  no breast masses  Resp  no wheezing, chronic coughing, dyspnea  GI  no heartburn, nausea, vomiting, change in bowel habits, bleeding, hemorrhoids  Urinary  no dysuria, hematuria, flank pain, urgency, frequency    Visit Vitals  /66   Pulse 77   Temp 97.7 °F (36.5 °C) (Temporal)   Resp 14   Ht 5' 5\" (1.651 m)   Wt 215 lb (97.5 kg)   SpO2 99%   BMI 35.78 kg/m²     A&O x3  Affect is appropriate. Mood stable  No apparent distress  Anicteric, no JVD, adenopathy or thyromegaly. No carotid bruits or radiated murmur  Lungs clear to auscultation, no wheezes or rales  Heart showed regular rate and rhythm. No murmur, rubs, gallops  Abdomen soft nontender, no hepatosplenomegaly or masses. Extremities without edema. Pulses 1-2+ symmetrically.      LABS  From 12/08                                      ua neg  From 7/09 showed   gluc 98,   cr 0.90,     alt 10,          chol 223, tg 75, hdl 71, ldl-c 137  From 12/11 showed               ldl-p 1525, chol 247, tg 61, hdl 78, ldl-c 157, wbc 7.6, hb 11.7, plt 265  From 6/12 showed                   ldl-p 1177, chol 238, tg 48, hdl 78, ldl-c 152,           tsh 1.81  From 1/13 showed   gluc 99,   cr 0.95, gfr 72,  alt<5,  ldl-p 1238, chol 206, tg 56, hdl 75, ldl-c 120, wbc 8.2, hb 11.4, plt 259  From 7/13 showed               chol 221, tg 72, hdl 72, ldl-c 135  From 2/14 showed   gluc 101, cr 0.78, gfr 90,                   tsh 2.04  From 10/14 showed gluc 94,   cr 1.02, gfr>60, alt 5,   hba1c 6.2, chol 231, tg 73, hdl 83, ldl-c 133, wbc 7.9, hb 11.9, plt 281  From 3/16 showed   gluc 107, cr 0.75, gfr>60, alt 11, hba1c 5.2, chol 229, tg 74, hdl 90, ldl-c 124, wbc 7.6, hb 12.0, plt 256, tsh 1.22, ua neg  From 9/16 showed   gluc 105, cr 0.89, gfr>60, alt 14,         chol 198, tg 54, hdl 93, ldl-c 94  From 4/17 showed               chol 204, tg 69, hdl 83, ldl-c 107, wbc 8.5, hb 11.5, plt 247, tsh 0.73  From 10/17 showed gluc 107, cr 0.90, gfr>60, alt 14, hba1c 5.9, chol 216, tg 68, hdl 99, ldl-c 103, wbc 6.8, hb 11.7, plt 262  From 4/18 showed   gluc 110, cr 0.81, gfr>60,    hba1c 6.1,                tsh 0.93, ft4 1.30  From 10/18 showed gluc 111, cr 0.87, gfr>60,    hba1c 6.0, chol 201, tg 72, hdl 94, ldl-c 93  From 7/19 showed   gluc 110, cr 0.94, gfr 58,  alt 11, hba1c 6.5, chol 203, tg 77, hdl 85, ldl-c 103, wbc 6.9, hb 11.6, plt 249, tsh 1.48, ft4 1.30  From 11/19 showed gluc 104, cr 0.99, gfr 55,    hba1c 6.0,                umar 5  From 6/20 showed               chol 202, tg 73, hdl 95, ldl-c 92,   wbc 6.7, hb 11.3, plt 258, tsh 1.18  From 12/20 showed gluc 118, cr 0.89, gfr>60, alt 13, hba1c 5.9,                k 3.3  From 6/21 showed   gluc 113, cr 0.91, gfr 72,   hba1c 6.1, chol 200, tg 52, hdl 85, ldl-c 105, wbc 7.4, hb 12.0, plt 233, tsh 1.97    We reviewed the patient's labs from the last several visits to point out trends in the numbers        Patient Active Problem List   Diagnosis Code    Multinodular goiter neg biopsy 2007 Dr. Jeanne Donato E04.2    Dyslipidemia E78.5    IFG (impaired fasting glucose) R73.01    Essential hypertension I10    Degenerative arthritis of knee, bilateral M17.0    GERD without esophagitis K21.9    Advance directive in chart Z78.9    Severe obesity (BMI 35.0-35.9 with comorbidity) ([facility]) E66.01, Z68.35    BMI 35.0-35.9,adult Z68.35     ASSESSMENT AND PLAN:   1. Hypertension. Continue current   2. Obesity. See separate discussion  3. Allergic rhinitis. Prn antihistamines and flonase  4. Fibrocystic breasts. F/U GYN and mammos  5. DJD. Prn nsaids  6. General.  Ca/D advocated. 7.  Thyroid. Follow   8. Dyslipidemia. Continue current regimen. 9.  IFG. Lifestyle and dietary measures. Portion control reiterated, wt loss would be ideal        RTC 12/21    Above conditions discussed at length and patient vocalized understanding.   All questions answered to patient satisfaction

## 2021-06-28 ENCOUNTER — OFFICE VISIT (OUTPATIENT)
Dept: INTERNAL MEDICINE CLINIC | Age: 76
End: 2021-06-28
Payer: MEDICARE

## 2021-06-28 VITALS
HEART RATE: 77 BPM | TEMPERATURE: 97.7 F | BODY MASS INDEX: 35.82 KG/M2 | WEIGHT: 215 LBS | OXYGEN SATURATION: 99 % | SYSTOLIC BLOOD PRESSURE: 132 MMHG | HEIGHT: 65 IN | RESPIRATION RATE: 14 BRPM | DIASTOLIC BLOOD PRESSURE: 66 MMHG

## 2021-06-28 DIAGNOSIS — K21.9 GERD WITHOUT ESOPHAGITIS: ICD-10-CM

## 2021-06-28 DIAGNOSIS — E78.5 DYSLIPIDEMIA: ICD-10-CM

## 2021-06-28 DIAGNOSIS — E66.01 SEVERE OBESITY (BMI 35.0-35.9 WITH COMORBIDITY) (HCC): ICD-10-CM

## 2021-06-28 DIAGNOSIS — R73.01 IFG (IMPAIRED FASTING GLUCOSE): ICD-10-CM

## 2021-06-28 DIAGNOSIS — I10 ESSENTIAL HYPERTENSION: Primary | ICD-10-CM

## 2021-06-28 DIAGNOSIS — E04.2 MULTINODULAR GOITER: ICD-10-CM

## 2021-06-28 PROCEDURE — 1090F PRES/ABSN URINE INCON ASSESS: CPT | Performed by: INTERNAL MEDICINE

## 2021-06-28 PROCEDURE — G8399 PT W/DXA RESULTS DOCUMENT: HCPCS | Performed by: INTERNAL MEDICINE

## 2021-06-28 PROCEDURE — G8417 CALC BMI ABV UP PARAM F/U: HCPCS | Performed by: INTERNAL MEDICINE

## 2021-06-28 PROCEDURE — G8536 NO DOC ELDER MAL SCRN: HCPCS | Performed by: INTERNAL MEDICINE

## 2021-06-28 PROCEDURE — G8427 DOCREV CUR MEDS BY ELIG CLIN: HCPCS | Performed by: INTERNAL MEDICINE

## 2021-06-28 PROCEDURE — G8752 SYS BP LESS 140: HCPCS | Performed by: INTERNAL MEDICINE

## 2021-06-28 PROCEDURE — G8754 DIAS BP LESS 90: HCPCS | Performed by: INTERNAL MEDICINE

## 2021-06-28 PROCEDURE — 1101F PT FALLS ASSESS-DOCD LE1/YR: CPT | Performed by: INTERNAL MEDICINE

## 2021-06-28 PROCEDURE — 99214 OFFICE O/P EST MOD 30 MIN: CPT | Performed by: INTERNAL MEDICINE

## 2021-06-28 PROCEDURE — G8510 SCR DEP NEG, NO PLAN REQD: HCPCS | Performed by: INTERNAL MEDICINE

## 2021-06-28 PROCEDURE — 3017F COLORECTAL CA SCREEN DOC REV: CPT | Performed by: INTERNAL MEDICINE

## 2021-06-28 NOTE — PROGRESS NOTES
Parker Lagunas presents today for   Chief Complaint   Patient presents with    Follow-up     6 month    Hypertension    Cholesterol Problem    Labs     6-21-21            Coordination of Care:  1. Have you been to the ER, urgent care clinic since your last visit? Hospitalized since your last visit? NO    2. Have you seen or consulted any other health care providers outside of the [address] [place] [address] since your last visit? Include any pap smears or colon screening.  NO

## 2021-06-28 NOTE — PROGRESS NOTES
Discussion about weight loss    Body mass index is 35.78 kg/m². Vitals 6/28/2021 12/28/2020 1/21/2020   Weight 215 lb 214 lb 207 lb   SpO2 99 100 98   BSA 2.11 m2 2.11 m2 2.08 m2   BMI 35.78 kg/m2 35.61 kg/m2 34.45 kg/m2     Discussion about modifying behaviors regarding diet and exercise undertaken    Increase activity as tolerated   - she is trying to exercise regularly at home    Cut back carbs and fatty foods.     Calorie counting would be ideal   - admits that the pandemic has made her eat more at home    Option of appetite suppressants discussed briefly and declined   - due to concerns about sfx and cost    Discussed sending to nutritionist for further teaching    - she had gone for the free classes    Intermittent fasting discussed at length, concepts explained, risks and benefits elaborated upon   - unable to do    We reiterated target of up to 5% wt loss as being realistic over the next 6-12 months and possibly aiming for higher than that in the future     Will come back for reevaluation and further management at subsequent visits which will be determined by patient response    Total time 15 min

## 2021-08-11 DIAGNOSIS — F43.21 GRIEF REACTION: ICD-10-CM

## 2021-08-16 RX ORDER — DIAZEPAM 5 MG/1
TABLET ORAL
Qty: 30 TABLET | Refills: 1 | Status: SHIPPED | OUTPATIENT
Start: 2021-08-16 | End: 2022-03-21

## 2021-09-21 DIAGNOSIS — I10 ESSENTIAL HYPERTENSION: ICD-10-CM

## 2021-09-21 DIAGNOSIS — E78.5 DYSLIPIDEMIA: ICD-10-CM

## 2021-09-21 DIAGNOSIS — G47.00 INSOMNIA, UNSPECIFIED TYPE: ICD-10-CM

## 2021-09-21 NOTE — TELEPHONE ENCOUNTER
Rx's pended to new pharmacy as requested    Last Visit: 6/28/21 with MD Radha Robles  Next Appointment: 12/13/21 with MD Radha Robles    Requested Prescriptions     Pending Prescriptions Disp Refills    pravastatin (PRAVACHOL) 20 mg tablet 90 Tablet 3     Sig: Take 1 Tablet by mouth nightly.  triamterene-hydroCHLOROthiazide (MAXZIDE) 75-50 mg per tablet 90 Tablet 3     Sig: Take 1 Tablet by mouth daily.  traZODone (DESYREL) 50 mg tablet 90 Tablet 3     Sig: Take 1 Tablet by mouth nightly as needed for Sleep.  potassium chloride (KLOR-CON) 10 mEq tablet 90 Tablet 3     Sig: Take 1 Tablet by mouth daily.

## 2021-09-22 RX ORDER — PRAVASTATIN SODIUM 20 MG/1
20 TABLET ORAL
Qty: 90 TABLET | Refills: 3 | Status: SHIPPED | OUTPATIENT
Start: 2021-09-22 | End: 2022-10-18 | Stop reason: SDUPTHER

## 2021-09-22 RX ORDER — TRIAMTERENE AND HYDROCHLOROTHIAZIDE 75; 50 MG/1; MG/1
1 TABLET ORAL DAILY
Qty: 90 TABLET | Refills: 3 | Status: SHIPPED | OUTPATIENT
Start: 2021-09-22 | End: 2022-06-27 | Stop reason: ALTCHOICE

## 2021-09-22 RX ORDER — TRAZODONE HYDROCHLORIDE 50 MG/1
50 TABLET ORAL
Qty: 90 TABLET | Refills: 3 | Status: SHIPPED | OUTPATIENT
Start: 2021-09-22 | End: 2021-12-13 | Stop reason: ALTCHOICE

## 2021-09-22 RX ORDER — POTASSIUM CHLORIDE 750 MG/1
10 TABLET, EXTENDED RELEASE ORAL DAILY
Qty: 90 TABLET | Refills: 3 | Status: SHIPPED | OUTPATIENT
Start: 2021-09-22 | End: 2022-06-27 | Stop reason: ALTCHOICE

## 2021-11-29 ENCOUNTER — TRANSCRIBE ORDER (OUTPATIENT)
Dept: SCHEDULING | Age: 76
End: 2021-11-29

## 2021-11-29 DIAGNOSIS — Z12.31 VISIT FOR SCREENING MAMMOGRAM: Primary | ICD-10-CM

## 2021-12-06 ENCOUNTER — APPOINTMENT (OUTPATIENT)
Dept: INTERNAL MEDICINE CLINIC | Age: 76
End: 2021-12-06

## 2021-12-07 ENCOUNTER — HOSPITAL ENCOUNTER (OUTPATIENT)
Dept: MAMMOGRAPHY | Age: 76
Discharge: HOME | End: 2021-12-07
Attending: OBSTETRICS & GYNECOLOGY
Payer: MEDICARE

## 2021-12-07 DIAGNOSIS — Z12.31 VISIT FOR SCREENING MAMMOGRAM: ICD-10-CM

## 2021-12-07 LAB
BUN SERPL-MCNC: 16 MG/DL (ref 8–27)
BUN/CREAT SERPL: 18 (ref 12–28)
CALCIUM SERPL-MCNC: 9.2 MG/DL (ref 8.7–10.3)
CHLORIDE SERPL-SCNC: 100 MMOL/L (ref 96–106)
CO2 SERPL-SCNC: 25 MMOL/L (ref 20–29)
CREAT SERPL-MCNC: 0.89 MG/DL (ref 0.57–1)
EST. AVERAGE GLUCOSE BLD GHB EST-MCNC: 128 MG/DL
GLUCOSE SERPL-MCNC: 105 MG/DL (ref 65–99)
HBA1C MFR BLD: 6.1 % (ref 4.8–5.6)
POTASSIUM SERPL-SCNC: 3.6 MMOL/L (ref 3.5–5.2)
SODIUM SERPL-SCNC: 141 MMOL/L (ref 134–144)

## 2021-12-07 PROCEDURE — 77063 BREAST TOMOSYNTHESIS BI: CPT

## 2021-12-08 NOTE — PROGRESS NOTES
76 y.o. BLACK/ female who presents for evaluation. She seems to be doing well. She has not been doing exercises at home on the bike and toning machines mainly due to her time being taken up by a lot of volunteer work. No cardiovascular complaints. No GI or  complaints     No sx referable to the thyroid    Denies polyuria, polydipsia, nocturia, vision change. Weight stable     LAST MEDICARE WELLNESS EXAM: 3/5/14, 8/27/15, 9/20/16, 10/17/17, 10/29/18, 12/5/19, 12/28/20, 12/13/21    Past Medical History:   Diagnosis Date    Allergic rhinitis     Anemia     chronic    Cataract     Chronic constipation     Dyslipidemia     10 year calculated risk score was 10.3% (7/13); 11.3% (10/14); 15.2% (3/16)    FHx: heart disease     Fibrocystic breast     neg bx x2 1970s    GERD (gastroesophageal reflux disease)     Hypertension     IFG (impaired fasting glucose) 2/14    Mor[address] [address]     peak weight 230 lbs, bmi 38.8 from 10/11; start weight 209 lbs but not doing; W - 7/19    Multinodular goiter 2007    negative biopsy Dr. Russell Manzanares Osteoarthritis, knee     Dr. Jaime Sessions     Past Surgical History:   Procedure Laterality Date    DEXA BONE DENSITY STUDY AXIAL      DEXA t score 0.2 spine, -0.2 hip (9/09); -0.1 spine, -0.2 hip (3/14)    HX APPENDECTOMY      HX BREAST BIOPSY      1967 (RT)/ 1971 (LT)   Nate Kumar negative 2002, Dr. Clemente Del Valle negative 4/3/12    HX DILATION AND CURETTAGE      x2     HX HEMORRHOIDECTOMY      HX HYSTERECTOMY      due to  leimyomata    HX PELVIC LAPAROSCOPY       Allergies   Allergen Reactions    Codeine Itching     \"Loopy\"     Current Outpatient Medications   Medication Sig    pravastatin (PRAVACHOL) 20 mg tablet Take 1 Tablet by mouth nightly.  triamterene-hydroCHLOROthiazide (MAXZIDE) 75-50 mg per tablet Take 1 Tablet by mouth daily.  potassium chloride (KLOR-CON) 10 mEq tablet Take 1 Tablet by mouth daily.     diazePAM (VALIUM) 5 mg tablet TAKE 1 TABLET BY MOUTH EVERY 12 HOURS AS NEEDED FOR ANXIETY. MAX DAILY AMOUNT: 10 MG    fluticasone propionate (FLONASE) 50 mcg/actuation nasal spray USE 2 SPRAYS IN EACH NOSTRIL DAILY (Patient taking differently: USE 2 SPRAYS IN EACH NOSTRIL DAILY PRN)     No current facility-administered medications for this visit. REVIEW OF SYSTEMS: gyn Dr Rosita Russ 3/14, mammo 12/21, DEXA 3/14, colo 4/12 Dr José Miguel Springer, sees Dr Marilyn Paul  Ophtho  no vision change or eye pain  Oral  no mouth pain, tongue or tooth problems  Ears  no hearing loss, ear pain, fullness, no swallowing problems  Cardiac  no CP, PND, orthopnea, edema, palpitations or syncope  Chest  no breast masses  Resp  no wheezing, chronic coughing, dyspnea  GI  no heartburn, nausea, vomiting, change in bowel habits, bleeding, hemorrhoids  Urinary  no dysuria, hematuria, flank pain, urgency, frequency    Visit Vitals  /60   Pulse 78   Temp 97 °F (36.1 °C) (Temporal)   Resp 16   Ht 5' 5\" (1.651 m)   Wt 210 lb (95.3 kg)   SpO2 100%   BMI 34.95 kg/m²     A&O x3  Affect is appropriate. Mood stable  No apparent distress  Anicteric, no JVD, adenopathy or thyromegaly. No carotid bruits or radiated murmur  Lungs clear to auscultation, no wheezes or rales  Heart showed regular rate and rhythm. No murmur, rubs, gallops  Abdomen soft nontender, no hepatosplenomegaly or masses. Extremities without edema. Pulses 1-2+ symmetrically.      LABS  From 12/08                                      ua neg  From 7/09 showed   gluc 98,   cr 0.90,     alt 10,          chol 223, tg 75, hdl 71, ldl-c 137  From 12/11 showed               ldl-p 1525, chol 247, tg 61, hdl 78, ldl-c 157, wbc 7.6, hb 11.7, plt 265  From 6/12 showed                   ldl-p 1177, chol 238, tg 48, hdl 78, ldl-c 152,           tsh 1.81  From 1/13 showed   gluc 99,   cr 0.95, gfr 72,  alt<5,  ldl-p 1238, chol 206, tg 56, hdl 75, ldl-c 120, wbc 8.2, hb 11.4, plt 259  From 7/13 showed chol 221, tg 72, hdl 72, ldl-c 135  From 2/14 showed   gluc 101, cr 0.78, gfr 90,                   tsh 2.04  From 10/14 showed gluc 94,   cr 1.02, gfr>60, alt 5,   hba1c 6.2, chol 231, tg 73, hdl 83, ldl-c 133, wbc 7.9, hb 11.9, plt 281  From 3/16 showed   gluc 107, cr 0.75, gfr>60, alt 11, hba1c 5.2, chol 229, tg 74, hdl 90, ldl-c 124, wbc 7.6, hb 12.0, plt 256, tsh 1.22, ua neg  From 9/16 showed   gluc 105, cr 0.89, gfr>60, alt 14,         chol 198, tg 54, hdl 93, ldl-c 94  From 4/17 showed               chol 204, tg 69, hdl 83, ldl-c 107, wbc 8.5, hb 11.5, plt 247, tsh 0.73  From 10/17 showed gluc 107, cr 0.90, gfr>60, alt 14, hba1c 5.9, chol 216, tg 68, hdl 99, ldl-c 103, wbc 6.8, hb 11.7, plt 262  From 4/18 showed   gluc 110, cr 0.81, gfr>60,    hba1c 6.1,                tsh 0.93, ft4 1.30  From 10/18 showed gluc 111, cr 0.87, gfr>60,    hba1c 6.0, chol 201, tg 72, hdl 94, ldl-c 93  From 7/19 showed   gluc 110, cr 0.94, gfr 58,  alt 11, hba1c 6.5, chol 203, tg 77, hdl 85, ldl-c 103, wbc 6.9, hb 11.6, plt 249, tsh 1.48, ft4 1.30  From 11/19 showed gluc 104, cr 0.99, gfr 55,    hba1c 6.0,                umar 5  From 6/20 showed               chol 202, tg 73, hdl 95, ldl-c 92,   wbc 6.7, hb 11.3, plt 258, tsh 1.18  From 12/20 showed gluc 118, cr 0.89, gfr>60, alt 13, hba1c 5.9,                k 3.3  From 6/21 showed   gluc 113, cr 0.91, gfr 72,   hba1c 6.1, chol 200, tg 52, hdl 85, ldl-c 105, wbc 7.4, hb 12.0, plt 233, tsh 1.97    Results for orders placed or performed in visit on 55/88/38   METABOLIC PANEL, BASIC   Result Value Ref Range    Glucose 105 (H) 65 - 99 mg/dL    BUN 16 8 - 27 mg/dL    Creatinine 0.89 0.57 - 1.00 mg/dL    GFR est non-AA 64 >59 mL/min/1.73    GFR est AA 73 >59 mL/min/1.73    BUN/Creatinine ratio 18 12 - 28    Sodium 141 134 - 144 mmol/L    Potassium 3.6 3.5 - 5.2 mmol/L    Chloride 100 96 - 106 mmol/L    CO2 25 20 - 29 mmol/L    Calcium 9.2 8.7 - 10.3 mg/dL   HEMOGLOBIN A1C WITH EAG   Result Value Ref Range    Hemoglobin A1c 6.1 (H) 4.8 - 5.6 %    Estimated average glucose 128 mg/dL     We reviewed the patient's labs from the last several visits to point out trends in the numbers        Patient Active Problem List   Diagnosis Code    Multinodular goiter neg biopsy 2007 Dr. Liam Johnson E04.2    Dyslipidemia E78.5    IFG (impaired fasting glucose) R73.01    Essential hypertension I10    Degenerative arthritis of knee, bilateral M17.0    GERD without esophagitis K21.9    Advance directive in chart Z78.9    Severe obesity (BMI 35.0-35.9 with comorbidity) ([facility]) E66.01, Z68.35    BMI 35.0-35.9,adult Z68.35     ASSESSMENT AND PLAN:   1. Hypertension. Controlled on current   2. Obesity. See separate discussion  3. Allergic rhinitis. Prn antihistamines and flonase  4. Fibrocystic breasts. F/U GYN and mammos  5. DJD. Prn nsaids  6. General.  Ca/D advocated. 7.  Thyroid. Follow   8. Dyslipidemia. At targer  9. IFG. Lifestyle and dietary measures. Portion control reiterated, wt loss would be ideal        RTC 6/22    Above conditions discussed at length and patient vocalized understanding. All questions answered to patient satisfaction        ICD-10-CM ICD-9-CM    1. Essential hypertension  I10 401.9    2. GERD without esophagitis  K21.9 530.81    3. IFG (impaired fasting glucose)  A96.25 795.64 METABOLIC PANEL, COMPREHENSIVE      HEMOGLOBIN A1C WITH EAG   4. Multinodular goiter neg biopsy 2007 Dr. Liam Johnson  E04.2 241.1 TSH 3RD GENERATION   5. Dyslipidemia  E78.5 272.4 CBC W/O DIFF      LIPID PANEL   6. Medicare annual wellness visit, subsequent  Z00.00 V70.0    7.  Screen for colon cancer  Z12.11 V76.51

## 2021-12-08 NOTE — PROGRESS NOTES
This is a Subsequent Medicare Annual Wellness Visit     I have reviewed the patient's medical history in detail and updated the computerized patient record. Assessment/Plan   Education and counseling provided:  Are appropriate based on today's review and evaluation  End-of-Life planning (with patient's consent)  Influenza Vaccine  Screening Mammography  Colorectal cancer screening tests  Cardiovascular screening blood test  Diabetes screening test    1. Essential hypertension  2. GERD without esophagitis  3. IFG (impaired fasting glucose)  -     METABOLIC PANEL, COMPREHENSIVE; Future  -     HEMOGLOBIN A1C WITH EAG; Future  4. Multinodular goiter neg biopsy 2007 Dr. Cindy Barrios  -     TSH Eric Mcneal; Future  5. Dyslipidemia  -     CBC W/O DIFF; Future  -     LIPID PANEL; Future  6. Medicare annual wellness visit, subsequent  7. Screen for colon cancer       Depression Risk Factor Screening     3 most recent PHQ Screens 12/13/2021   Little interest or pleasure in doing things Not at all   Feeling down, depressed, irritable, or hopeless Not at all   Total Score PHQ 2 0       Alcohol Risk Screen    Do you average more than 1 drink per night or more than 7 drinks a week:  No    On any one occasion in the past three months have you have had more than 3 drinks containing alcohol:  No        Functional Ability and Level of Safety    Hearing: Hearing is good. Activities of Daily Living: The home contains: no safety equipment. Patient does total self care      Ambulation: with no difficulty     Fall Risk:  Fall Risk Assessment, last 12 mths 12/13/2021   Able to walk? Yes   Fall in past 12 months? 0   Do you feel unsteady?  0   Are you worried about falling 0      Abuse Screen:  Patient is not abused       Cognitive Screening    Has your family/caregiver stated any concerns about your memory: no     Cognitive Screening: Normal - Mini Cog Test    Health Maintenance Due     Health Maintenance Due   Topic Date Due    Pneumococcal 65+ years (2 of 2 - PPSV23) 12/20/2016    Flu Vaccine (1) 09/01/2021       Patient Care Team   Patient Care Team:  Shannon Jorge MD as PCP - General (Internal Medicine)  Shannon Jorge MD as PCP - [facility] Empaneled Provider    History     Patient Active Problem List   Diagnosis Code    Multinodular goiter neg biopsy 2007 Dr. Tirso Tapia E04.2    Dyslipidemia E78.5    IFG (impaired fasting glucose) R73.01    Essential hypertension I10    Degenerative arthritis of knee, bilateral M17.0    GERD without esophagitis K21.9    Advance directive in chart Z78.9    Severe obesity (BMI 35.0-35.9 with comorbidity) ([facility] [address] E66.01, Z68.35    BMI 35.0-35.9,adult Z68.35     Past Medical History:   Diagnosis Date    Allergic rhinitis     Anemia     chronic    Cataract     Chronic constipation     Dyslipidemia     10 year calculated risk score was 10.3% (7/13); 11.3% (10/14); 15.2% (3/16)    FHx: heart disease     Fibrocystic breast     neg bx x2 1970s    GERD (gastroesophageal reflux disease)     Hypertension     IFG (impaired fasting glucose) 2/14    Morbid obesity (HCC)     peak weight 230 lbs, bmi 38.8 from 10/11; start weight 209 lbs but not doing; W - 7/19    Multinodular goiter 2007    negative biopsy Dr. Radha Woodson Osteoarthritis, knee     Dr. Julienne Hopkins      Past Surgical History:   Procedure Laterality Date    DEXA BONE DENSITY STUDY AXIAL      DEXA t score 0.2 spine, -0.2 hip (9/09); -0.1 spine, -0.2 hip (3/14)    HX APPENDECTOMY      HX BREAST BIOPSY      1967 (RT)/ 1971 (LT)   Nandini List      Dr. Leann Holder negative 2002, Dr. Aden Berry negative 4/3/12    HX DILATION AND CURETTAGE      x2     HX HEMORRHOIDECTOMY      HX HYSTERECTOMY      due to  leimyomata    HX PELVIC LAPAROSCOPY       Current Outpatient Medications   Medication Sig Dispense Refill    pravastatin (PRAVACHOL) 20 mg tablet Take 1 Tablet by mouth nightly.  90 Tablet 3    triamterene-hydroCHLOROthiazide (MAXZIDE) 75-50 mg per tablet Take 1 Tablet by mouth daily. 90 Tablet 3    potassium chloride (KLOR-CON) 10 mEq tablet Take 1 Tablet by mouth daily. 90 Tablet 3    diazePAM (VALIUM) 5 mg tablet TAKE 1 TABLET BY MOUTH EVERY 12 HOURS AS NEEDED FOR ANXIETY.  MAX DAILY AMOUNT: 10 MG 30 Tablet 1    fluticasone propionate (FLONASE) 50 mcg/actuation nasal spray USE 2 SPRAYS IN EACH NOSTRIL DAILY (Patient taking differently: USE 2 SPRAYS IN EACH NOSTRIL DAILY PRN) 48 g 3     Allergies   Allergen Reactions    Codeine Itching     \"Loopy\"       Family History   Problem Relation Age of Onset    Hypertension Mother     Hypertension Father     Cancer Father         pancreatic    Hypertension Brother     Cancer Brother 46        lung    Hypertension Sister     Diabetes Brother     Heart Disease Sister      Social History     Tobacco Use    Smoking status: Never Smoker    Smokeless tobacco: Never Used   Substance Use Topics    Alcohol use: No         Jean Paul Burns MD

## 2021-12-13 ENCOUNTER — OFFICE VISIT (OUTPATIENT)
Dept: INTERNAL MEDICINE CLINIC | Age: 76
End: 2021-12-13
Payer: MEDICARE

## 2021-12-13 VITALS
HEART RATE: 78 BPM | WEIGHT: 210 LBS | OXYGEN SATURATION: 100 % | SYSTOLIC BLOOD PRESSURE: 120 MMHG | TEMPERATURE: 97 F | RESPIRATION RATE: 16 BRPM | DIASTOLIC BLOOD PRESSURE: 60 MMHG | BODY MASS INDEX: 34.99 KG/M2 | HEIGHT: 65 IN

## 2021-12-13 DIAGNOSIS — R73.01 IFG (IMPAIRED FASTING GLUCOSE): ICD-10-CM

## 2021-12-13 DIAGNOSIS — K21.9 GERD WITHOUT ESOPHAGITIS: ICD-10-CM

## 2021-12-13 DIAGNOSIS — Z00.00 MEDICARE ANNUAL WELLNESS VISIT, SUBSEQUENT: Primary | ICD-10-CM

## 2021-12-13 DIAGNOSIS — I10 ESSENTIAL HYPERTENSION: ICD-10-CM

## 2021-12-13 DIAGNOSIS — E78.5 DYSLIPIDEMIA: ICD-10-CM

## 2021-12-13 DIAGNOSIS — E04.2 MULTINODULAR GOITER: ICD-10-CM

## 2021-12-13 DIAGNOSIS — Z12.11 SCREEN FOR COLON CANCER: ICD-10-CM

## 2021-12-13 PROCEDURE — G0439 PPPS, SUBSEQ VISIT: HCPCS | Performed by: INTERNAL MEDICINE

## 2021-12-13 PROCEDURE — G8754 DIAS BP LESS 90: HCPCS | Performed by: INTERNAL MEDICINE

## 2021-12-13 PROCEDURE — 1090F PRES/ABSN URINE INCON ASSESS: CPT | Performed by: INTERNAL MEDICINE

## 2021-12-13 PROCEDURE — G8536 NO DOC ELDER MAL SCRN: HCPCS | Performed by: INTERNAL MEDICINE

## 2021-12-13 PROCEDURE — G8427 DOCREV CUR MEDS BY ELIG CLIN: HCPCS | Performed by: INTERNAL MEDICINE

## 2021-12-13 PROCEDURE — G8417 CALC BMI ABV UP PARAM F/U: HCPCS | Performed by: INTERNAL MEDICINE

## 2021-12-13 PROCEDURE — G8510 SCR DEP NEG, NO PLAN REQD: HCPCS | Performed by: INTERNAL MEDICINE

## 2021-12-13 PROCEDURE — G8399 PT W/DXA RESULTS DOCUMENT: HCPCS | Performed by: INTERNAL MEDICINE

## 2021-12-13 PROCEDURE — G8752 SYS BP LESS 140: HCPCS | Performed by: INTERNAL MEDICINE

## 2021-12-13 PROCEDURE — 3017F COLORECTAL CA SCREEN DOC REV: CPT | Performed by: INTERNAL MEDICINE

## 2021-12-13 PROCEDURE — 1101F PT FALLS ASSESS-DOCD LE1/YR: CPT | Performed by: INTERNAL MEDICINE

## 2021-12-13 PROCEDURE — 99214 OFFICE O/P EST MOD 30 MIN: CPT | Performed by: INTERNAL MEDICINE

## 2021-12-13 NOTE — PROGRESS NOTES
Chu Odom presents with   Chief Complaint   Patient presents with   [facility] Wellness Visit    Cholesterol Problem    Labs     12-06-21          1. \"Have you been to the ER, urgent care clinic since your last visit? Hospitalized since your last visit? \" NO    2. \"Have you seen or consulted any other health care providers outside of the [address] [place] [address] since your last visit? \" NO    3. For patients aged 39-70: Has the patient had a colonoscopy? Yes, HM satisfied with blue hyperlink     If the patient is female:    4. For patients aged 41-77: Has the patient had a mammogram within the past 2 years? NA based on age or sex    11. For patients aged 21-65: Has the patient had a pap smear?  NA based on age or sex

## 2021-12-13 NOTE — PATIENT INSTRUCTIONS
Medicare Wellness Visit, Female     The best way to live healthy is to have a lifestyle where you eat a well-balanced diet, exercise regularly, limit alcohol use, and quit all forms of tobacco/nicotine, if applicable. Regular preventive services are another way to keep healthy. Preventive services (vaccines, screening tests, monitoring & exams) can help personalize your care plan, which helps you manage your own care. Screening tests can find health problems at the earliest stages, when they are easiest to treat. [address] follows the current, evidence-based guidelines published by the [facility] Domenic Hansen ([facility]) when recommending preventive services for our patients. Because we follow these guidelines, sometimes recommendations change over time as research supports it. (For example, mammograms used to be recommended annually. Even though Medicare will still pay for an annual mammogram, the newer guidelines recommend a mammogram every two years for women of average risk). Of course, you and your doctor may decide to screen more often for some diseases, based on your risk and your co-morbidities (chronic disease you are already diagnosed with). Preventive services for you include:  - Medicare offers their members a free annual wellness visit, which is time for you and your primary care provider to discuss and plan for your preventive service needs. Take advantage of this benefit every year!  -All adults over the age of 72 should receive the recommended pneumonia vaccines. Current USPSTF guidelines recommend a series of two vaccines for the best pneumonia protection.   -All adults should have a flu vaccine yearly and a tetanus vaccine every 10 years.   -All adults age 48 and older should receive the shingles vaccines (series of two vaccines).       -All adults age 38-68 who are overweight should have a diabetes screening test once every three years.   -All adults born between [birth year] and 1965 should be screened once for Hepatitis C.  -Other screening tests and preventive services for persons with diabetes include: an eye exam to screen for diabetic retinopathy, a kidney function test, a foot exam, and stricter control over your cholesterol.   -Cardiovascular screening for adults with routine risk involves an electrocardiogram (ECG) at intervals determined by your doctor.   -Colorectal cancer screenings should be done for adults age 54-65 with no increased risk factors for colorectal cancer. There are a number of acceptable methods of screening for this type of cancer. Each test has its own benefits and drawbacks. Discuss with your doctor what is most appropriate for you during your annual wellness visit. The different tests include: colonoscopy (considered the best screening method), a fecal occult blood test, a fecal DNA test, and sigmoidoscopy.    -A bone mass density test is recommended when a woman turns 65 to screen for osteoporosis. This test is only recommended one time, as a screening. Some providers will use this same test as a disease monitoring tool if you already have osteoporosis. -Breast cancer screenings are recommended every other year for women of normal risk, age 54-69.  -Cervical cancer screenings for women over age 72 are only recommended with certain risk factors.      Here is a list of your current Health Maintenance items (your personalized list of preventive services) with a due date:  Health Maintenance Due   Topic Date Due    Pneumococcal Vaccine (2 of 2 - PPSV23) 12/20/2016    Yearly Flu Vaccine (1) 09/01/2021

## 2021-12-26 DIAGNOSIS — R73.01 IFG (IMPAIRED FASTING GLUCOSE): ICD-10-CM

## 2022-03-18 DIAGNOSIS — F43.21 GRIEF REACTION: ICD-10-CM

## 2022-03-21 RX ORDER — DIAZEPAM 5 MG/1
TABLET ORAL
Qty: 30 TABLET | Refills: 1 | Status: SHIPPED | OUTPATIENT
Start: 2022-03-21 | End: 2022-07-06

## 2022-06-12 DIAGNOSIS — E04.2 MULTINODULAR GOITER: ICD-10-CM

## 2022-06-12 DIAGNOSIS — R73.01 IFG (IMPAIRED FASTING GLUCOSE): ICD-10-CM

## 2022-06-12 DIAGNOSIS — E78.5 DYSLIPIDEMIA: ICD-10-CM

## 2022-06-13 ENCOUNTER — APPOINTMENT (OUTPATIENT)
Dept: INTERNAL MEDICINE CLINIC | Age: 77
End: 2022-06-13

## 2022-06-14 LAB
ALBUMIN SERPL-MCNC: 4.1 G/DL (ref 3.7–4.7)
ALBUMIN/GLOB SERPL: 1.4 {RATIO} (ref 1.2–2.2)
ALP SERPL-CCNC: 103 IU/L (ref 44–121)
ALT SERPL-CCNC: 7 IU/L (ref 0–32)
AST SERPL-CCNC: 13 IU/L (ref 0–40)
BILIRUB SERPL-MCNC: 0.4 MG/DL (ref 0–1.2)
BUN SERPL-MCNC: 19 MG/DL (ref 8–27)
BUN/CREAT SERPL: 21 (ref 12–28)
CALCIUM SERPL-MCNC: 9.2 MG/DL (ref 8.7–10.3)
CHLORIDE SERPL-SCNC: 101 MMOL/L (ref 96–106)
CHOLEST SERPL-MCNC: 218 MG/DL (ref 100–199)
CO2 SERPL-SCNC: 23 MMOL/L (ref 20–29)
CREAT SERPL-MCNC: 0.89 MG/DL (ref 0.57–1)
EGFR: 67 ML/MIN/1.73
ERYTHROCYTE [DISTWIDTH] IN BLOOD BY AUTOMATED COUNT: 12.9 % (ref 11.7–15.4)
GLOBULIN SER CALC-MCNC: 2.9 G/DL (ref 1.5–4.5)
GLUCOSE SERPL-MCNC: 143 MG/DL (ref 65–99)
HCT VFR BLD AUTO: 37.4 % (ref 34–46.6)
HDLC SERPL-MCNC: 86 MG/DL
HGB BLD-MCNC: 11.7 G/DL (ref 11.1–15.9)
IMP & REVIEW OF LAB RESULTS: NORMAL
LDLC SERPL CALC-MCNC: 118 MG/DL (ref 0–99)
MCH RBC QN AUTO: 28.7 PG (ref 26.6–33)
MCHC RBC AUTO-ENTMCNC: 31.3 G/DL (ref 31.5–35.7)
MCV RBC AUTO: 92 FL (ref 79–97)
PLATELET # BLD AUTO: 226 X10E3/UL (ref 150–450)
POTASSIUM SERPL-SCNC: 3.3 MMOL/L (ref 3.5–5.2)
PROT SERPL-MCNC: 7 G/DL (ref 6–8.5)
RBC # BLD AUTO: 4.07 X10E6/UL (ref 3.77–5.28)
SODIUM SERPL-SCNC: 140 MMOL/L (ref 134–144)
TRIGL SERPL-MCNC: 80 MG/DL (ref 0–149)
TSH SERPL DL<=0.005 MIU/L-ACNC: 1.63 UIU/ML (ref 0.45–4.5)
VLDLC SERPL CALC-MCNC: 14 MG/DL (ref 5–40)
WBC # BLD AUTO: 8.6 X10E3/UL (ref 3.4–10.8)

## 2022-06-14 NOTE — PROGRESS NOTES
68 y.o. BLACK/ female who presents for evaluation. She's doing well. She has gone back to the [facility] and also doing a lot of volunteer work. No cardiovascular complaints. She has had very good bp control, no cramps but laments the size of the k supp    No GI or  complaints, had colo done as below    No sx referable to the thyroid    Denies polyuria, polydipsia, nocturia, vision change. Weight has gone down substantially as below due to eating smaller portions and with sensible choices    Vitals 6/27/2022 12/13/2021 6/28/2021   Weight 197 lb 210 lb 215 lb     LAST MEDICARE WELLNESS EXAM: 3/5/14, 8/27/15, 9/20/16, 10/17/17, 10/29/18, 12/5/19, 12/28/20, 12/13/21    Past Medical History:   Diagnosis Date    Allergic rhinitis     Anemia     chronic    Cataract     Chronic constipation     Dyslipidemia     10 year calculated risk score was 10.3% (7/13); 11.3% (10/14); 15.2% (3/16)    FHx: heart disease     Fibrocystic breast     neg bx x2 1970s    GERD (gastroesophageal reflux disease)     Hypertension     IFG (impaired fasting glucose) 2/14    Morbid obesity ([address]     peak weight 230 lbs, bmi 38.8 from 10/11; start weight 209 lbs but not doing; W - 7/19    Multinodular goiter 2007    negative biopsy Dr. Mumtaz Brito Osteoarthritis, knee     Dr. Kashif Burrell     Past Surgical History:   Procedure Laterality Date    DEXA BONE DENSITY STUDY AXIAL      DEXA t score 0.2 spine, -0.2 hip (9/09); -0.1 spine, -0.2 hip (3/14)    HX APPENDECTOMY      HX BREAST BIOPSY      1967 (RT)/ 1971 (LT)   Kraig Toribio negative 2002, Dr. Tanvir Bo negative 4/3/12    HX DILATION AND CURETTAGE      x2     HX HEMORRHOIDECTOMY      HX HYSTERECTOMY      due to  leimyomata    HX PELVIC LAPAROSCOPY       Allergies   Allergen Reactions    Codeine Itching     \"Loopy\"     Current Outpatient Medications   Medication Sig    amLODIPine (NORVASC) 5 mg tablet Take 1 Tablet by mouth daily.     diazePAM (VALIUM) 5 mg tablet TAKE 1 TABLET BY MOUTH EVERY 12 HOURS AS NEEDED FOR ANXIETY. MAX DAILY AMOUNT: 10 MG    pravastatin (PRAVACHOL) 20 mg tablet Take 1 Tablet by mouth nightly.  fluticasone propionate (FLONASE) 50 mcg/actuation nasal spray USE 2 SPRAYS IN EACH NOSTRIL DAILY (Patient taking differently: USE 2 SPRAYS IN EACH NOSTRIL DAILY PRN)     No current facility-administered medications for this visit. REVIEW OF SYSTEMS: mammo 12/21, DEXA 3/14, colo 4/22 Dr Yo Has, sees Dr Abdifatah Torres  Ophtho - no vision change or eye pain  Oral - no mouth pain, tongue or tooth problems  Ears - no hearing loss, ear pain, fullness, no swallowing problems  Cardiac - no CP, PND, orthopnea, edema, palpitations or syncope  Chest - no breast masses  Resp - no wheezing, chronic coughing, dyspnea  GI - no heartburn, nausea, vomiting, change in bowel habits, bleeding, hemorrhoids  Urinary - no dysuria, hematuria, flank pain, urgency, frequency    Visit Vitals  /60   Pulse 71   Temp 97.1 °F (36.2 °C) (Temporal)   Resp 16   Ht 5' 5\" (1.651 m)   Wt 197 lb (89.4 kg)   SpO2 98%   BMI 32.78 kg/m²     A&O x3  Affect is appropriate. Mood stable  No apparent distress  Anicteric, no JVD, adenopathy or thyromegaly. No carotid bruits or radiated murmur  Lungs clear to auscultation, no wheezes or rales  Heart showed regular rate and rhythm. No murmur, rubs, gallops  Abdomen soft nontender, no hepatosplenomegaly or masses. Extremities without edema. Pulses 1-2+ symmetrically.      LABS  From 12/08                                      ua neg  From 7/09 showed   gluc 98,   cr 0.90,     alt 10,          chol 223, tg 75, hdl 71, ldl-c 137  From 12/11 showed               ldl-p 1525, chol 247, tg 61, hdl 78, ldl-c 157, wbc 7.6, hb 11.7, plt 265  From 6/12 showed                   ldl-p 1177, chol 238, tg 48, hdl 78, ldl-c 152,           tsh 1.81  From 1/13 showed   gluc 99,   cr 0.95, gfr 72,  alt<5,  ldl-p 1238, chol 206, tg 56, hdl 75, ldl-c 120, wbc 8.2, hb 11.4, plt 259  From 7/13 showed               chol 221, tg 72, hdl 72, ldl-c 135  From 2/14 showed   gluc 101, cr 0.78, gfr 90,                   tsh 2.04  From 10/14 showed gluc 94,   cr 1.02, gfr>60, alt 5,   hba1c 6.2, chol 231, tg 73, hdl 83, ldl-c 133, wbc 7.9, hb 11.9, plt 281  From 3/16 showed   gluc 107, cr 0.75, gfr>60, alt 11, hba1c 5.2, chol 229, tg 74, hdl 90, ldl-c 124, wbc 7.6, hb 12.0, plt 256, tsh 1.22, ua neg  From 9/16 showed   gluc 105, cr 0.89, gfr>60, alt 14,         chol 198, tg 54, hdl 93, ldl-c 94  From 4/17 showed               chol 204, tg 69, hdl 83, ldl-c 107, wbc 8.5, hb 11.5, plt 247, tsh 0.73  From 10/17 showed gluc 107, cr 0.90, gfr>60, alt 14, hba1c 5.9, chol 216, tg 68, hdl 99, ldl-c 103, wbc 6.8, hb 11.7, plt 262  From 4/18 showed   gluc 110, cr 0.81, gfr>60,    hba1c 6.1,                tsh 0.93, ft4 1.30  From 10/18 showed gluc 111, cr 0.87, gfr>60,    hba1c 6.0, chol 201, tg 72, hdl 94, ldl-c 93  From 7/19 showed   gluc 110, cr 0.94, gfr 58,  alt 11, hba1c 6.5, chol 203, tg 77, hdl 85, ldl-c 103, wbc 6.9, hb 11.6, plt 249, tsh 1.48, ft4 1.30  From 11/19 showed gluc 104, cr 0.99, gfr 55,    hba1c 6.0,                umar 5  From 6/20 showed               chol 202, tg 73, hdl 95, ldl-c 92,   wbc 6.7, hb 11.3, plt 258, tsh 1.18  From 12/20 showed gluc 118, cr 0.89, gfr>60, alt 13, hba1c 5.9,                k 3.3  From 6/21 showed   gluc 113, cr 0.91, gfr 72,   hba1c 6.1, chol 200, tg 52, hdl 85, ldl-c 105, wbc 7.4, hb 12.0, plt 233, tsh 1.97  From 12/21 showed gluc 105, cr 0.89, gfr>60,    hba1c 6.1    Results for orders placed or performed in visit on 06/12/22   CBC W/O DIFF   Result Value Ref Range    WBC 8.6 3.4 - 10.8 x10E3/uL    RBC 4.07 3.77 - 5.28 x10E6/uL    HGB 11.7 11.1 - 15.9 g/dL    HCT 37.4 34.0 - 46.6 %    MCV 92 79 - 97 fL    MCH 28.7 26.6 - 33.0 pg    MCHC 31.3 (L) 31.5 - 35.7 g/dL    RDW 12.9 11.7 - 15.4 %    PLATELET 650 281 - 660 x10E3/uL METABOLIC PANEL, COMPREHENSIVE   Result Value Ref Range    Glucose 143 (H) 65 - 99 mg/dL    BUN 19 8 - 27 mg/dL    Creatinine 0.89 0.57 - 1.00 mg/dL    eGFR 67 >59 mL/min/1.73    BUN/Creatinine ratio 21 12 - 28    Sodium 140 134 - 144 mmol/L    Potassium 3.3 (L) 3.5 - 5.2 mmol/L    Chloride 101 96 - 106 mmol/L    CO2 23 20 - 29 mmol/L    Calcium 9.2 8.7 - 10.3 mg/dL    Protein, total 7.0 6.0 - 8.5 g/dL    Albumin 4.1 3.7 - 4.7 g/dL    GLOBULIN, TOTAL 2.9 1.5 - 4.5 g/dL    A-G Ratio 1.4 1.2 - 2.2    Bilirubin, total 0.4 0.0 - 1.2 mg/dL    Alk. phosphatase 103 44 - 121 IU/L    AST (SGOT) 13 0 - 40 IU/L    ALT (SGPT) 7 0 - 32 IU/L   LIPID PANEL   Result Value Ref Range    Cholesterol, total 218 (H) 100 - 199 mg/dL    Triglyceride 80 0 - 149 mg/dL    HDL Cholesterol 86 >39 mg/dL    VLDL, calculated 14 5 - 40 mg/dL    LDL, calculated 118 (H) 0 - 99 mg/dL   TSH 3RD GENERATION   Result Value Ref Range    TSH 1.630 0.450 - 4.500 uIU/mL   CVD REPORT   Result Value Ref Range    INTERPRETATION Note      We reviewed the patient's labs from the last several visits to point out trends in the numbers    POC hba1c was 5.9    Patient Active Problem List   Diagnosis Code    Multinodular goiter neg biopsy 2007 Dr. Desiree Boudreaux E04.2    Dyslipidemia E78.5    IFG (impaired fasting glucose) R73.01    Essential hypertension I10    Degenerative arthritis of knee, bilateral M17.0    GERD without esophagitis K21.9    Advance directive in chart Z78.9    Severe obesity (BMI 35.0-35.9 with comorbidity) ([facility]) E66.01, Z68.35    BMI 35.0-35.9,adult Z68.35     ASSESSMENT AND PLAN:   1. Hypertension. Controlled on current but will change to amlo as losing too much k  2. Obesity. Congratulated her on the wt loss  3. Allergic rhinitis. Prn antihistamines and flonase  4. Fibrocystic breasts. F/U GYN and mammos  5. DJD. Prn nsaids  6. General.  Ca/D advocated. 7.  Thyroid. Follow   8. Dyslipidemia. continue prava for now  9. IFG. Lifestyle and dietary measures, improving with the wt loss (the fbs probably is erroneous as above)        RTC 10/22    Above conditions discussed at length and patient vocalized understanding. All questions answered to patient satisfaction        ICD-10-CM ICD-9-CM    1. Essential hypertension  I10 401.9 amLODIPine (NORVASC) 5 mg tablet      METABOLIC PANEL, BASIC   2. BMI 35.0-35.9,adult  Z68.35 V85.35    3. Dyslipidemia  E78.5 272.4 LIPID PANEL   4.  IFG (impaired fasting glucose)  R73.01 790.21 HEMOGLOBIN A1C WITH EAG

## 2022-06-27 ENCOUNTER — OFFICE VISIT (OUTPATIENT)
Dept: INTERNAL MEDICINE CLINIC | Age: 77
End: 2022-06-27
Payer: MEDICARE

## 2022-06-27 VITALS
HEIGHT: 65 IN | BODY MASS INDEX: 32.82 KG/M2 | RESPIRATION RATE: 16 BRPM | WEIGHT: 197 LBS | DIASTOLIC BLOOD PRESSURE: 60 MMHG | HEART RATE: 71 BPM | TEMPERATURE: 97.1 F | SYSTOLIC BLOOD PRESSURE: 110 MMHG | OXYGEN SATURATION: 98 %

## 2022-06-27 DIAGNOSIS — R73.01 IFG (IMPAIRED FASTING GLUCOSE): ICD-10-CM

## 2022-06-27 DIAGNOSIS — E78.5 DYSLIPIDEMIA: ICD-10-CM

## 2022-06-27 DIAGNOSIS — I10 ESSENTIAL HYPERTENSION: Primary | ICD-10-CM

## 2022-06-27 PROCEDURE — G8752 SYS BP LESS 140: HCPCS | Performed by: INTERNAL MEDICINE

## 2022-06-27 PROCEDURE — 1101F PT FALLS ASSESS-DOCD LE1/YR: CPT | Performed by: INTERNAL MEDICINE

## 2022-06-27 PROCEDURE — 99214 OFFICE O/P EST MOD 30 MIN: CPT | Performed by: INTERNAL MEDICINE

## 2022-06-27 PROCEDURE — 1123F ACP DISCUSS/DSCN MKR DOCD: CPT | Performed by: INTERNAL MEDICINE

## 2022-06-27 PROCEDURE — G8536 NO DOC ELDER MAL SCRN: HCPCS | Performed by: INTERNAL MEDICINE

## 2022-06-27 PROCEDURE — G8417 CALC BMI ABV UP PARAM F/U: HCPCS | Performed by: INTERNAL MEDICINE

## 2022-06-27 PROCEDURE — 1090F PRES/ABSN URINE INCON ASSESS: CPT | Performed by: INTERNAL MEDICINE

## 2022-06-27 PROCEDURE — G8427 DOCREV CUR MEDS BY ELIG CLIN: HCPCS | Performed by: INTERNAL MEDICINE

## 2022-06-27 PROCEDURE — G8432 DEP SCR NOT DOC, RNG: HCPCS | Performed by: INTERNAL MEDICINE

## 2022-06-27 PROCEDURE — G8399 PT W/DXA RESULTS DOCUMENT: HCPCS | Performed by: INTERNAL MEDICINE

## 2022-06-27 PROCEDURE — G8754 DIAS BP LESS 90: HCPCS | Performed by: INTERNAL MEDICINE

## 2022-06-27 RX ORDER — TRIAMTERENE AND HYDROCHLOROTHIAZIDE 75; 50 MG/1; MG/1
1 TABLET ORAL DAILY
Qty: 90 TABLET | Refills: 3 | Status: CANCELLED | OUTPATIENT
Start: 2022-06-27

## 2022-06-27 RX ORDER — AMLODIPINE BESYLATE 5 MG/1
5 TABLET ORAL DAILY
Qty: 90 TABLET | Refills: 1 | Status: SHIPPED | OUTPATIENT
Start: 2022-06-27

## 2022-07-06 NOTE — PERIOP NOTES
PRE-SURGICAL INSTRUCTIONS        Patient's Name:  Janetta Cogan      EAJVV'O Date:  7/6/2022            Covid Testing Date and Time:    Surgery Date:  7/11/2022                1. Do NOT eat or drink anything, including candy, gum, or ice chips after midnight on 07/10, unless you have specific instructions from your surgeon or anesthesia provider to do so.  2. You may brush your teeth before coming to the hospital.  3. No smoking 24 hours prior to the day of surgery. 4. No alcohol 24 hours prior to the day of surgery. 5. No recreational drugs for one week prior to the day of surgery. 6. Leave all valuables, including money/purse, at home. 7. Remove all jewelry, nail polish, acrylic nails, and makeup (including mascara); no lotions powders, deodorant, or perfume/cologne/after shave on the skin. 8. Follow instruction for Hibiclens washes and CHG wipes from surgeon's office. 9. Glasses/contact lenses and dentures may be worn to the hospital.  They will be removed prior to surgery. 10. Call your doctor if symptoms of a cold or illness develop within 24-48 hours prior to your surgery. 11.  If you are having an outpatient procedure, please make arrangements for a responsible ADULT TO [address] [place] [address] and stay with you for 24 hours after your surgery. 12. ONE VISITOR in the hospital at this time for outpatient procedures. Exceptions may be made for surgical admissions, per nursing unit guidelines      Special Instructions:      Bring list of CURRENT medications. Bring any pertinent legal medical records. Take these medications the morning of surgery with a sip of water:  Amlodipine  Follow physician instructions about stopping anticoagulants. Complete bowel prep per MD instructions. On the day of surgery, come in the main entrance of [facility]. Let the  at the desk know you are there for surgery.   A staff member will come escort you to the surgical area on the second floor. If you have any questions or concerns, please do not hesitate to call:     (Prior to the day of surgery) [facility] department:  [phone]   (Day of surgery) Pre-Op department:  [phone]    These surgical instructions were reviewed with Leonard during the [facility] phone call.

## 2022-07-08 ENCOUNTER — ANESTHESIA EVENT (OUTPATIENT)
Dept: ENDOSCOPY | Age: 77
End: 2022-07-08
Payer: MEDICARE

## 2022-07-11 ENCOUNTER — ANESTHESIA (OUTPATIENT)
Dept: ENDOSCOPY | Age: 77
End: 2022-07-11
Payer: MEDICARE

## 2022-07-11 ENCOUNTER — HOSPITAL ENCOUNTER (OUTPATIENT)
Age: 77
Setting detail: OUTPATIENT SURGERY
Discharge: HOME | End: 2022-07-11
Attending: INTERNAL MEDICINE | Admitting: INTERNAL MEDICINE
Payer: MEDICARE

## 2022-07-11 VITALS
DIASTOLIC BLOOD PRESSURE: 64 MMHG | HEART RATE: 74 BPM | RESPIRATION RATE: 16 BRPM | WEIGHT: 212 LBS | HEIGHT: 65 IN | SYSTOLIC BLOOD PRESSURE: 121 MMHG | TEMPERATURE: 97 F | BODY MASS INDEX: 35.32 KG/M2 | OXYGEN SATURATION: 100 %

## 2022-07-11 PROCEDURE — 74011250636 HC RX REV CODE- 250/636: Performed by: NURSE ANESTHETIST, CERTIFIED REGISTERED

## 2022-07-11 PROCEDURE — 77030008565 HC TBNG SUC IRR ERBE -B: Performed by: INTERNAL MEDICINE

## 2022-07-11 PROCEDURE — 74011250637 HC RX REV CODE- 250/637: Performed by: NURSE ANESTHETIST, CERTIFIED REGISTERED

## 2022-07-11 PROCEDURE — 00812 ANES LWR INTST SCR COLSC: CPT | Performed by: ANESTHESIOLOGY

## 2022-07-11 PROCEDURE — 99100 ANES PT EXTEME AGE<1 YR&>70: CPT | Performed by: ANESTHESIOLOGY

## 2022-07-11 PROCEDURE — 2709999900 HC NON-CHARGEABLE SUPPLY: Performed by: INTERNAL MEDICINE

## 2022-07-11 PROCEDURE — 76040000019: Performed by: INTERNAL MEDICINE

## 2022-07-11 PROCEDURE — 74011000250 HC RX REV CODE- 250: Performed by: ANESTHESIOLOGY

## 2022-07-11 PROCEDURE — 74011250636 HC RX REV CODE- 250/636: Performed by: ANESTHESIOLOGY

## 2022-07-11 PROCEDURE — 76060000031 HC ANESTHESIA FIRST 0.5 HR: Performed by: INTERNAL MEDICINE

## 2022-07-11 RX ORDER — PROPOFOL 10 MG/ML
INJECTION, EMULSION INTRAVENOUS AS NEEDED
Status: DISCONTINUED | OUTPATIENT
Start: 2022-07-11 | End: 2022-07-11 | Stop reason: HOSPADM

## 2022-07-11 RX ORDER — LIDOCAINE HYDROCHLORIDE 20 MG/ML
INJECTION, SOLUTION EPIDURAL; INFILTRATION; INTRACAUDAL; PERINEURAL AS NEEDED
Status: DISCONTINUED | OUTPATIENT
Start: 2022-07-11 | End: 2022-07-11 | Stop reason: HOSPADM

## 2022-07-11 RX ORDER — FAMOTIDINE 20 MG/1
20 TABLET, FILM COATED ORAL ONCE
Status: COMPLETED | OUTPATIENT
Start: 2022-07-11 | End: 2022-07-11

## 2022-07-11 RX ORDER — SODIUM CHLORIDE, SODIUM LACTATE, POTASSIUM CHLORIDE, CALCIUM CHLORIDE 600; 310; 30; 20 MG/100ML; MG/100ML; MG/100ML; MG/100ML
50 INJECTION, SOLUTION INTRAVENOUS CONTINUOUS
Status: DISCONTINUED | OUTPATIENT
Start: 2022-07-11 | End: 2022-07-11 | Stop reason: HOSPADM

## 2022-07-11 RX ORDER — LIDOCAINE HYDROCHLORIDE 10 MG/ML
0.1 INJECTION, SOLUTION EPIDURAL; INFILTRATION; INTRACAUDAL; PERINEURAL AS NEEDED
Status: DISCONTINUED | OUTPATIENT
Start: 2022-07-11 | End: 2022-07-11 | Stop reason: HOSPADM

## 2022-07-11 RX ADMIN — LIDOCAINE HYDROCHLORIDE 20 MG: 20 INJECTION, SOLUTION EPIDURAL; INFILTRATION; INTRACAUDAL; PERINEURAL at 10:06

## 2022-07-11 RX ADMIN — PROPOFOL 200 MG: 10 INJECTION, EMULSION INTRAVENOUS at 10:14

## 2022-07-11 RX ADMIN — FAMOTIDINE 20 MG: 20 TABLET ORAL at 09:17

## 2022-07-11 RX ADMIN — PROPOFOL 50 MG: 10 INJECTION, EMULSION INTRAVENOUS at 10:06

## 2022-07-11 RX ADMIN — SODIUM CHLORIDE, POTASSIUM CHLORIDE, SODIUM LACTATE AND CALCIUM CHLORIDE 50 ML/HR: 600; 310; 30; 20 INJECTION, SOLUTION INTRAVENOUS at 09:31

## 2022-07-11 NOTE — H&P
[email or web address]. [address]nn Ont[address]os [address]  Two [facility], [address]        Impression:   1.crcs      Plan:     1. [place] mac all risks benefits and alt discussed       Chief Complaint: crcs      HPI:  Alicia Roque is a 68 y.o. female who is being seen on consult for crcs.     PMH:   Past Medical History:   Diagnosis Date    Allergic rhinitis     Anemia     chronic    Cataract     Chronic constipation     Dyslipidemia     10 year calculated risk score was 10.3% (7/13); 11.3% (10/14); 15.2% (3/16)    FHx: heart disease     Fibrocystic breast     neg bx x2 1970s    GERD (gastroesophageal reflux disease)     Resolved    Hypertension     Morbid obesity ([facility] [address]     peak weight 230 lbs, bmi 38.8 from 10/11; start weight 209 lbs but not doing; W - 7/19    Osteoarthritis, knee     Dr. Jaime Sessions       PSH:   Past Surgical History:   Procedure Laterality Date    DEXA BONE DENSITY STUDY AXIAL      DEXA t score 0.2 spine, -0.2 hip (9/09); -0.1 spine, -0.2 hip (3/14)    HX APPENDECTOMY      HX BREAST BIOPSY      1967 (RT)/ 1971 (LT)    HX CATARACT REMOVAL Bilateral     HX COLONOSCOPY      Dr. Brayan Kumar negative 2002, Dr. Clemente Del Valle negative 4/3/12    HX DILATION AND CURETTAGE      x2    [facility] HX HEMORRHOIDECTOMY      HX HYSTERECTOMY      due to  [address]    HX PELVIC LAPAROSCOPY         Social HX:   Social History     Socioeconomic History    Marital status:      Spouse name: Not on file    Number of children: Not on file    Years of education: Not on file    Highest education level: Not on file   Occupational History    Occupation: retired principal   Tobacco Use    Smoking status: Never Smoker    Smokeless tobacco: Never Used   Vaping Use    Vaping Use: Never used   Substance and Sexual Activity    Alcohol use: Never    Drug use: Never    Sexual activity: Not on file   Other Topics Concern    Not on file   Social History Narrative    Not on file Social Determinants of Health     Financial Resource Strain:     Difficulty of Paying Living Expenses: Not on file   Food Insecurity:     Worried About Running Out of Food in the Last Year: Not on file    Trevor of Food in the Last Year: Not on file   Transportation Needs:     Lack of Transportation (Medical): Not on file    Lack of Transportation (Non-Medical): Not on file   Physical Activity:     Days of Exercise per Week: Not on file    Minutes of Exercise per Session: Not on file   Stress:     Feeling of Stress : Not on file   Social Connections:     Frequency of Communication with Friends and Family: Not on file    Frequency of Social Gatherings with Friends and Family: Not on file    Attends Congregation Services: Not on file    Active Member of Clubs or Organizations: Not on file    Attends Club or Organization Meetings: Not on file    Marital Status: Not on file   Intimate Partner Violence:     Fear of Current or Ex-Partner: Not on file    Emotionally Abused: Not on file    Physically Abused: Not on file    Sexually Abused: Not on file   Housing Stability:     Unable to Pay for Housing in the Last Year: Not on file    Number of [place] in the Last Year: Not on file    Unstable Housing in the Last Year: Not on file       FHX:   Family History   Problem Relation Age of Onset    Hypertension Mother     Hypertension Father     Cancer Father         pancreatic    Hypertension Brother     Cancer Brother 46        lung    Hypertension Sister     Diabetes Brother     Heart Disease Sister        Allergy:   Allergies   Allergen Reactions    Codeine Itching     \"Loopy\"       Home Medications:     Medications Prior to Admission   Medication Sig    amLODIPine (NORVASC) 5 mg tablet Take 1 Tablet by mouth daily.  pravastatin (PRAVACHOL) 20 mg tablet Take 1 Tablet by mouth nightly.     fluticasone propionate (FLONASE) 50 mcg/actuation nasal spray USE 2 SPRAYS IN EACH NOSTRIL DAILY (Patient taking differently: USE 2 SPRAYS IN EACH NOSTRIL DAILY PRN)       Review of Systems:     Constitutional: No fevers, chills, weight loss, fatigue. Skin: No rashes, pruritis, jaundice, ulcerations, erythema. HENT: No headaches, nosebleeds, sinus pressure, rhinorrhea, sore throat. Eyes: No visual changes, blurred vision, eye pain, photophobia, jaundice. Cardiovascular: No chest pain, heart palpitations. Respiratory: No cough, SOB, wheezing, chest discomfort, orthopnea. Gastrointestinal: neg   Genitourinary: No dysuria, bleeding, discharge, pyuria. Musculoskeletal: No weakness, arthralgias, wasting. Endo: No sweats. Heme: No bruising, easy bleeding. Allergies: As noted. Neurological: Cranial nerves intact. Alert and oriented. Gait not assessed. Psychiatric:  No anxiety, depression, hallucinations. Visit Vitals  /78 (BP 1 Location: Right arm, BP Patient Position: At rest)   Pulse 85   Temp 98.1 °F (36.7 °C)   Resp 18   Ht 5' 5\" (1.651 m)   Wt 96.2 kg (212 lb)   SpO2 99%   BMI 35.28 kg/m²       Physical Assessment:     constitutional: appearance: well developed, well nourished, normal habitus, no deformities, in no acute distress. skin: inspection: no rashes, ulcers, icterus or other lesions; no clubbing or telangiectasias. palpation: no induration or subcutaneos nodules. eyes: inspection: normal conjunctivae and lids; no jaundice pupils: symmetrical, normoreactive to light, normal accommodation and size. ENMT: mouth: normal oral mucosa,lips and gums; good dentition. oropharynx: normal tongue, hard and soft palate; posterior pharynx without erythema, exudate or lesions. neck: no masses organomegaly or tenderness. respiratory: effort: normal chest excursion; no intercostal retraction or accessory muscle use. cardiovascular: abdominal aorta: normal size and position; no bruits. palpation: PMI of normal size and position; normal rhythm; no thrill or murmurs. abdominal: abdomen: normal consistency; no tenderness or masses. hernias: no hernias appreciated. liver: normal size and consistency. spleen: not palpable. rectal: hemoccult/guaiac: not performed. musculoskeletal: no deformities or muscle wasting   lymphatic: axilae: not palpable. groin: not palpable. neck: within normal limits. other: not palpable. neurologic: cranial nerves: II-XII normal.   psychiatric: judgement/insight: within normal limits. memory: within normal limits for recent and remote events. mood and affect: no evidence of depression, anxiety or agitation. orientation: oriented to time, space and person. Basic Metabolic Profile   No results for input(s): NA, K, CL, CO2, BUN, GLU, CA, MG, PHOS in the last 72 hours. No lab exists for component: CREAT      CBC w/Diff    No results for input(s): WBC, RBC, HGB, HCT, MCV, MCH, MCHC, RDW, PLT, HGBEXT, HCTEXT, PLTEXT in the last 72 hours. No lab exists for component: MPV No results for input(s): GRANS, LYMPH, EOS, PRO, MYELO, METAS, BLAST in the last 72 hours. No lab exists for component: MONO, BASO     Hepatic Function   No results for input(s): ALB, TP, TBILI, AP, AML, LPSE in the last 72 hours. No lab exists for component: DBILI, GPT, SGOT       Hesham Diaz MD, M.D. [facility], [address] [place] [address]  [email or web address]

## 2022-07-11 NOTE — ANESTHESIA PREPROCEDURE EVALUATION
Relevant Problems   CARDIOVASCULAR   (+) Essential hypertension      GASTROINTESTINAL   (+) GERD without esophagitis      ENDOCRINE   (+) Severe obesity (BMI 35.0-35.9 with comorbidity) (HCC)       Anesthetic History   No history of anesthetic complications            Review of Systems / Medical History  Patient summary reviewed, nursing notes reviewed and pertinent labs reviewed    Pulmonary  Within defined limits                 Neuro/Psych   Within defined limits           Cardiovascular    Hypertension              Exercise tolerance: >4 METS     GI/Hepatic/Renal     GERD           Endo/Other      Hypothyroidism  Morbid obesity and arthritis     Other Findings              Physical Exam    Airway  Mallampati: II  TM Distance: 4 - 6 cm  Neck ROM: normal range of motion   Mouth opening: Normal     Cardiovascular  Regular rate and rhythm,  S1 and S2 normal,  no murmur, click, rub, or gallop  Rhythm: regular  Rate: normal         Dental  No notable dental hx       Pulmonary  Breath sounds clear to auscultation               Abdominal  GI exam deferred       Other Findings            Anesthetic Plan    ASA: 3  Anesthesia type: MAC          Induction: Intravenous  Anesthetic plan and risks discussed with: Patient

## 2022-07-11 NOTE — DISCHARGE INSTRUCTIONS
Colonoscopy: What to Expect at [address] [place] [address]  After a colonoscopy, you'll stay at the clinic until you wake up. Then you can go home. But you'll need to arrange for a ride. Your doctor will tell you when you can eat and do your other usual activities. Your doctor will talk to you about when you'll need your next colonoscopy. Your doctor can help you decide how often you need to be checked. This will depend on the results of your test and your risk for colorectal cancer. After the test, you may be bloated or have gas pains. You may need to pass gas. If a biopsy was done or a polyp was removed, you may have streaks of blood in your stool (feces) for a few days. Problems such as heavy rectal bleeding may not occur until several weeks after the test. This isn't common. But it can happen after polyps are removed. This care sheet gives you a general idea about how long it will take for you to recover. But each person recovers at a different pace. Follow the steps below to get better as quickly as possible. How can you care for yourself at home? Activity    · Rest when you feel tired. · You can do your normal activities when it feels okay to do so. Diet    · Follow your doctor's directions for eating. · Unless your doctor has told you not to, drink plenty of fluids. This helps to replace the fluids that were lost during the colon prep. · Do not drink alcohol. Medicines    · Your doctor will tell you if and when you can restart your medicines. You will also be given instructions about taking any new medicines. · If you take aspirin or some other blood thinner, ask your doctor if and when to start taking it again. Make sure that you understand exactly what your doctor wants you to do. · If polyps were removed or a biopsy was done during the test, your doctor may tell you not to take aspirin or other anti-inflammatory medicines for a few days.  These include ibuprofen (Advil, Motrin) and naproxen (Aleve). Other instructions    · For your safety, do not drive or operate machinery until the medicine wears off and you can think clearly. Your doctor may tell you not to drive or operate machinery until the day after your test.     · Do not sign legal documents or make major decisions until the medicine wears off and you can think clearly. The anesthesia can make it hard for you to fully understand what you are agreeing to. Follow-up care is a key part of your treatment and safety. Be sure to make and go to all appointments, and call your doctor if you are having problems. It's also a good idea to know your test results and keep a list of the medicines you take. When should you call for help? Call 911 anytime you think you may need emergency care. For example, call if:    · You passed out (lost consciousness). · You pass maroon or bloody stools. · You have trouble breathing. Call your doctor now or seek immediate medical care if:    · You have pain that does not get better after you take pain medicine. · You are sick to your stomach or cannot drink fluids. · You have new or worse belly pain. · You have blood in your stools. · You have a fever. · You cannot pass stools or gas. Watch closely for changes in your health, and be sure to contact your doctor if you have any problems. Where can you learn more? Go to http://www.gray.com/  Enter E264 in the search box to learn more about \"Colonoscopy: What to Expect at Home. \"  Current as of: September 8, 2021               Content Version: 13.2  © 2006-2022 Healthwise, Incorporated. Care instructions adapted under license by [employer] (which disclaims liability or warranty for this information).  If you have questions about a medical condition or this instruction, always ask your healthcare professional. [place] [address] any warranty or liability for your use of this information. DISCHARGE SUMMARY from Nurse     POST-PROCEDURE INSTRUCTIONS:    Call your Physician if you:  ? Observe any excess bleeding. ? Develop a temperature over 100.5o F.  ? Experience abdominal, shoulder or chest pain. ? Notice any signs of decreased circulation or nerve impairment to an extremity such as a change in color, persistent numbness, tingling, coldness or increase in pain. ? Vomit blood or you have nausea and vomiting lasting longer than 4 hours. ? Are unable to take medications. ? Are unable to urinate within 8 hours after discharge following general anesthesia or intravenous sedation. For the next 24 hours after receiving general anesthesia or intravenous sedation, or while taking prescription Narcotics, limit your activities:  ? Do NOT drive a motor vehicle, operate hazard machinery or power tools, or perform tasks that require coordination. The medication you received during your procedure may have some effect on your mental awareness. ? Do NOT make important personal or business decisions. The medication you received during your procedure may have some effect on your mental awareness. ? Do NOT drink alcoholic beverages. These drinks do not mix well with the medications that have been given to you. ? Upon discharge from the hospital, you must be accompanied by a responsible adult. ? Resume your diet as directed by your physician. ? Resume medications as your physician has prescribed. ? Please give a list of your current medications to your Primary Care Provider. ? Please update this list whenever your medications are discontinued, doses are changed, or new medications (including over-the-counter products) are added. ? Please carry medication information at all times in case of emergency situations. These are general instructions for a healthy lifestyle:    No smoking/ No tobacco products/ Avoid exposure to second hand smoke.    Surgeon General's Warning: Quitting smoking now greatly reduces serious risk to your health. Obesity, smoking, and a sedentary lifestyle greatly increase your risk for illness.  A healthy diet, regular physical exercise & weight monitoring are important for maintaining a healthy lifestyle   You may be retaining fluid if you have a history of heart failure or if you experience any of the following symptoms:  Weight gain of 3 pounds or more overnight or 5 pounds in a week, increased swelling in our hands or feet or shortness of breath while lying flat in bed. Please call your doctor as soon as you notice any of these symptoms; do not wait until your next office visit. Recognize signs and symptoms of STROKE:  F  -  Face looks uneven  A  -  Arms unable to move or move unevenly  S  -  Speech slurred or non-existent  T  -  Time to call 911 - as soon as signs and symptoms begin - DO NOT go back to bed or wait to see If you get better - TIME IS BRAIN. Colorectal Screening   Colorectal cancer almost always develops from precancerous polyps (abnormal growths) in the colon or rectum. Screening tests can find precancerous polyps, so that they can be removed before they turn into cancer. Screening tests can also find colorectal cancer early, when treatment works best.  Frankel Speak with your physician about when you should begin screening and how often you should be tested. [facility] Activation    Thank you for requesting access to [facility]. Please follow the instructions below to securely access and download your online medical record. [facility] allows you to send messages to your doctor, view your test results, renew your prescriptions, schedule appointments, and more. How Do I Sign Up? 1. In your internet browser, go to [email or web address]. 2. Click on the First Time User? Click Here link in the Sign In box. You will see the New Member Sign Up page.   3. Enter your [facility] Access Code exactly as it appears below. You will not need to use this code after youve completed the sign-up process. If you do not sign up before the expiration date, you must request a new code. [employer] Access Code: Activation code not generated  Current [employer] Status: Active (This is the date your [employer] access code will )    4. Enter the last four digits of your Social Security Number (xxxx) and Date of Birth (mm/dd/yyyy) as indicated and click Submit. You will be taken to the next sign-up page. 5. Create a [employer] ID. This will be your [employer] login ID and cannot be changed, so think of one that is secure and easy to remember. 6. Create a [employer] password. You can change your password at any time. 7. Enter your Password Reset Question and Answer. This can be used at a later time if you forget your password. 8. Enter your e-mail address. You will receive e-mail notification when new information is available in [address]. 9. Click Sign Up. You can now view and download portions of your medical record. 10. Click the Download Summary menu link to download a portable copy of your medical information. Additional Information    If you have questions, please call [phone]. Remember, [employer] is NOT to be used for urgent needs. For medical emergencies, dial 911. Educational references and/or instructions provided during this visit included:    See Attached      APPOINTMENTS:    Per MD Instruction    Discharge information has been reviewed with the patient. The patient verbalized understanding.

## 2022-09-20 DIAGNOSIS — F43.21 GRIEF REACTION: Primary | ICD-10-CM

## 2022-09-20 RX ORDER — DIAZEPAM 5 MG/1
5 TABLET ORAL
Qty: 30 TABLET | Refills: 1 | Status: SHIPPED | OUTPATIENT
Start: 2022-09-20 | End: 2022-09-27 | Stop reason: SDUPTHER

## 2022-09-20 NOTE — TELEPHONE ENCOUNTER
[facility] Val[facility]21/22 for a 15 d/s. Last Visit: 6/27/22 with MD Kevin Gregory  Next Appointment: 1/16/23 with MD Kevin Gregory  Previous Refill Encounter(s): 3/21/22 #30 with 1 refill    Requested Prescriptions     Pending Prescriptions Disp Refills    diazePAM (Valium) 5 mg tablet 30 Tablet 1     Sig: Take 1 Tablet by mouth every twelve (12) hours as needed for Anxiety. Max Daily Amount: 10 mg. For [facility] in place:   Recommendation Provided To:    Intervention Detail: New Rx: 1, reason: Patient Preference  Gap Closed?:   Intervention Accepted By:   Time Spent (min): 5

## 2022-09-26 ENCOUNTER — TELEPHONE (OUTPATIENT)
Dept: INTERNAL MEDICINE CLINIC | Age: 77
End: 2022-09-26

## 2022-09-26 NOTE — TELEPHONE ENCOUNTER
Patient calling stating on 09/20 sakina requested a refill for Diazepam to be sent to [facility] on [address] [place] [id number]  Pt said they cony not fill it until after she returned [facility]  she is asking for it to be resent to [facility] on [address] .

## 2022-09-27 RX ORDER — DIAZEPAM 5 MG/1
5 TABLET ORAL
Qty: 30 TABLET | Refills: 1 | Status: SHIPPED | OUTPATIENT
Start: 2022-09-27

## 2022-10-18 DIAGNOSIS — E78.5 DYSLIPIDEMIA: ICD-10-CM

## 2022-10-18 NOTE — TELEPHONE ENCOUNTER
Last Visit: 6/27/22 with MD Dalia Harvey  Next Appointment: 1/16/23 with MD Dalia Harvey  Previous Refill Encounter(s): 9/22/21 #90 with 3 refills    Requested Prescriptions     Pending Prescriptions Disp Refills    pravastatin (PRAVACHOL) 20 mg tablet 90 Tablet 3     Sig: Take 1 Tablet by mouth nightly. For [facility] in place:   Recommendation Provided To:    Intervention Detail: New Rx: 1, reason: Patient Preference  Gap Closed?:   Intervention Accepted By:   Time Spent (min): 5

## 2022-10-19 RX ORDER — PRAVASTATIN SODIUM 20 MG/1
20 TABLET ORAL
Qty: 90 TABLET | Refills: 3 | Status: SHIPPED | OUTPATIENT
Start: 2022-10-19

## 2022-11-14 ENCOUNTER — TRANSCRIBE ORDER (OUTPATIENT)
Dept: SCHEDULING | Age: 77
End: 2022-11-14

## 2022-11-14 DIAGNOSIS — Z12.31 VISIT FOR SCREENING MAMMOGRAM: Primary | ICD-10-CM

## 2022-11-28 NOTE — PATIENT INSTRUCTIONS
"COPD EDUCATION by COPD CLINICAL EDUCATOR  11/28/2022  at  12:46 PM by Mikaela Mei RRT     Patient interviewed by COPD education team.  Patient declined to participate in full program.  A short intervention has been conducted.  A comprehensive packet including information about COPD, types of treatments to manage their disease and safe home Oxygen usage was provided and reviewed with patient at the bedside.  Patient given a spacer with instruction. We also spent time discussing oxygen safety. Patient states back in June he had an oxygen tank blow the valve off causing him injury. He understands he can not smoke around his oxygen and states he was not smoking when the oxygen tank blew up. Since then he has been afraid to use an oxygen tank despite needing portable oxygen. We discussed other options such as portable oxygen concentrators, he does have an oxygen concentrator at home that he uses regularly.       Smoking Cessation Intervention and education completed, 5 minutes spent on smoking cessation education with patient.    Provided smoking cessation packet with \"Tips to Quit\" and brochure for \"Free Smoking Cessation Classes\".      Patient is not eligible for RPM due to [place] residence.     COPD Screen  COPD Risk Screening  Do you have a history of COPD?: Yes  Do you have a Pulmonologist?: Yes    COPD Assessment  COPD Clinical Specialists ONLY  COPD Education Initiated: Yes--Short Intervention (Given COPD and Smoking Cessation literature, spacer, virtual class info. Not eligible for RPM ([place] resident))  DME Company: [employer]  DME Equipment Type: O2 @ 3 L, no portable O2  Physician Name: Ana Flores M.D.  Pulmonologist Name: given list of local pulmonary clinics  Referrals Initiated: Yes  Pulmonary Rehab: N/A  Smoking Cessation: Yes  $ Smoking Cessation 3-10 Minutes: Symptomatic (5 min)  Palliative Care:  (not ordered, never seen)  Hospice: N/A  Home Health Care: N/A  [facility] Community Outreach: " Medicare Wellness Visit, Female The best way to live healthy is to have a lifestyle where you eat a well-balanced diet, exercise regularly, limit alcohol use, and quit all forms of tobacco/nicotine, if applicable. Regular preventive services are another way to keep healthy. Preventive services (vaccines, screening tests, monitoring & exams) can help personalize your care plan, which helps you manage your own care. Screening tests can find health problems at the earliest stages, when they are easiest to treat. [facility] follows the current, evidence-based guidelines published by the [facility] ([facility]) when recommending preventive services for our patients. Because we follow these guidelines, sometimes recommendations change over time as research supports it. (For example, mammograms used to be recommended annually. Even though Medicare will still pay for an annual mammogram, the newer guidelines recommend a mammogram every two years for women of average risk.) Of course, you and your doctor may decide to screen more often for some diseases, based on your risk and your health status. Preventive services for you include: - Medicare offers their members a free annual wellness visit, which is time for you and your primary care provider to discuss and plan for your preventive service needs. Take advantage of this benefit every year! 
-All adults over the age of 72 should receive the recommended pneumonia vaccines. Current USPSTF guidelines recommend a series of two vaccines for the best pneumonia protection.  
-All adults should have a flu vaccine yearly and a tetanus vaccine every 10 years. All adults age 61 and older should receive a shingles vaccine once in their lifetime.   
-A bone mass density test is recommended when a woman turns 65 to screen for osteoporosis. This test is only recommended one time, as a screening. "N/A  [facility]: N/A  [facility]: N/A (lives outside of service area)  Private In-Home Care Agency: N/A  Is this a COPD exacerbation patient?: No (COPD Coordinator consult)  $ Demo/Eval of SVN's, MDI's and Aerosols: Yes (spacer)    PFT Results    No results found for: PFT    Meds to Beds  Would the patient like to opt in for Bedside Medication Delivery at Discharge?: Yes, interested     MY COPD ACTION PLAN     It is recommended that patients and physicians /healthcare providers complete this action plan together. This plan should be discussed at each physician visit and updated as needed.    The green, yellow and red zones show groups of symptoms of COPD. This list of symptoms is not comprehensive, and you may experience other symptoms. In the \"Actions\" column, your healthcare provider has recommended actions for you to take based on your symptoms.    Patient Name: Phillip Ann   YOB: 1958   Last Updated on:     Green Zone:  I am doing well today Actions     Usual activitiy and exercise level   Take daily medications     Usual amounts of cough and phlegm/mucus   Use oxygen as prescribed     Sleep well at night   Continue regular exercise/diet plan     Appetite is good   At all times avoid cigarette smoke, inhaled irritants     Daily Medications (these medications are taken every day):   Fluticasone Furoate and Vilanterol trifenatate (Breo)  Umeclidinium (Incurse Ellipta) 1 Puff  1 Puff Once daily  Once daily     Additional Information:  Rinse mouth after taking    Yellow Zone:  I am having a bad day or a COPD flare Actions     More breathless than usual   Continue daily medications     I have less energy for my daily activities   Use quick relief inhaler as ordered     Increased or thicker phlegm/mucus   Use oxygen as prescribed     Using quick relief inhaler/nebulizer more often   Get plenty of rest     Swelling of ankles more than usual   Use pursed lip breathing     More " Some providers will use this same test as a disease monitoring tool if you already have osteoporosis. -All adults age 38-68 who are overweight should have a diabetes screening test once every three years.  
-Other screening tests and preventive services for persons with diabetes include: an eye exam to screen for diabetic retinopathy, a kidney function test, a foot exam, and stricter control over your cholesterol.  
-Cardiovascular screening for adults with routine risk involves an electrocardiogram (ECG) at intervals determined by your doctor.  
-Colorectal cancer screenings should be done for adults age 54-65 with no increased risk factors for colorectal cancer. There are a number of acceptable methods of screening for this type of cancer. Each test has its own benefits and drawbacks. Discuss with your doctor what is most appropriate for you during your annual wellness visit. The different tests include: colonoscopy (considered the best screening method), a fecal occult blood test, a fecal DNA test, and sigmoidoscopy. -Breast cancer screenings are recommended every other year for women of normal risk, age 54-69. 
-Cervical cancer screenings for women over age 72 are only recommended with certain risk factors.  
-All adults born between [facility] should be screened once for Hepatitis C. Here is a list of your current Health Maintenance items (your personalized list of preventive services) with a due date: 
Health Maintenance Due Topic Date Due  Shingles Vaccine (1 of 2) 12/19/1995  Flu Vaccine  08/01/2018  Glaucoma Screening   09/15/2018 [address] [place] [address] Annual Well Visit  10/22/2018 "coughing than usual   At all times avoid cigarette smoke, inhaled irritants     I feel like I have a \"chest cold\"     Poor sleep and my symptoms woke me up     My appetite is not good     My medicine is not helping      Call provider immediately if symptoms don’t improve     Continue daily medications, add rescue medications:   Albuterol 2 Puffs Every 4 hours PRN       Medications to be used during a flare up, (as Discussed with Provider):           Additional Information:  Use the spacer with your Albuterol rescue inhaler.     Red Zone:  I need urgent medical care Actions     Severe shortness of breath even at rest   Call 911 or seek medical care immediately     Not able to do any activity because of breathing      Fever or shaking chills      Feeling confused or very drowsy       Chest pains      Coughing up blood                  "

## 2022-12-09 ENCOUNTER — HOSPITAL ENCOUNTER (OUTPATIENT)
Dept: MAMMOGRAPHY | Age: 77
End: 2022-12-09
Attending: INTERNAL MEDICINE
Payer: MEDICARE

## 2022-12-09 DIAGNOSIS — Z12.31 VISIT FOR SCREENING MAMMOGRAM: ICD-10-CM

## 2022-12-09 PROCEDURE — 77063 BREAST TOMOSYNTHESIS BI: CPT

## 2023-01-07 NOTE — PROGRESS NOTES
68 y.o. female who presents for evaluation. She stopped going to the [facility] routinely due to busy schedule. She has been busy doing some board work. On her own, she stopped the amlo due to swelling and came back on the dyazide    No GI or  complaints    No sx referable to the thyroid    Denies polyuria, polydipsia, nocturia, vision change. Weight has gone back up with diet getting worse    *LAST MEDICARE WELLNESS EXAM: 3/5/14, 8/27/15, 9/20/16, 10/17/17, 10/29/18, 12/5/19, 12/28/20, 12/13/21, 1/16/23    Past Medical History:   Diagnosis Date    Allergic rhinitis     Anemia     chronic    Cataract     Chronic constipation     Dyslipidemia     10 year calculated risk score was 10.3% (7/13); 11.3% (10/14); 15.2% (3/16)    FHx: heart disease     Fibrocystic breast     neg bx x2 1970s    GERD (gastroesophageal reflux disease)     Resolved    Hypertension     Morbid obesity ([facility] [address]     peak weight 230 lbs, bmi 38.8 from 10/11; start weight 209 lbs but not doing; W - 7/19    Osteoarthritis, knee     Dr. Stephanie Alexis     Past Surgical History:   Procedure Laterality Date    COLONOSCOPY N/A 7/11/2022    Dr Megan Kwon neg    DEXA BONE DENSITY STUDY AXIAL      DEXA t score 0.2 spine, -0.2 hip (9/09); -0.1 spine, -0.2 hip (3/14)    HX APPENDECTOMY      HX BREAST BIOPSY      1967 (RT)/ 1971 (LT)    HX CATARACT REMOVAL Bilateral     HX COLONOSCOPY      Dr. Keith Odell negative 2002, Dr. Megan Kwon negative 4/3/12    HX DILATION AND CURETTAGE      x2     HX HEMORRHOIDECTOMY      HX HYSTERECTOMY      due to  leimyomata    HX PELVIC LAPAROSCOPY       Allergies   Allergen Reactions    Amlodipine Swelling    Codeine Itching     \"Loopy\"     Current Outpatient Medications   Medication Sig    pravastatin (PRAVACHOL) 20 mg tablet Take 1 Tablet by mouth nightly. diazePAM (Valium) 5 mg tablet Take 1 Tablet by mouth every twelve (12) hours as needed for Anxiety.  Max Daily Amount: 10 mg.    fluticasone propionate (FLONASE) 50 mcg/actuation nasal spray USE 2 SPRAYS IN EACH NOSTRIL DAILY PRN    triamterene-hydroCHLOROthiazide (MAXZIDE) 75-50 mg per tablet Take 1 Tablet by mouth daily. No current facility-administered medications for this visit. REVIEW OF SYSTEMS: mammo 12/22, DEXA 3/14, colo 4/22 Dr Carter Wooten, sees Dr Grayson Head  Ophtho - no vision change or eye pain  Oral - no mouth pain, tongue or tooth problems  Ears - no hearing loss, ear pain, fullness, no swallowing problems  Cardiac - no CP, PND, orthopnea, edema, palpitations or syncope  Chest - no breast masses  Resp - no wheezing, chronic coughing, dyspnea  GI - no heartburn, nausea, vomiting, change in bowel habits, bleeding, hemorrhoids  Urinary - no dysuria, hematuria, flank pain, urgency, frequency    Visit Vitals  /82   Pulse 67   Temp 97 °F (36.1 °C) (Temporal)   Resp 18   Ht 5' 5\" (1.651 m)   Wt 207 lb (93.9 kg)   SpO2 98%   BMI 34.45 kg/m²   A&O x3  Affect is appropriate. Mood stable  No apparent distress  Anicteric, no JVD, adenopathy or thyromegaly. No carotid bruits or radiated murmur  Lungs clear to auscultation, no wheezes or rales  Heart showed regular rate and rhythm. No murmur, rubs, gallops  Abdomen soft nontender, no hepatosplenomegaly or masses. Extremities without edema. Pulses 1-2+ symmetrically.      LABS  From 12/08                                        ua neg  From 7/09 showed   gluc 98,   cr 0.90,     alt 10,           chol 223, tg 75, hdl 71, ldl-c 137  From 12/11 showed                ldl-p 1525, chol 247, tg 61, hdl 78, ldl-c 157, wbc 7.6, hb 11.7, plt 265  From 6/12 showed                    ldl-p 1177, chol 238, tg 48, hdl 78, ldl-c 152,             tsh 1.81  From 1/13 showed   gluc 99,   cr 0.95, gfr 72,  alt<5,  ldl-p 1238, chol 206, tg 56, hdl 75, ldl-c 120, wbc 8.2, hb 11.4, plt 259  From 7/13 showed                chol 221, tg 72, hdl 72, ldl-c 135  From 2/14 showed   gluc 101, cr 0.78, gfr 90,                     tsh 2.04  From 10/14 showed gluc 94,   cr 1.02, gfr>60, alt 5,   hba1c 6.2, chol 231, tg 73, hdl 83, ldl-c 133, wbc 7.9, hb 11.9, plt 281  From 3/16 showed   gluc 107, cr 0.75, gfr>60, alt 11, hba1c 5.2, chol 229, tg 74, hdl 90, ldl-c 124, wbc 7.6, hb 12.0, plt 256, tsh 1.22, ua neg  From 9/16 showed   gluc 105, cr 0.89, gfr>60, alt 14,          chol 198, tg 54, hdl 93, ldl-c 94  From 4/17 showed                chol 204, tg 69, hdl 83, ldl-c 107, wbc 8.5, hb 11.5, plt 247, tsh 0.73  From 10/17 showed gluc 107, cr 0.90, gfr>60, alt 14, hba1c 5.9, chol 216, tg 68, hdl 99, ldl-c 103, wbc 6.8, hb 11.7, plt 262  From 4/18 showed   gluc 110, cr 0.81, gfr>60,     hba1c 6.1,                  tsh 0.93, ft4 1.30  From 10/18 showed gluc 111, cr 0.87, gfr>60,     hba1c 6.0, chol 201, tg 72, hdl 94, ldl-c 93  From 7/19 showed   gluc 110, cr 0.94, gfr 58,  alt 11, hba1c 6.5, chol 203, tg 77, hdl 85, ldl-c 103, wbc 6.9, hb 11.6, plt 249, tsh 1.48, ft4 1.30  From 11/19 showed gluc 104, cr 0.99, gfr 55,     hba1c 6.0,                  umar 5  From 6/20 showed                chol 202, tg 73, hdl 95, ldl-c 92,   wbc 6.7, hb 11.3, plt 258, tsh 1.18  From 12/20 showed gluc 118, cr 0.89, gfr>60, alt 13, hba1c 5.9,                  k 3.3  From 6/21 showed   gluc 113, cr 0.91, gfr 72,    hba1c 6.1, chol 200, tg 52, hdl 85, ldl-c 105, wbc 7.4, hb 12.0, plt 233, tsh 1.97  From 12/21 showed gluc 105, cr 0.89, gfr>60,     hba1c 6.1  From 6/22 showed   gluc 149, cr 0.89, gfr>60, alt 7,   hba1c 5.9, chol 218, tg 80, hdl 86, ldl-c 118, wbc 8.6, hb 11.7, plt 226, tsh 1.46  From 1/23 showed   gluc 124, cr 0.84, gfr 72,    hba1c 6.4, chol 208, tg 61, hdl 86, ldl-c 111    We reviewed the patient's labs from the last several visits to point out trends in the numbers        Patient Active Problem List   Diagnosis Code    Multinodular goiter neg biopsy 2007 Dr. Ana Felix E04.2    Dyslipidemia E78.5    IFG (impaired fasting glucose) R73.01    Essential hypertension I10    Degenerative arthritis of knee, bilateral M17.0    GERD without esophagitis K21.9    Advance directive in chart Z78.9    Severe obesity (BMI 35.0-35.9 with comorbidity) ([facility]) E66.01, Z68.35    BMI 35.0-35.9,adult Z68.35     ASSESSMENT AND PLAN:   1. Hypertension. Controlled on current  2. Obesity. See separate note  3. Allergic rhinitis. Prn antihistamines and flonase  4. Fibrocystic breasts. F/U GYN and mammos  5. DJD. Prn nsaids  6. General.  Ca/D advocated. 7.  Thyroid. Follow   8. Dyslipidemia. Discussed inc prava or changing to stronger statin, she declined and wants to do better with exercise and diet  9. IFG. Lifestyle and dietary measures as above        RTC 4/23 per her preference     Above conditions discussed at length and patient vocalized understanding. All questions answered to patient satisfaction        ICD-10-CM ICD-9-CM    1. Encounter for immunization  Z23 V03.89 PNEUMOCOCCAL, PPSV23, (AGE 2 YRS+), SC/IM      ADMIN PNEUMOCOCCAL VACCINE      2. Dyslipidemia  E78.5 272.4 pravastatin (PRAVACHOL) 20 mg tablet      3. Grief reaction  F43.21 309.0 diazePAM (Valium) 5 mg tablet      4. Essential hypertension  I10 401.9 triamterene-hydroCHLOROthiazide (MAXZIDE) 75-50 mg per tablet      5. IFG (impaired fasting glucose)  W15.27 452.80 METABOLIC PANEL, BASIC      CBC W/O DIFF      6. BMI 35.0-35.9,adult  Z68.35 V85.35       7. Severe obesity (BMI 35.0-35.9 with comorbidity) ([facility])  E66.01 278.01     Z68.35 V85.35       8.  Multinodular goiter neg biopsy 2007 Dr. Lisa Osullivan  E04.2 241.1

## 2023-01-07 NOTE — PROGRESS NOTES
This is a Subsequent Medicare Annual Wellness Visit    I have reviewed the patient's medical history in detail and updated the computerized patient record. Assessment/Plan   Education and counseling provided:  Are appropriate based on today's review and evaluation  End-of-Life planning (with patient's consent)  Pneumococcal Vaccine  Screening Mammography  Colorectal cancer screening tests  Cardiovascular screening blood test  Diabetes screening test    1. Medicare annual wellness visit, subsequent  2. Dyslipidemia  -     pravastatin (PRAVACHOL) 20 mg tablet; Take 1 Tablet by mouth nightly., Normal, Disp-90 Tablet, R-3  3. Grief reaction  -     diazePAM (Valium) 5 mg tablet; Take 1 Tablet by mouth every twelve (12) hours as needed for Anxiety. Max Daily Amount: 10 mg., Normal, Disp-30 Tablet, R-1  4. Essential hypertension  -     triamterene-hydroCHLOROthiazide (MAXZIDE) 75-50 mg per tablet; Take 1 Tablet by mouth daily. , Normal, Disp-90 Tablet, R-3  5. Encounter for immunization  -     PNEUMOCOCCAL, PPSV23, (AGE 2 YRS+), SC/IM  -     ADMIN PNEUMOCOCCAL VACCINE  6. IFG (impaired fasting glucose)  -     METABOLIC PANEL, BASIC; Future  -     CBC W/O DIFF; Future  7. BMI 35.0-35.9,adult  -     FL BEHAVIOR  OBESITY 15M  8. Severe obesity (BMI 35.0-35.9 with comorbidity) ([facility] [address]  9. Multinodular goiter neg biopsy 2007 Dr. Stoner December  10.  Advanced directives, counseling/discussion  -     ADVANCE CARE PLANNING FIRST 30 MINS       Depression Risk Factor Screening     3 most recent PHQ Screens 1/16/2023   Little interest or pleasure in doing things Not at all   Feeling down, depressed, irritable, or hopeless Not at all   Total Score PHQ 2 0       Alcohol & Drug Abuse Risk Screen    Do you average more than 1 drink per night or more than 7 drinks a week:  No    On any one occasion in the past three months have you have had more than 3 drinks containing alcohol:  No          Functional Ability and Level of Safety Hearing: Hearing is good. Activities of Daily Living: The home contains: no safety equipment. Patient does total self care      Ambulation: with no difficulty     Fall Risk:  Fall Risk Assessment, last 12 mths 1/16/2023   Able to walk? Yes   Fall in past 12 months? 0   Do you feel unsteady?  0   Are you worried about falling 0      Abuse Screen:  Patient is not abused       Cognitive Screening    Has your family/caregiver stated any concerns about your memory: no     Cognitive Screening: Normal - Clock Drawing Test    Health Maintenance Due     Health Maintenance Due   Topic Date Due    Flu Vaccine (1) 08/01/2022    Depression Screen  12/13/2022       Patient Care Team   Patient Care Team:  Hayes Reardon MD as PCP - General (Internal Medicine Physician)  Hayes Reardon MD as PCP - [facility] Empaneled Provider    History     Patient Active Problem List   Diagnosis Code    Multinodular goiter neg biopsy 2007 Dr. Sergio Jacob E04.2    Dyslipidemia E78.5    IFG (impaired fasting glucose) R73.01    Essential hypertension I10    Degenerative arthritis of knee, bilateral M17.0    GERD without esophagitis K21.9    Advance directive in chart Z78.9    Severe obesity (BMI 35.0-35.9 with comorbidity) ([facility] [address] E66.01, Z68.35    BMI 35.0-35.9,adult Z68.35     Past Medical History:   Diagnosis Date    Allergic rhinitis     Anemia     chronic    Cataract     Chronic constipation     Dyslipidemia     10 year calculated risk score was 10.3% (7/13); 11.3% (10/14); 15.2% (3/16)    FHx: heart disease     Fibrocystic breast     neg bx x2 1970s    GERD (gastroesophageal reflux disease)     Resolved    Hypertension     Morbid obesity (H[facility])     peak weight 230 lbs, bmi 38.8 from 10/11; start weight 209 lbs but not doing; W - 7/19    Osteoarthritis, knee     Dr. Andrew Ram      Past Surgical History:   Procedure Laterality Date    COLONOSCOPY N/A 7/11/2022    Dr Jaja clinton    DEXA BONE DENSITY STUDY AXIAL      DEXA t score 0.2 spine, -0.2 hip (9/09); -0.1 spine, -0.2 hip (3/14)    HX APPENDECTOMY      HX BREAST BIOPSY      1967 (RT)/ 1971 (LT)    HX CATARACT REMOVAL Bilateral     HX COLONOSCOPY      Dr. Parmjit Dawkins negative 2002, Dr. Maria Esther Nogueira negative 4/3/12    HX DILATION AND CURETTAGE      x2     HX HEMORRHOIDECTOMY      HX HYSTERECTOMY      due to  leimyomata    HX PELVIC LAPAROSCOPY       Current Outpatient Medications   Medication Sig Dispense Refill    pravastatin (PRAVACHOL) 20 mg tablet Take 1 Tablet by mouth nightly. 90 Tablet 3    diazePAM (Valium) 5 mg tablet Take 1 Tablet by mouth every twelve (12) hours as needed for Anxiety. Max Daily Amount: 10 mg. 30 Tablet 1    fluticasone propionate (FLONASE) 50 mcg/actuation nasal spray USE 2 SPRAYS IN EACH NOSTRIL DAILY PRN 48 g 3    triamterene-hydroCHLOROthiazide (MAXZIDE) 75-50 mg per tablet Take 1 Tablet by mouth daily.  90 Tablet 3     Allergies   Allergen Reactions    Amlodipine Swelling    Codeine Itching     \"Loopy\"       Family History   Problem Relation Age of Onset    Hypertension Mother     Hypertension Father     Cancer Father         pancreatic    Hypertension Brother     Cancer Brother 46        lung    Hypertension Sister     Diabetes Brother     Heart Disease Sister      Social History     Tobacco Use    Smoking status: Never    Smokeless tobacco: Never   Substance Use Topics    Alcohol use: Never         Danielito Monroy MD

## 2023-01-09 ENCOUNTER — APPOINTMENT (OUTPATIENT)
Dept: INTERNAL MEDICINE CLINIC | Age: 78
End: 2023-01-09

## 2023-01-09 ENCOUNTER — HOSPITAL ENCOUNTER (OUTPATIENT)
Dept: LAB | Age: 78
Discharge: HOME | End: 2023-01-09

## 2023-01-09 LAB — XX-LABCORP SPECIMEN COL,LCBCF: NORMAL

## 2023-01-09 PROCEDURE — 99001 SPECIMEN HANDLING PT-LAB: CPT

## 2023-01-09 NOTE — PROGRESS NOTES
Yg Gar presents today for   Chief Complaint   Patient presents with    Annual Wellness Visit    Labs     1-9-23    Hypertension     Essential    Cholesterol Problem     Dyslipidemia     Blood sugar problem     IFG       1. \"Have you been to the ER, urgent care clinic since your last visit? Hospitalized since your last visit? \" Yes   7-11-22 @ [address] - Colonoscopy     2. \"Have you seen or consulted any other health care providers outside of the [address] [place] [address] since your last visit? \" yes     3. For patients aged 39-70: Has the patient had a colonoscopy / FIT/ Cologuard? NA - based on age      If the patient is female:    4. For patients aged 41-77: Has the patient had a mammogram within the past 2 years? NA - based on age or sex  See top three    5. For patients aged 21-65: Has the patient had a pap smear? NA - based on age or sex    Yg Gar 1945 female who presents for routine immunizations. Patient denies any symptoms , reactions or allergies that would exclude them from being immunized today. Risks and adverse reactions were discussed and the VIS was given to them. All questions were addressed. Order placed for Pneumovax 23,  per Verbal Order from Dr. Edyta Varma with read back. Patient was observed for 15 min post injection. There were no reactions observed.     Vel Choi, CMA

## 2023-01-16 ENCOUNTER — OFFICE VISIT (OUTPATIENT)
Dept: INTERNAL MEDICINE CLINIC | Age: 78
End: 2023-01-16
Payer: MEDICARE

## 2023-01-16 VITALS
OXYGEN SATURATION: 98 % | BODY MASS INDEX: 34.49 KG/M2 | WEIGHT: 207 LBS | RESPIRATION RATE: 18 BRPM | DIASTOLIC BLOOD PRESSURE: 82 MMHG | HEIGHT: 65 IN | HEART RATE: 67 BPM | TEMPERATURE: 97 F | SYSTOLIC BLOOD PRESSURE: 124 MMHG

## 2023-01-16 DIAGNOSIS — I10 ESSENTIAL HYPERTENSION: ICD-10-CM

## 2023-01-16 DIAGNOSIS — Z23 ENCOUNTER FOR IMMUNIZATION: ICD-10-CM

## 2023-01-16 DIAGNOSIS — Z00.00 MEDICARE ANNUAL WELLNESS VISIT, SUBSEQUENT: Primary | ICD-10-CM

## 2023-01-16 DIAGNOSIS — E78.5 DYSLIPIDEMIA: ICD-10-CM

## 2023-01-16 DIAGNOSIS — Z71.89 ADVANCED DIRECTIVES, COUNSELING/DISCUSSION: ICD-10-CM

## 2023-01-16 DIAGNOSIS — E66.01 SEVERE OBESITY (BMI 35.0-35.9 WITH COMORBIDITY) (HCC): ICD-10-CM

## 2023-01-16 DIAGNOSIS — E04.2 MULTINODULAR GOITER: ICD-10-CM

## 2023-01-16 DIAGNOSIS — R73.01 IFG (IMPAIRED FASTING GLUCOSE): ICD-10-CM

## 2023-01-16 DIAGNOSIS — F43.21 GRIEF REACTION: ICD-10-CM

## 2023-01-16 PROCEDURE — G8536 NO DOC ELDER MAL SCRN: HCPCS | Performed by: INTERNAL MEDICINE

## 2023-01-16 PROCEDURE — 3074F SYST BP LT 130 MM HG: CPT | Performed by: INTERNAL MEDICINE

## 2023-01-16 PROCEDURE — 1123F ACP DISCUSS/DSCN MKR DOCD: CPT | Performed by: INTERNAL MEDICINE

## 2023-01-16 PROCEDURE — 99497 ADVNCD CARE PLAN 30 MIN: CPT | Performed by: INTERNAL MEDICINE

## 2023-01-16 PROCEDURE — G8427 DOCREV CUR MEDS BY ELIG CLIN: HCPCS | Performed by: INTERNAL MEDICINE

## 2023-01-16 PROCEDURE — G8399 PT W/DXA RESULTS DOCUMENT: HCPCS | Performed by: INTERNAL MEDICINE

## 2023-01-16 PROCEDURE — 1101F PT FALLS ASSESS-DOCD LE1/YR: CPT | Performed by: INTERNAL MEDICINE

## 2023-01-16 PROCEDURE — 1090F PRES/ABSN URINE INCON ASSESS: CPT | Performed by: INTERNAL MEDICINE

## 2023-01-16 PROCEDURE — 3079F DIAST BP 80-89 MM HG: CPT | Performed by: INTERNAL MEDICINE

## 2023-01-16 PROCEDURE — G8510 SCR DEP NEG, NO PLAN REQD: HCPCS | Performed by: INTERNAL MEDICINE

## 2023-01-16 PROCEDURE — G0009 ADMIN PNEUMOCOCCAL VACCINE: HCPCS | Performed by: INTERNAL MEDICINE

## 2023-01-16 PROCEDURE — G0439 PPPS, SUBSEQ VISIT: HCPCS | Performed by: INTERNAL MEDICINE

## 2023-01-16 PROCEDURE — G8417 CALC BMI ABV UP PARAM F/U: HCPCS | Performed by: INTERNAL MEDICINE

## 2023-01-16 PROCEDURE — 99214 OFFICE O/P EST MOD 30 MIN: CPT | Performed by: INTERNAL MEDICINE

## 2023-01-16 PROCEDURE — 90732 PPSV23 VACC 2 YRS+ SUBQ/IM: CPT | Performed by: INTERNAL MEDICINE

## 2023-01-16 RX ORDER — AMLODIPINE BESYLATE 5 MG/1
5 TABLET ORAL DAILY
Qty: 90 TABLET | Refills: 1 | Status: CANCELLED | OUTPATIENT
Start: 2023-01-16

## 2023-01-16 RX ORDER — TRIAMTERENE AND HYDROCHLOROTHIAZIDE 75; 50 MG/1; MG/1
1 TABLET ORAL DAILY
Qty: 90 TABLET | Refills: 3 | Status: SHIPPED | OUTPATIENT
Start: 2023-01-16

## 2023-01-16 RX ORDER — DIAZEPAM 5 MG/1
5 TABLET ORAL
Qty: 30 TABLET | Refills: 1 | Status: SHIPPED | OUTPATIENT
Start: 2023-01-16

## 2023-01-16 RX ORDER — PRAVASTATIN SODIUM 20 MG/1
20 TABLET ORAL
Qty: 90 TABLET | Refills: 3 | Status: SHIPPED | OUTPATIENT
Start: 2023-01-16

## 2023-01-16 RX ORDER — FLUTICASONE PROPIONATE 50 MCG
SPRAY, SUSPENSION (ML) NASAL
Qty: 48 G | Refills: 3 | Status: SHIPPED | OUTPATIENT
Start: 2023-01-16

## 2023-01-16 NOTE — ACP (ADVANCE CARE PLANNING)
Advance Care Planning     Advance Care Planning (ACP) Physician/NP/PA Conversation      Date of Conversation: 1/16/2023  Conducted with: Patient with Decision Making Capacity    Healthcare Decision Maker:     Primary Decision Maker: Tim Butler - Spouse - [phone]  Click here to complete [address] including selection of the Healthcare Decision Maker Relationship (ie \"Primary\")      Today we documented Decision Maker(s) consistent with Legal Next of Kin hierarchy. Care Preferences:    Hospitalization: \"If your health worsens and it becomes clear that your chance of recovery is unlikely, what would be your preference regarding hospitalization? \"  The patient would prefer hospitalization. Ventilation: \"If you were unable to breathe on your own and your chance of recovery was unlikely, what would be your preference about the use of a ventilator (breathing machine) if it was available to you? \"   The patient would desire the use of a ventilator. Resuscitation: \"In the event your heart stopped as a result of an underlying serious health condition, would you want attempts to be made to restart your heart, or would you prefer a natural death? \"   Yes, attempt to resuscitate.     Additional topics discussed: ventilation preferences, hospitalization preferences, and resuscitation preferences    Conversation Outcomes / Follow-Up Plan:   ACP in process - information provided, considering goals and options  Reviewed DNR/DNI and patient elects Full Code (Attempt Resuscitation)     Length of Voluntary ACP Conversation in minutes:  16 minutes    Dick Wade MD

## 2023-01-16 NOTE — PATIENT INSTRUCTIONS
Vaccine Information Statement    Pneumococcal Polysaccharide Vaccine (PPSV23): What You Need to Know    Many Vaccine Information Statements are available in [place] and other languages. See www.immunize.org/vis  Hojas de información sobre vacunas están disponibles en español y en muchos otros idiomas. Visite www.immunize.org/vis    1. Why get vaccinated? Pneumococcal polysaccharide vaccine (PPSV23) can prevent pneumococcal disease. Pneumococcal disease refers to any illness caused by pneumococcal bacteria. These bacteria can cause many types of illnesses, including pneumonia, which is an infection of the lungs. Pneumococcal bacteria are one of the most common causes of pneumonia. Besides pneumonia, pneumococcal bacteria can also cause:  Ear infections  Sinus infections  Meningitis (infection of the tissue covering the brain and spinal cord)  Bacteremia (bloodstream infection)    Anyone can get pneumococcal disease, but children under 3years of age, people with certain medical conditions, adults 72 years or older, and cigarette smokers are at the highest risk. Most pneumococcal infections are mild. However, some can result in long-term problems, such as brain damage or hearing loss. Meningitis, bacteremia, and pneumonia caused by pneumococcal disease can be fatal.     2. PPSV23     PPSV23 protects against 23 types of bacteria that cause pneumococcal disease. PPSV23 is recommended for: All adults 72 years or older,  Anyone 2 years or older with certain medical conditions that can lead to an increased risk for pneumococcal disease. Most people need only one dose of PPSV23. A second dose of PPSV23, and another type of pneumococcal vaccine called PCV13, are recommended for certain high-risk groups. Your health care provider can give you more information.     People 65 years or older should get a dose of PPSV23 even if they have already gotten one or more doses of the vaccine before they turned 72.    3. Talk with your health care provider    Tell your vaccine provider if the person getting the vaccine:  Has had an allergic reaction after a previous dose of PPSV23, or has any severe, life-threatening allergies. In some cases, your health care provider may decide to postpone PPSV23 vaccination to a future visit. People with minor illnesses, such as a cold, may be vaccinated. People who are moderately or severely ill should usually wait until they recover before getting PPSV23. Your health care provider can give you more information. 4. Risks of a vaccine reaction    Redness or pain where the shot is given, feeling tired, fever, or muscle aches can happen after PPSV23. People sometimes faint after medical procedures, including vaccination. Tell your provider if you feel dizzy or have vision changes or ringing in the ears. As with any medicine, there is a very remote chance of a vaccine causing a severe allergic reaction, other serious injury, or death. 5. What if there is a serious problem? An allergic reaction could occur after the vaccinated person leaves the clinic. If you see signs of a severe allergic reaction (hives, swelling of the face and throat, difficulty breathing, a fast heartbeat, dizziness, or weakness), call 9-1-1 and get the person to the nearest hospital.    For other signs that concern you, call your health care provider. Adverse reactions should be reported to the Vaccine Adverse Event Reporting System (VAERS). Your health care provider will usually file this report, or you can do it yourself. Visit the VAERS website at www.vaers. hhs.gov or call 8-409.555.6821. VAERS is only for reporting reactions, and VAERS staff do not give medical advice. 6. How can I learn more? Ask your health care provider. Call your local or state health department. Contact the Centers for Disease Control and Prevention (CDC):   Call 2-998.647.4313 (1-800-CDC-INFO) or  Visit CDCs website at www.cdc.gov/vaccines    Vaccine Information Statement   PPSV23   10/30/2019    [facility] and [facility] for Disease Control and Prevention    Office Use Only      Medicare Wellness Visit, Female     The best way to live healthy is to have a lifestyle where you eat a well-balanced diet, exercise regularly, limit alcohol use, and quit all forms of tobacco/nicotine, if applicable. Regular preventive services are another way to keep healthy. Preventive services (vaccines, screening tests, monitoring & exams) can help personalize your care plan, which helps you manage your own care. Screening tests can find health problems at the earliest stages, when they are easiest to treat. [facility] follows the current, evidence-based guidelines published by the [facility] ([facility]) when recommending preventive services for our patients. Because we follow these guidelines, sometimes recommendations change over time as research supports it. (For example, mammograms used to be recommended annually. Even though Medicare will still pay for an annual mammogram, the newer guidelines recommend a mammogram every two years for women of average risk). Of course, you and your doctor may decide to screen more often for some diseases, based on your risk and your co-morbidities (chronic disease you are already diagnosed with). Preventive services for you include:  - Medicare offers their members a free annual wellness visit, which is time for you and your primary care provider to discuss and plan for your preventive service needs.  Take advantage of this benefit every year!    -Over the age of 72 should receive the recommended pneumonia vaccines.    -All adults should have a flu vaccine yearly.  -All adults should have a tetanus vaccine every 10 years.   -Over the age 48 should receive the shingles vaccines.        -All adults should be screened once for Hepatitis C.  -All adults age 38-68 who are overweight should have a diabetes screening test once every three years.   -Other screening tests and preventive services for persons with diabetes include: an eye exam to screen for diabetic retinopathy, a kidney function test, a foot exam, and stricter control over your cholesterol.   -Cardiovascular screening for adults with routine risk involves an electrocardiogram (ECG) at intervals determined by your doctor.     -Colorectal cancer screenings should be done for adults age 39-70 with no increased risk factors for colorectal cancer. There are a number of acceptable methods of screening for this type of cancer. Each test has its own benefits and drawbacks. Discuss with your doctor what is most appropriate for you during your annual wellness visit. The different tests include: colonoscopy (considered the best screening method), a fecal occult blood test, a fecal DNA test, and sigmoidoscopy.    -Lung cancer screening is recommended annually with a low dose CT scan for adults between age 54 and 68, who have smoked at least 30 pack years (equivalent of 1 pack per day for 30 days), and who is a current smoker or quit less than 15 years ago.    -A bone mass density test is recommended when a woman turns 65 to screen for osteoporosis. This test is only recommended one time, as a screening. Some providers will use this same test as a disease monitoring tool if you already have osteoporosis. -Breast cancer screenings are recommended every other year for women of normal risk, age 54-69.    -Cervical cancer screenings for women over age 72 are only recommended with certain risk factors.      Here is a list of your current Health Maintenance items (your personalized list of preventive services) with a due date:  Health Maintenance Due   Topic Date Due    Yearly Flu Vaccine (1) 08/01/2022    Depresssion Screening  12/13/2022

## 2023-01-16 NOTE — PROGRESS NOTES
Discussion about weight loss    Body mass index is 34.45 kg/m². Vitals 1/16/2023   Weight 207 lb   SpO2 98   BSA 2.08 m2   BMI 34.45 kg/m2     Vitals 6/27/2022 12/13/2021   Weight 197 lb 210 lb   SpO2 98 100   BSA 2.02 m2 2.09 m2   BMI 32.78 kg/m2 34.95 kg/m2     Discussion about modifying behaviors regarding diet and exercise undertaken    Increase activity as tolerated   - she will start back doing the exercises at the [facility]    Cut back carbs and fatty foods.     Calorie counting would be ideal   - portions and carbs have been an issue so will work on that    Option of appetite suppressants discussed briefly and declined   - due to concerns about sfx and cost    Discussed sending to nutritionist for further teaching    - she had gone for the free classes    Intermittent fasting discussed at length previously but unable to do    We reiterated target of up to 5% wt loss as being realistic over the next 6-12 months and possibly aiming for higher than that in the future     Will come back for reevaluation and further management at subsequent visits which will be determined by patient response    Total time 8-15 min

## 2023-02-04 DIAGNOSIS — R73.01 IFG (IMPAIRED FASTING GLUCOSE): Primary | ICD-10-CM

## 2023-02-05 DIAGNOSIS — R73.01 IFG (IMPAIRED FASTING GLUCOSE): Primary | ICD-10-CM

## 2023-02-23 ENCOUNTER — TELEPHONE (OUTPATIENT)
Age: 78
End: 2023-02-23

## 2023-02-23 NOTE — TELEPHONE ENCOUNTER
Patient wants to participate in a study through [facility] on alzheimer's and the effects it has on the black community. She is asking to speak to Dr. Luis Enrique Murillo before she commits to it.

## 2023-02-24 NOTE — TELEPHONE ENCOUNTER
Whether or not to participate in a study is entirely up to the pt - how much do they want to help advance the scientific knowledge of all humanity    Without knowing what drug they are studying, most are safe to use and I'm sure that they will go over the various expected sfx    She will have to decide if she is willing to take the risk of the study drug  Since it's a study drug, by definition I can't give her guidance as I'm not familiar with it    They can give her sedative prior to [facility]

## 2023-02-24 NOTE — TELEPHONE ENCOUNTER
Patient wanted to know what Dr. Albert Bonilla thoughts about being in a study for alzheimer's, because a lot of  americans die from alzheimer's disease. Patient states the study will be conducted through [facility], but she is afraid of having an MRI because she is claustrophobic. Patient wants to know Dr. Albert Bonilla would recommend her doing the study.

## 2023-07-17 ENCOUNTER — NURSE ONLY (OUTPATIENT)
Age: 78
End: 2023-07-17

## 2023-07-17 DIAGNOSIS — R73.01 IFG (IMPAIRED FASTING GLUCOSE): ICD-10-CM

## 2023-07-18 LAB
BASOPHILS # BLD AUTO: 0 X10E3/UL (ref 0–0.2)
BASOPHILS NFR BLD AUTO: 0 %
BUN SERPL-MCNC: 15 MG/DL (ref 8–27)
BUN/CREAT SERPL: 19 (ref 12–28)
CHLORIDE SERPL-SCNC: 101 MMOL/L (ref 96–106)
CO2 SERPL-SCNC: 26 MMOL/L (ref 20–29)
CREAT SERPL-MCNC: 0.8 MG/DL (ref 0.57–1)
EGFRCR SERPLBLD CKD-EPI 2021: 76 ML/MIN/1.73
EOSINOPHIL # BLD AUTO: 0.2 X10E3/UL (ref 0–0.4)
EOSINOPHIL NFR BLD AUTO: 4 %
ERYTHROCYTE [DISTWIDTH] IN BLOOD BY AUTOMATED COUNT: 12.6 % (ref 11.7–15.4)
GLUCOSE SERPL-MCNC: 124 MG/DL (ref 70–99)
HCT VFR BLD AUTO: 35.1 % (ref 34–46.6)
HGB BLD-MCNC: 11.5 G/DL (ref 11.1–15.9)
IMM GRANULOCYTES # BLD AUTO: 0 X10E3/UL (ref 0–0.1)
IMM GRANULOCYTES NFR BLD AUTO: 0 %
LYMPHOCYTES # BLD AUTO: 2.3 X10E3/UL (ref 0.7–3.1)
LYMPHOCYTES NFR BLD AUTO: 34 %
MCH RBC QN AUTO: 29.1 PG (ref 26.6–33)
MCHC RBC AUTO-ENTMCNC: 32.8 G/DL (ref 31.5–35.7)
MCV RBC AUTO: 89 FL (ref 79–97)
MONOCYTES # BLD AUTO: 0.4 X10E3/UL (ref 0.1–0.9)
MONOCYTES NFR BLD AUTO: 6 %
NEUTROPHILS # BLD AUTO: 3.7 X10E3/UL (ref 1.4–7)
NEUTROPHILS NFR BLD AUTO: 56 %
PLATELET # BLD AUTO: 237 X10E3/UL (ref 150–450)
POTASSIUM SERPL-SCNC: 3.4 MMOL/L (ref 3.5–5.2)
RBC # BLD AUTO: 3.95 X10E6/UL (ref 3.77–5.28)
SODIUM SERPL-SCNC: 143 MMOL/L (ref 134–144)
SPECIMEN STATUS REPORT: NORMAL
WBC # BLD AUTO: 6.6 X10E3/UL (ref 3.4–10.8)

## 2023-07-20 NOTE — PROGRESS NOTES
68 y.o. female who presents for evaluation. 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 They had a conference in [place] and she 'walked all over the place' without any problems    No GI or  complaints    No sx referable to the thyroid    Denies polyuria, polydipsia, nocturia, vision change. We talked about seeing a nutritionist    Vitals 7/25/2023 1/16/2023 7/11/2022 7/11/2022 7/11/2022   Weight 207 lb 2 oz 207 lb        Vitals 7/11/2022 7/11/2022 7/6/2022   Weight  212 lb 210 lb     *LAST MEDICARE WELLNESS EXAM: 3/5/14, 8/27/15, 9/20/16, 10/17/17, 10/29/18, 12/5/19, 12/28/20, 12/13/21, 1/16/23    Past Medical History:   Diagnosis Date    Allergic rhinitis     Anemia     chronic    Chronic constipation     Dyslipidemia     10 year calculated risk score was 10.3% (7/13); 11.3% (10/14); 15.2% (3/16)    FHx: heart disease     Fibrocystic breast     neg bx x2 1970s    GERD (gastroesophageal reflux disease)     Hypertension     IFG (impaired fasting glucose) 10/31/2014    Morbid obesity ([address])     peak weight 230 lbs, bmi 38.8 from 10/11; start weight 209 lbs but not doing; W - 7/19    Multinodular goiter     neg bx Dr Lacey Beaver 2007    Osteoarthritis, knee     Dr. Juli Schneiderf    Varicose vein of leg 2023     Past Surgical History:   Procedure Laterality Date    APPENDECTOMY      BREAST BIOPSY Bilateral     1967 (RT)/ 1971 (LT)    CATARACT REMOVAL Bilateral     COLONOSCOPY      Dr. Addis Prescott neg (2002);  Dr. Jessica Rodas negative (4/3/12)    COLONOSCOPY N/A 7/11/2022    Dr Jessica Rodas neg    DEXA BONE DENSITY AXIAL SKELETON      DEXA t score 0.2 spine, -0.2 hip (9/09); -0.1 spine, -0.2 hip (3/14)    DILATION AND CURETTAGE OF UTERUS      x2     HEMORRHOID SURGERY      HYSTERECTOMY (CERVIX STATUS UNKNOWN)      due to  [address] History     Socioeconomic History    Marital status:      Spouse name: Not on file    Number of children: Not on file    Years of

## 2023-07-25 ENCOUNTER — TELEPHONE (OUTPATIENT)
Age: 78
End: 2023-07-25

## 2023-07-25 ENCOUNTER — OFFICE VISIT (OUTPATIENT)
Age: 78
End: 2023-07-25
Payer: MEDICARE

## 2023-07-25 VITALS
OXYGEN SATURATION: 98 % | HEIGHT: 65 IN | RESPIRATION RATE: 18 BRPM | WEIGHT: 207.13 LBS | BODY MASS INDEX: 34.51 KG/M2 | SYSTOLIC BLOOD PRESSURE: 118 MMHG | HEART RATE: 72 BPM | DIASTOLIC BLOOD PRESSURE: 84 MMHG | TEMPERATURE: 97.2 F

## 2023-07-25 DIAGNOSIS — E66.01 SEVERE OBESITY (BMI 35.0-35.9 WITH COMORBIDITY) (HCC): ICD-10-CM

## 2023-07-25 DIAGNOSIS — R73.01 IFG (IMPAIRED FASTING GLUCOSE): ICD-10-CM

## 2023-07-25 DIAGNOSIS — I10 ESSENTIAL HYPERTENSION: Primary | ICD-10-CM

## 2023-07-25 DIAGNOSIS — E78.5 DYSLIPIDEMIA: ICD-10-CM

## 2023-07-25 DIAGNOSIS — E04.2 MULTINODULAR GOITER: ICD-10-CM

## 2023-07-25 PROCEDURE — 99214 OFFICE O/P EST MOD 30 MIN: CPT | Performed by: INTERNAL MEDICINE

## 2023-07-25 PROCEDURE — 3074F SYST BP LT 130 MM HG: CPT | Performed by: INTERNAL MEDICINE

## 2023-07-25 PROCEDURE — 99211 OFF/OP EST MAY X REQ PHY/QHP: CPT | Performed by: INTERNAL MEDICINE

## 2023-07-25 PROCEDURE — 1123F ACP DISCUSS/DSCN MKR DOCD: CPT | Performed by: INTERNAL MEDICINE

## 2023-07-25 PROCEDURE — 3079F DIAST BP 80-89 MM HG: CPT | Performed by: INTERNAL MEDICINE

## 2023-07-25 RX ORDER — TRIAMTERENE AND HYDROCHLOROTHIAZIDE 75; 50 MG/1; MG/1
1 TABLET ORAL DAILY
COMMUNITY
Start: 2023-01-16

## 2023-07-25 SDOH — ECONOMIC STABILITY: INCOME INSECURITY: HOW HARD IS IT FOR YOU TO PAY FOR THE VERY BASICS LIKE FOOD, HOUSING, MEDICAL CARE, AND HEATING?: NOT HARD AT ALL

## 2023-07-25 SDOH — ECONOMIC STABILITY: HOUSING INSECURITY
IN THE LAST 12 MONTHS, WAS THERE A TIME WHEN YOU DID NOT HAVE A STEADY PLACE TO SLEEP OR SLEPT IN A SHELTER (INCLUDING NOW)?: NO

## 2023-07-25 SDOH — ECONOMIC STABILITY: FOOD INSECURITY: WITHIN THE PAST 12 MONTHS, YOU WORRIED THAT YOUR FOOD WOULD RUN OUT BEFORE YOU GOT MONEY TO BUY MORE.: NEVER TRUE

## 2023-07-25 SDOH — ECONOMIC STABILITY: FOOD INSECURITY: WITHIN THE PAST 12 MONTHS, THE FOOD YOU BOUGHT JUST DIDN'T LAST AND YOU DIDN'T HAVE MONEY TO GET MORE.: NEVER TRUE

## 2023-07-25 NOTE — TELEPHONE ENCOUNTER
Patient was returning call she states was from this morning from the office. She thought maybe it was the nurse. I did not see a note. Please call patient back if trying to reach them.

## 2023-11-21 ENCOUNTER — TRANSCRIBE ORDERS (OUTPATIENT)
Facility: HOSPITAL | Age: 78
End: 2023-11-21

## 2023-11-21 DIAGNOSIS — Z12.31 VISIT FOR SCREENING MAMMOGRAM: Primary | ICD-10-CM

## 2023-12-12 ENCOUNTER — HOSPITAL ENCOUNTER (OUTPATIENT)
Facility: HOSPITAL | Age: 78
Discharge: HOME | End: 2023-12-15
Attending: INTERNAL MEDICINE
Payer: MEDICARE

## 2023-12-12 VITALS — BODY MASS INDEX: 34.47 KG/M2 | HEIGHT: 65 IN

## 2023-12-12 DIAGNOSIS — Z12.31 VISIT FOR SCREENING MAMMOGRAM: ICD-10-CM

## 2023-12-12 PROCEDURE — 77063 BREAST TOMOSYNTHESIS BI: CPT

## 2023-12-26 ENCOUNTER — TELEPHONE (OUTPATIENT)
Age: 78
End: 2023-12-26

## 2023-12-26 DIAGNOSIS — R92.8 ABNORMAL MAMMOGRAM: Primary | ICD-10-CM

## 2023-12-26 NOTE — TELEPHONE ENCOUNTER
Pt walked in office with letter stating she needs additional imaging of the breast done due findings. Letter has been scanned into chart.  Please advise once orders are placed

## 2023-12-27 RX ORDER — PRAVASTATIN SODIUM 20 MG
20 TABLET ORAL DAILY
Qty: 90 TABLET | Refills: 3 | Status: SHIPPED | OUTPATIENT
Start: 2023-12-27

## 2024-01-11 ENCOUNTER — HOSPITAL ENCOUNTER (OUTPATIENT)
Facility: HOSPITAL | Age: 79
Discharge: HOME | End: 2024-01-11
Attending: INTERNAL MEDICINE
Payer: MEDICARE

## 2024-01-11 ENCOUNTER — HOSPITAL ENCOUNTER (OUTPATIENT)
Facility: HOSPITAL | Age: 79
End: 2024-01-11
Attending: INTERNAL MEDICINE
Payer: MEDICARE

## 2024-01-11 DIAGNOSIS — R92.8 ABNORMAL MAMMOGRAM: ICD-10-CM

## 2024-01-11 PROCEDURE — G0279 TOMOSYNTHESIS, MAMMO: HCPCS

## 2024-01-22 ENCOUNTER — HOSPITAL ENCOUNTER (OUTPATIENT)
Facility: HOSPITAL | Age: 79
Setting detail: SPECIMEN
Discharge: HOME | End: 2024-01-25

## 2024-01-22 LAB — LABCORP SPECIMEN COLLECTION: NORMAL

## 2024-01-23 LAB
ALBUMIN SERPL-MCNC: 4.2 G/DL (ref 3.8–4.8)
ALBUMIN/GLOB SERPL: 1.8 {RATIO} (ref 1.2–2.2)
ALP SERPL-CCNC: 102 IU/L (ref 44–121)
ALT SERPL-CCNC: 8 IU/L (ref 0–32)
AST SERPL-CCNC: 12 IU/L (ref 0–40)
BILIRUB SERPL-MCNC: 0.4 MG/DL (ref 0–1.2)
BUN SERPL-MCNC: 18 MG/DL (ref 8–27)
BUN/CREAT SERPL: 19 (ref 12–28)
CALCIUM SERPL-MCNC: 9.5 MG/DL (ref 8.7–10.3)
CHLORIDE SERPL-SCNC: 99 MMOL/L (ref 96–106)
CHOLEST SERPL-MCNC: 212 MG/DL (ref 100–199)
CO2 SERPL-SCNC: 24 MMOL/L (ref 20–29)
CREAT SERPL-MCNC: 0.96 MG/DL (ref 0.57–1)
EGFRCR SERPLBLD CKD-EPI 2021: 61 ML/MIN/1.73
GLOBULIN SER CALC-MCNC: 2.4 G/DL (ref 1.5–4.5)
GLUCOSE SERPL-MCNC: 105 MG/DL (ref 70–99)
HBA1C MFR BLD: 6.7 % (ref 4.8–5.6)
HDLC SERPL-MCNC: 84 MG/DL
LDLC SERPL CALC-MCNC: 117 MG/DL (ref 0–99)
POTASSIUM SERPL-SCNC: 3.5 MMOL/L (ref 3.5–5.2)
PROT SERPL-MCNC: 6.6 G/DL (ref 6–8.5)
SODIUM SERPL-SCNC: 139 MMOL/L (ref 134–144)
SPECIMEN STATUS REPORT: NORMAL
T4 FREE SERPL-MCNC: 1.6 NG/DL (ref 0.82–1.77)
TRIGL SERPL-MCNC: 59 MG/DL (ref 0–149)
TSH SERPL DL<=0.005 MIU/L-ACNC: 0.8 UIU/ML (ref 0.45–4.5)
VLDLC SERPL CALC-MCNC: 11 MG/DL (ref 5–40)

## 2024-01-24 NOTE — PROGRESS NOTES
78 y.o. female who presents for evaluation.    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     No GI or  complaints    No sx referable to the thyroid    Denies polyuria, polydipsia, nocturia, vision change. She did not end up going to the nutritionist but has gotten strict with protion control, cutting out the pepsis and fried foods, weight is down some     7/11/2022 1/16/2023 7/25/2023 12/12/2023 1/29/2024   Vitals        Weight - Scale 212 lb  207 lb  207 lb 2 oz   195 lb      *LAST MEDICARE WELLNESS EXAM: 3/5/14, 8/27/15, 9/20/16, 10/17/17, 10/29/18, 12/5/19, 12/28/20, 12/13/21, 1/16/23, 1/29/24    Past Medical History:   Diagnosis Date    Allergic rhinitis     Anemia     chronic    Chronic constipation     DM (diabetes mellitus) (HCC) 01/2024    on basis of a1c    Dyslipidemia     10 year calculated risk score was 10.3% (7/13); 11.3% (10/14); 15.2% (3/16)    FHx: heart disease     Fibrocystic breast     neg bx x2 1970s    GERD (gastroesophageal reflux disease)     Hypertension     IFG (impaired fasting glucose) 10/31/2014    Morbid obesity (HCC)     peak weight 230 lbs, bmi 38.8 from 10/11; IF start weight 209 lbs but not doing    Multinodular goiter     neg bx Dr Sue 2007    Osteoarthritis, knee     Dr. Nava    Varicose vein of leg 2023     Past Surgical History:   Procedure Laterality Date    APPENDECTOMY      BREAST BIOPSY Bilateral     1967 (RT)/ 1971 (LT)    CATARACT REMOVAL Bilateral     COLONOSCOPY      Dr. Mendoza neg (2002); Dr. Joshi negative (4/3/12)    COLONOSCOPY N/A 7/11/2022    Dr Joshi neg    DEXA BONE DENSITY AXIAL SKELETON      DEXA t score 0.2 spine, -0.2 hip (9/09); -0.1 spine, -0.2 hip (3/14)    DILATION AND CURETTAGE OF UTERUS      x2     HEMORRHOID SURGERY      HYSTERECTOMY (CERVIX STATUS UNKNOWN)      due to  [place]    PELVIC LAPAROSCOPY       Social History     Socioeconomic History    Marital status:      Spouse name: Not on

## 2024-01-29 ENCOUNTER — OFFICE VISIT (OUTPATIENT)
Age: 79
End: 2024-01-29
Payer: MEDICARE

## 2024-01-29 VITALS
OXYGEN SATURATION: 99 % | DIASTOLIC BLOOD PRESSURE: 63 MMHG | WEIGHT: 195 LBS | TEMPERATURE: 97.2 F | SYSTOLIC BLOOD PRESSURE: 111 MMHG | BODY MASS INDEX: 32.49 KG/M2 | HEIGHT: 65 IN | RESPIRATION RATE: 16 BRPM | HEART RATE: 76 BPM

## 2024-01-29 DIAGNOSIS — K21.9 GERD WITHOUT ESOPHAGITIS: ICD-10-CM

## 2024-01-29 DIAGNOSIS — I10 ESSENTIAL HYPERTENSION: ICD-10-CM

## 2024-01-29 DIAGNOSIS — Z71.89 ADVANCED CARE PLANNING/COUNSELING DISCUSSION: ICD-10-CM

## 2024-01-29 DIAGNOSIS — E11.9 TYPE 2 DIABETES MELLITUS WITHOUT COMPLICATION, WITHOUT LONG-TERM CURRENT USE OF INSULIN (HCC): ICD-10-CM

## 2024-01-29 DIAGNOSIS — Z00.00 MEDICARE ANNUAL WELLNESS VISIT, SUBSEQUENT: Primary | ICD-10-CM

## 2024-01-29 DIAGNOSIS — E04.2 MULTINODULAR GOITER: ICD-10-CM

## 2024-01-29 DIAGNOSIS — F41.9 ANXIETY: ICD-10-CM

## 2024-01-29 DIAGNOSIS — E78.5 DYSLIPIDEMIA: ICD-10-CM

## 2024-01-29 PROCEDURE — 3074F SYST BP LT 130 MM HG: CPT | Performed by: INTERNAL MEDICINE

## 2024-01-29 PROCEDURE — 3044F HG A1C LEVEL LT 7.0%: CPT | Performed by: INTERNAL MEDICINE

## 2024-01-29 PROCEDURE — 99497 ADVNCD CARE PLAN 30 MIN: CPT | Performed by: INTERNAL MEDICINE

## 2024-01-29 PROCEDURE — 1123F ACP DISCUSS/DSCN MKR DOCD: CPT | Performed by: INTERNAL MEDICINE

## 2024-01-29 PROCEDURE — 99215 OFFICE O/P EST HI 40 MIN: CPT | Performed by: INTERNAL MEDICINE

## 2024-01-29 PROCEDURE — 3078F DIAST BP <80 MM HG: CPT | Performed by: INTERNAL MEDICINE

## 2024-01-29 PROCEDURE — G0439 PPPS, SUBSEQ VISIT: HCPCS | Performed by: INTERNAL MEDICINE

## 2024-01-29 RX ORDER — DIAZEPAM 5 MG/1
5 TABLET ORAL EVERY 8 HOURS PRN
Qty: 60 TABLET | Refills: 0 | Status: CANCELLED | OUTPATIENT
Start: 2024-01-29

## 2024-01-29 ASSESSMENT — LIFESTYLE VARIABLES
HOW OFTEN DO YOU HAVE A DRINK CONTAINING ALCOHOL: NEVER
HOW MANY STANDARD DRINKS CONTAINING ALCOHOL DO YOU HAVE ON A TYPICAL DAY: PATIENT DOES NOT DRINK

## 2024-01-29 ASSESSMENT — PATIENT HEALTH QUESTIONNAIRE - PHQ9
SUM OF ALL RESPONSES TO PHQ QUESTIONS 1-9: 0
1. LITTLE INTEREST OR PLEASURE IN DOING THINGS: 0
SUM OF ALL RESPONSES TO PHQ9 QUESTIONS 1 & 2: 0
2. FEELING DOWN, DEPRESSED OR HOPELESS: 0
SUM OF ALL RESPONSES TO PHQ QUESTIONS 1-9: 0

## 2024-01-29 NOTE — PROGRESS NOTES
Emmy Schwarz presents today for   Chief Complaint   Patient presents with    Medicare AWV       1. \"Have you been to the ER, urgent care clinic since your last visit?  Hospitalized since your last visit?\" No    2. \"Have you seen or consulted any other health care providers outside of the [facility] since your last visit?\" No     3. For patients aged 45-75: Has the patient had a colonoscopy / FIT/ Cologuard? NA - based on age      If the patient is female:    4. For patients aged 40-74: Has the patient had a mammogram within the past 2 years? NA - based on age or sex      5. For patients aged 21-65: Has the patient had a pap smear? NA - based on age or sex

## 2024-01-31 PROBLEM — E11.9 TYPE 2 DIABETES MELLITUS WITHOUT COMPLICATION, WITHOUT LONG-TERM CURRENT USE OF INSULIN (HCC): Status: ACTIVE | Noted: 2024-01-31

## 2024-01-31 RX ORDER — ROSUVASTATIN CALCIUM 20 MG/1
20 TABLET, COATED ORAL DAILY
Qty: 90 TABLET | Refills: 1 | Status: SHIPPED | OUTPATIENT
Start: 2024-01-31

## 2024-01-31 RX ORDER — METFORMIN HYDROCHLORIDE 500 MG/1
500 TABLET, EXTENDED RELEASE ORAL
Qty: 90 TABLET | Refills: 1 | Status: SHIPPED | OUTPATIENT
Start: 2024-01-31

## 2024-01-31 NOTE — ACP (ADVANCE CARE PLANNING)
Advance Care Planning     Advance Care Planning (ACP) Physician/NP/PA Conversation    Date of Conversation: 1/29/2024  Conducted with: Patient with Decision Making Capacity    Healthcare Decision Maker:      Primary Decision Maker: Alfredo Schwarz - Spouse - [phone]    Click here to complete Healthcare Decision Makers including selection of the Healthcare Decision Maker Relationship (ie \"Primary\")  Today we documented Decision Maker(s) consistent with Legal Next of Kin hierarchy.    Care Preferences:    Hospitalization:  \"If your health worsens and it becomes clear that your chance of recovery is unlikely, what would be your preference regarding hospitalization?\"  The patient would prefer hospitalization.    Ventilation:  \"If you were unable to breath on your own and your chance of recovery was unlikely, what would be your preference about the use of a ventilator (breathing machine) if it was available to you?\"  The patient would desire the use of a ventilator.    Resuscitation:  \"In the event your heart stopped as a result of an underlying serious health condition, would you want attempts made to restart your heart, or would you prefer a natural death?\"  Yes, attempt to resuscitate.    ventilation preferences, hospitalization preferences, and resuscitation preferences    Conversation Outcomes / Follow-Up Plan:  ACP in process - information provided, considering goals and options  Reviewed DNR/DNI and patient elects Full Code (Attempt Resuscitation)    Length of Voluntary ACP Conversation in minutes:  16 minutes    CAMILLE ROMERO MD

## 2024-01-31 NOTE — PROGRESS NOTES
Medicare Annual Wellness Visit    Emmy Schwarz is here for Medicare AW    Assessment & Plan   Advanced care planning/counseling discussion  Essential hypertension  GERD without esophagitis  Multinodular goiter  Dyslipidemia  -     rosuvastatin (CRESTOR) 20 MG tablet; Take 1 tablet by mouth daily, Disp-90 tablet, R-1Normal  BMI 35.0-35.9,adult  Type 2 diabetes mellitus without complication, without long-term current use of insulin (HCC)  -     metFORMIN (GLUCOPHAGE-XR) 500 MG extended release tablet; Take 1 tablet by mouth daily (with breakfast), Disp-90 tablet, R-1Normal  -     [facility] - Pharmacist HR Facilities-Tejinder Saab [facility])  -     blood glucose monitor kit and supplies; Dispense sufficient amount for indicated testing frequency plus additional to accommodate PRN testing needs. Dispense all needed supplies to include: monitor, strips, lancing device, lancets, control solutions, alcohol swabs., Disp-1 kit, R-0, Normal  -     Comprehensive Metabolic Panel; Future  -     HEMOGLOBIN A1C W/O EAG; Future  -     Lipid Panel; Future  -     Microalbumin / Creatinine Urine Ratio; Future  Anxiety  Medicare annual wellness visit, subsequent    Recommendations for Preventive Services Due: see orders and patient instructions/AVS.  Recommended screening schedule for the next 5-10 years is provided to the patient in written form: see Patient Instructions/AVS.     Return for Medicare Annual Wellness Visit in 1 year.     Subjective       Patient's complete Health Risk Assessment and screening values have been reviewed and are found in Flowsheets. The following problems were reviewed today and where indicated follow up appointments were made and/or referrals ordered.    Positive Risk Factor Screenings with Interventions:       Cognitive:      Words recalled: 2 Words Recalled     Total Score Interpretation: Abnormal Mini-Cog  Interventions:  Patient declines any further evaluation or treatment

## 2024-02-01 DIAGNOSIS — F41.9 ANXIETY: Primary | ICD-10-CM

## 2024-02-01 NOTE — TELEPHONE ENCOUNTER
----- Message from Lizzette Fagan sent at 2/1/2024  2:28 PM EST -----  Subject: Refill Request    QUESTIONS  Name of Medication? diazePAM (VALIUM) 5 MG tablet  Patient-reported dosage and instructions? as needed  How many days do you have left? 0  Preferred Pharmacy? [id number]  Pharmacy phone number (if available)? [phone]  ---------------------------------------------------------------------------  --------------  CALL BACK INFO  What is the best way for the office to contact you? OK to leave message on   voicemail  Preferred Call Back Phone Number? [phone]  ---------------------------------------------------------------------------  --------------  SCRIPT ANSWERS  Relationship to Patient? Self

## 2024-02-01 NOTE — TELEPHONE ENCOUNTER
[facility]  report reviewed 2/1/2024    The last fill date for Diazepam 5 Mg Tablet  was 1/16/2024 for a 15 d/s qty 30      Last UDS: not on file    CSA Last signed: not on file         PCP: Basilio Means MD    Last appt: 1/29/2024  Future Appointments   Date Time Provider Department Center   2/26/2024  8:30 AM Tejinder Saab [facility] AM[facility]   6/3/2024 10:40 AM Basilio Means MD [facility] AMB       Requested Prescriptions     Pending Prescriptions Disp Refills    diazePAM (VALIUM) 5 MG tablet  0     Sig: Take 1 tablet by mouth.

## 2024-02-02 RX ORDER — DIAZEPAM 5 MG/1
5 TABLET ORAL EVERY 12 HOURS PRN
Qty: 60 TABLET | Refills: 0 | Status: SHIPPED | OUTPATIENT
Start: 2024-02-02 | End: 2024-03-03

## 2024-02-22 NOTE — PROGRESS NOTES
Pharmacy Progress Note - Diabetes Management       Assessment / Plan:   Diabetes Management:  Per ADA guidelines, Pt's A1c is at goal of < 7%.  Pt newly dx.  Provided significant education and dietary counseling.  Will have her restart her metformin ER 500mg daily to assess with glycemic control.  Will have her  her glucometer and start monitoring her FBG and 2hr post-prandial values for her education as well as to monitor control.  Will reassess with SMBG logs at follow up in 6 weeks.    Hyperlipidemia:  The 10-year ASCVD risk score (Jose G DK, et al., 2019) is: 40.6% based on parameters listed. Current lipid treatment guidelines recommend at least moderate-intensity statin doses for all patients with diabetes to decrease overall ASCVD risk. Patient currently qualifies for a high intensity statin therapy based on current recommendations and is currently taking a high intensity statin.      Nutrition/Lifestyle Modifications:  - Educated pt on the importance of moderating carbohydrate intake. Reviewed sources of carbohydrates and method to help determine appropriate portion sizes (e.g., Diabetes Plate Method).  - Advised patient to avoid sugar-sweetened beverages and replace with water or diet/zero sugar option.  - Recommend ~30 minutes consistent, moderately intensive, exercise/day or ~150 minutes/week. Start small, stay consistent, and increase length and types of exercise, as tolerated.       Patient will return to clinic in 6 week(s) for follow up.        S/O: Ms. Emmy Schwarz, a 78 y.o. female referred by Basilio Means MD,  has a past medical history of Allergic rhinitis, Anemia, Chronic constipation, DM (diabetes mellitus) (HCC), Dyslipidemia, FHx: heart disease, Fibrocystic breast, GERD (gastroesophageal reflux disease), Hypertension, IFG (impaired fasting glucose), Morbid obesity (HCC), Multinodular goiter, Osteoarthritis, knee, and Varicose vein of leg.  Pt was seen today for diabetes

## 2024-02-26 ENCOUNTER — PHARMACY VISIT (OUTPATIENT)
Age: 79
End: 2024-02-26

## 2024-02-26 DIAGNOSIS — E11.9 TYPE 2 DIABETES MELLITUS WITHOUT COMPLICATION, WITHOUT LONG-TERM CURRENT USE OF INSULIN (HCC): Primary | ICD-10-CM

## 2024-02-26 NOTE — PATIENT INSTRUCTIONS
Your Visit Summary:     Plan:  - Restart Metformin 500mg every day    Call me with any questions or concerns  Tejinder Saab [phone]    Check and document your blood sugar first thing in the morning (fasting at least 8 hours), 2 hours after a meal, and/or before bedtime.   Bring your meter/log to all future visits.     Your blood sugar goals:  - Fasting (first thing in the morning)  blood sugar: 80 - 130mg/dL  - 2 hours after a meal: 80 - 180mg/dL    When you experience symptoms of low blood sugar (example: less than 70):  - Confirm low reading by checking your blood sugar.   - Then treat with 15 grams of carbohydrates (one-half cup of juice or regular soda, or 4-5 glucose tablets).  - Wait 15 minutes to recheck blood sugar.   - Then eat a protein containing meal/snack to prevent another low blood sugar episode. (example: peanut butter + crackers)    Nutrition:  - When reviewing a nutrition label, focus on the serving size, total calories, fat (and type of fats), total carbohydrates, sugar (and amount of added sugar), amount of fiber (good for your digestive), and amount of protein.  Refer to your nutrition label guide for more information.  - For a meal : max 45 - 60 grams of carbohydrates  - For a snack: max 15 grams of carbohydrates  - Reduce amount of saturated and trans fat.  Consider more unsaturated fat options as they are better for your heart health.   - Have at least 1 serving of lean fat protein with each meal.    - Increase fiber intake slowly to prevent constipation.   - Substitute fruit juices for the whole fruit    Low carb snack ideas (15 grams total carb or less):    String cheese or babybel with 6 crackers  4 peanut butter crackers  3 cups of popcorn  1 cup raw vegetables with hummus or ranch dip (just need to watch how much dip you use)  Nuts  2 rice cakes  Celery with peanut butter or cream cheese  String cheese with 1 serving of fruit  Greek yogurt (look at label to make sure < 15 gram

## 2024-03-04 ENCOUNTER — TELEPHONE (OUTPATIENT)
Age: 79
End: 2024-03-04

## 2024-03-04 NOTE — TELEPHONE ENCOUNTER
Pt called stating she uses the one touch ultra 2 and is unable to get enough blood to do the testing pt is asking to come in and get her blood sugar tested and have someone show her how to use the one touch ultra please advise

## 2024-03-07 ENCOUNTER — OFFICE VISIT (OUTPATIENT)
Age: 79
End: 2024-03-07

## 2024-03-07 DIAGNOSIS — E11.9 TYPE 2 DIABETES MELLITUS WITHOUT COMPLICATION, WITHOUT LONG-TERM CURRENT USE OF INSULIN (HCC): Primary | ICD-10-CM

## 2024-03-07 NOTE — PROGRESS NOTES
Patient presents to clinic for education on how to use the Ultra One Touch meter. Patient brought all materials and a walk through with meter was performed. Patient was able to grasp use and verbalized understanding.

## 2024-03-08 ENCOUNTER — TELEMEDICINE (OUTPATIENT)
Age: 79
End: 2024-03-08
Payer: MEDICARE

## 2024-03-08 ENCOUNTER — TELEPHONE (OUTPATIENT)
Age: 79
End: 2024-03-08

## 2024-03-08 DIAGNOSIS — R09.81 SINUS CONGESTION: ICD-10-CM

## 2024-03-08 DIAGNOSIS — B34.9 VIRAL SYNDROME: Primary | ICD-10-CM

## 2024-03-08 PROCEDURE — 1123F ACP DISCUSS/DSCN MKR DOCD: CPT | Performed by: INTERNAL MEDICINE

## 2024-03-08 PROCEDURE — 99213 OFFICE O/P EST LOW 20 MIN: CPT | Performed by: INTERNAL MEDICINE

## 2024-03-08 NOTE — TELEPHONE ENCOUNTER
----- Message from Emmy Schwarz sent at 3/8/2024  7:58 AM EST -----  Regarding: Sinus Infection  Contact: [phone]  I have been diagnosed diabetic and not sure what to take for extreme sinus infection and \"runny\" eyes. Please respond promptly.

## 2024-03-08 NOTE — TELEPHONE ENCOUNTER
----- Message from Emmy Schwarz sent at 3/8/2024 12:52 PM EST -----  Regarding: Results of Covid 19 Test  Contact: [phone]  The results of the test was negative.

## 2024-03-08 NOTE — PROGRESS NOTES
Emmy Schwarz 78 y.o. who was seen by synchronous (real-time) audio-video technology for   Chief Complaint   Patient presents with    Sinus Problem             Assessment & Plan:      Diagnosis Orders   1. Viral syndrome        2. Sinus congestion              712  Subjective:   Starting yesterday, she noted onset of some sinus complaints.  Uses Flonase for allergies.  She has noted some fullness and pressure mostly in the frontal and right maxillary regions.  No purulent material, has mild postnasal drip w resulting nonproductive cough, some ear fullness on the left primarily, no measured fevers.  No known exposures.  She has not checked for COVID as yet    Objective:     Past Medical History:   Diagnosis Date    Allergic rhinitis     Anemia     chronic    Chronic constipation     DM (diabetes mellitus) ([facility]) 01/2024    on basis of a1c    Dyslipidemia     10 year calculated risk score was 10.3% (7/13); 11.3% (10/14); 15.2% (3/16)    FHx: heart disease     Fibrocystic breast     neg bx x2 1970s    GERD (gastroesophageal reflux disease)     Hypertension     IFG (impaired fasting glucose) 10/31/2014    Morbid obesity (HCC)     peak weight 230 lbs, bmi 38.8 from 10/11; IF start weight 209 lbs but not doing    Multinodular goiter     neg bx Dr Sue 2007    Osteoarthritis, knee     Dr. Nava    Varicose vein of leg 2023       Past Surgical History:   Procedure Laterality Date    APPENDECTOMY      BREAST BIOPSY Bilateral     1967 (RT)/ 1971 (LT)    CATARACT REMOVAL Bilateral     COLONOSCOPY      Dr. Mendoza neg (2002); Dr. Joshi negative (4/3/12)    COLONOSCOPY N/A 7/11/2022    Dr Joshi neg    DEXA BONE DENSITY AXIAL SKELETON      DEXA t score 0.2 spine, -0.2 hip (9/09); -0.1 spine, -0.2 hip (3/14)    DILATION AND CURETTAGE OF UTERUS      x2     HEMORRHOID SURGERY      HYSTERECTOMY (CERVIX STATUS UNKNOWN)      due to  leimyomata    PELVIC LAPAROSCOPY         Current Outpatient Medications   Medication Sig

## 2024-04-04 NOTE — PROGRESS NOTES
by mouth daily (Patient not taking: Reported on 7/25/2023)     No current facility-administered medications for this visit.     Allergies:  Allergies   Allergen Reactions    Codeine Itching     \"Loopy\"       Blood Glucose Monitoring (BGM) or CGM:            ROS:  Today, Pt endorses:  - Symptoms of Hyperglycemia: none  - Symptoms of Hypoglycemia: none    Lifestyle modification(s):  - as above    Medication Adherence/Access:  - Endorses adherence to current regimen?: Yes    Vitals/Labs:  No results found for: \"HBA1C\"  Hemoglobin A1C   Date Value Ref Range Status   01/22/2024 6.7 (H) 4.8 - 5.6 % Final     Comment:                 Prediabetes: 5.7 - 6.4           Diabetes: >6.4           Glycemic control for adults with diabetes: <7.0     01/09/2023 6.4 (H) 4.8 - 5.6 % Final     Comment:              Prediabetes: 5.7 - 6.4           Diabetes: >6.4           Glycemic control for adults with diabetes: <7.0     12/06/2021 6.1 (H) 4.8 - 5.6 % Final     Comment:              Prediabetes: 5.7 - 6.4           Diabetes: >6.4           Glycemic control for adults with diabetes: <7.0         Screenings/Prevention Parameters:  -Diabetic Eye and Foot Exams:    There are no preventive care reminders to display for this patient.  -Microalbumin / Creatinine ratio:       Lab Results   Component Value Date/Time    MICROALBUR 0.77 11/20/2019 08:37 AM    LABCREA 158.00 11/20/2019 08:37 AM        Lab Results   Component Value Date    MALBCR 5 11/20/2019      No results found for: \"MALBCREARAT\"  -Immunizations:      Immunization History   Administered Date(s) Administered    COVID-19, MODERNA BLUE border, Primary or Immunocompromised, (age 12y+), IM, 100 mcg/0.5mL 02/16/2021, 03/16/2021, 10/27/2021, 04/01/2022, 10/27/2022    Influenza A ([id number]),all Formulations 12/01/2009    Influenza Trivalent 10/31/2014    Influenza Virus Vaccine 09/20/2011, 11/19/2015    Influenza, FLUAD, (age 65 y+), Adjuvanted, 0.5mL 10/01/2023    Influenza, High

## 2024-04-08 ENCOUNTER — PHARMACY VISIT (OUTPATIENT)
Age: 79
End: 2024-04-08

## 2024-04-08 DIAGNOSIS — E11.9 TYPE 2 DIABETES MELLITUS WITHOUT COMPLICATION, WITHOUT LONG-TERM CURRENT USE OF INSULIN (HCC): ICD-10-CM

## 2024-04-08 RX ORDER — METFORMIN HYDROCHLORIDE 500 MG/1
2000 TABLET, EXTENDED RELEASE ORAL DAILY
Qty: 360 TABLET | Refills: 3 | Status: SHIPPED | OUTPATIENT
Start: 2024-04-08

## 2024-04-08 NOTE — PATIENT INSTRUCTIONS
- Increase Metformin:   * Increase to 2 tablets (1000mg total) for 2 weeks - if you have any GI issues, continue 2 tablets for another 2 weeks   * After 2-4 weeks, increase to 3 tablets (1500mg total) for 2 weeks - if you have any GI issues, continue 3 tablets for another 2 weeks   * After 2-4 weeks, increase to 4 tablets moving forward

## 2024-05-24 ENCOUNTER — HOSPITAL ENCOUNTER (OUTPATIENT)
Facility: HOSPITAL | Age: 79
End: 2024-05-24
Payer: MEDICARE

## 2024-05-24 DIAGNOSIS — E78.5 DYSLIPIDEMIA: ICD-10-CM

## 2024-05-24 DIAGNOSIS — I10 ESSENTIAL HYPERTENSION: ICD-10-CM

## 2024-05-24 DIAGNOSIS — R73.01 IFG (IMPAIRED FASTING GLUCOSE): ICD-10-CM

## 2024-05-24 DIAGNOSIS — E04.2 MULTINODULAR GOITER: ICD-10-CM

## 2024-05-24 LAB
ALBUMIN SERPL-MCNC: 3.8 G/DL (ref 3.4–5)
ALBUMIN/GLOB SERPL: 1.1 (ref 0.8–1.7)
ALP SERPL-CCNC: 93 U/L (ref 45–117)
ALT SERPL-CCNC: 15 U/L (ref 13–56)
ANION GAP SERPL CALC-SCNC: 6 MMOL/L (ref 3–18)
AST SERPL-CCNC: 15 U/L (ref 10–38)
BILIRUB SERPL-MCNC: 0.5 MG/DL (ref 0.2–1)
BUN SERPL-MCNC: 23 MG/DL (ref 7–18)
BUN/CREAT SERPL: 17 (ref 12–20)
CALCIUM SERPL-MCNC: 10 MG/DL (ref 8.5–10.1)
CHLORIDE SERPL-SCNC: 99 MMOL/L (ref 100–111)
CHOLEST SERPL-MCNC: 177 MG/DL
CO2 SERPL-SCNC: 32 MMOL/L (ref 21–32)
CREAT SERPL-MCNC: 1.38 MG/DL (ref 0.6–1.3)
GLOBULIN SER CALC-MCNC: 3.6 G/DL (ref 2–4)
GLUCOSE SERPL-MCNC: 111 MG/DL (ref 74–99)
HBA1C MFR BLD: 6.3 % (ref 4.2–5.6)
HDLC SERPL-MCNC: 94 MG/DL (ref 40–60)
HDLC SERPL: 1.9 (ref 0–5)
LDLC SERPL CALC-MCNC: 66 MG/DL (ref 0–100)
LIPID PANEL: ABNORMAL
POTASSIUM SERPL-SCNC: 3.3 MMOL/L (ref 3.5–5.5)
PROT SERPL-MCNC: 7.4 G/DL (ref 6.4–8.2)
SODIUM SERPL-SCNC: 137 MMOL/L (ref 136–145)
T4 FREE SERPL-MCNC: 1.4 NG/DL (ref 0.7–1.5)
TRIGL SERPL-MCNC: 85 MG/DL
TSH SERPL DL<=0.05 MIU/L-ACNC: 1.27 UIU/ML (ref 0.36–3.74)
VLDLC SERPL CALC-MCNC: 17 MG/DL

## 2024-05-24 PROCEDURE — 36415 COLL VENOUS BLD VENIPUNCTURE: CPT

## 2024-05-24 PROCEDURE — 84439 ASSAY OF FREE THYROXINE: CPT

## 2024-05-24 PROCEDURE — 83036 HEMOGLOBIN GLYCOSYLATED A1C: CPT

## 2024-05-24 PROCEDURE — 80053 COMPREHEN METABOLIC PANEL: CPT

## 2024-05-24 PROCEDURE — 84443 ASSAY THYROID STIM HORMONE: CPT

## 2024-05-24 PROCEDURE — 80061 LIPID PANEL: CPT

## 2024-06-04 PROBLEM — E11.22 TYPE 2 DIABETES MELLITUS WITH CHRONIC KIDNEY DISEASE (HCC): Status: RESOLVED | Noted: 2024-06-04 | Resolved: 2024-06-04

## 2024-06-04 PROBLEM — E11.22 TYPE 2 DIABETES MELLITUS WITH CHRONIC KIDNEY DISEASE (HCC): Status: ACTIVE | Noted: 2024-06-04

## 2024-06-25 ENCOUNTER — HOSPITAL ENCOUNTER (OUTPATIENT)
Facility: HOSPITAL | Age: 79
Setting detail: SPECIMEN
Discharge: HOME | End: 2024-06-28
Payer: MEDICARE

## 2024-06-25 DIAGNOSIS — N17.9 AKI (ACUTE KIDNEY INJURY) (HCC): ICD-10-CM

## 2024-06-25 LAB
ANION GAP SERPL CALC-SCNC: 4 MMOL/L (ref 3–18)
BUN SERPL-MCNC: 13 MG/DL (ref 7–18)
BUN/CREAT SERPL: 14 (ref 12–20)
CALCIUM SERPL-MCNC: 8.8 MG/DL (ref 8.5–10.1)
CHLORIDE SERPL-SCNC: 109 MMOL/L (ref 100–111)
CO2 SERPL-SCNC: 30 MMOL/L (ref 21–32)
CREAT SERPL-MCNC: 0.92 MG/DL (ref 0.6–1.3)
GLUCOSE SERPL-MCNC: 116 MG/DL (ref 74–99)
POTASSIUM SERPL-SCNC: 3.7 MMOL/L (ref 3.5–5.5)
SODIUM SERPL-SCNC: 143 MMOL/L (ref 136–145)

## 2024-06-25 PROCEDURE — 80048 BASIC METABOLIC PNL TOTAL CA: CPT

## 2024-06-25 PROCEDURE — 36415 COLL VENOUS BLD VENIPUNCTURE: CPT

## 2024-06-27 ENCOUNTER — TELEPHONE (OUTPATIENT)
Facility: CLINIC | Age: 79
End: 2024-06-27

## 2024-06-27 NOTE — TELEPHONE ENCOUNTER
Pls call    Results for orders placed or performed during the hospital encounter of 06/25/24 (from the past 2160 hour(s))   Basic Metabolic Panel   Result Value Ref Range    Sodium 143 136 - 145 mmol/L    Potassium 3.7 3.5 - 5.5 mmol/L    Chloride 109 100 - 111 mmol/L    CO2 30 21 - 32 mmol/L    Anion Gap 4 3.0 - 18 mmol/L    Glucose 116 (H) 74 - 99 mg/dL    BUN 13 7.0 - 18 MG/DL    Creatinine 0.92 0.6 - 1.3 MG/DL    BUN/Creatinine Ratio 14 12 - 20      Est, Glom Filt Rate 64 >60 ml/min/1.73m2    Calcium 8.8 8.5 - 10.1 MG/DL   Results for orders placed or performed during the hospital encounter of 05/24/24 (from the past 2160 hour(s))   HEMOGLOBIN A1C W/O EAG   Result Value Ref Range    Hemoglobin A1C 6.3 (H) 4.2 - 5.6 %   Comprehensive Metabolic Panel   Result Value Ref Range    Sodium 137 136 - 145 mmol/L    Potassium 3.3 (L) 3.5 - 5.5 mmol/L    Chloride 99 (L) 100 - 111 mmol/L    CO2 32 21 - 32 mmol/L    Anion Gap 6 3.0 - 18 mmol/L    Glucose 111 (H) 74 - 99 mg/dL    BUN 23 (H) 7.0 - 18 MG/DL    Creatinine 1.38 (H) 0.6 - 1.3 MG/DL    BUN/Creatinine Ratio 17 12 - 20      Est, Glom Filt Rate 39 (L) >60 ml/min/1.73m2    Calcium 10.0 8.5 - 10.1 MG/DL    Total Bilirubin 0.5 0.2 - 1.0 MG/DL    ALT 15 13 - 56 U/L    AST 15 10 - 38 U/L    Alk Phosphatase 93 45 - 117 U/L    Total Protein 7.4 6.4 - 8.2 g/dL    Albumin 3.8 3.4 - 5.0 g/dL    Globulin 3.6 2.0 - 4.0 g/dL    Albumin/Globulin Ratio 1.1 0.8 - 1.7     Lipid Panel   Result Value Ref Range    LIPID PANEL        Cholesterol, Total 177 <200 MG/DL    Triglycerides 85 <150 MG/DL    HDL 94 (H) 40 - 60 MG/DL    LDL Cholesterol 66 0 - 100 MG/DL    VLDL Cholesterol Calculated 17 MG/DL    Chol/HDL Ratio 1.9 0 - 5.0     TSH + Free T4 Panel   Result Value Ref Range    TSH, 3rd Generation 1.27 0.36 - 3.74 uIU/mL    T4 Free 1.4 0.7 - 1.5 NG/DL     XXXXXXXXX    Creat corrected off the diuretic  Stay off diuretic going forward assuming bp ok

## 2024-06-28 NOTE — TELEPHONE ENCOUNTER
Creat corrected off the diuretic  Stay off diuretic going forward assuming bp ok    Patient returned call to clinic, relayed message above from Dr. Means. Patient verbalized understanding and states BP is doing well at this time. Also wants her cell phone number to be called in the future, changed it through demographics.    No further action required.

## 2024-07-11 NOTE — PROGRESS NOTES
questions were answered.  AVS was handed to the patient. Patient advised to call the office with any additional questions or concerns.    Notifications of recommendations will be sent to Basilio Means MD for review.      Thank you for the consult,  Tejinder Saab, PharmD, BCACP, [facility]        For Pharmacy Admin Tracking Only    Program: Medical Group  CPA in place:  Yes  Recommendation Provided To: Patient/Caregiver: 3 via In person  Intervention Detail: Adherence Monitorin, Dose Adjustment: 1, reason: Therapy Optimization, and Scheduled Appointment  Intervention Accepted By: Patient/Caregiver: 3  Gap Closed?: Yes   Time Spent (min): 30

## 2024-07-15 ENCOUNTER — PHARMACY VISIT (OUTPATIENT)
Facility: CLINIC | Age: 79
End: 2024-07-15

## 2024-07-15 DIAGNOSIS — E11.9 TYPE 2 DIABETES MELLITUS WITHOUT COMPLICATION, WITHOUT LONG-TERM CURRENT USE OF INSULIN (HCC): ICD-10-CM

## 2024-07-15 RX ORDER — METFORMIN HYDROCHLORIDE 500 MG/1
2000 TABLET, EXTENDED RELEASE ORAL DAILY
Qty: 360 TABLET | Refills: 3 | Status: SHIPPED
Start: 2024-07-15

## 2024-07-19 ENCOUNTER — HOSPITAL ENCOUNTER (EMERGENCY)
Facility: HOSPITAL | Age: 79
Discharge: HOME | End: 2024-07-19
Attending: STUDENT IN AN ORGANIZED HEALTH CARE EDUCATION/TRAINING PROGRAM
Payer: MEDICARE

## 2024-07-19 VITALS
DIASTOLIC BLOOD PRESSURE: 72 MMHG | RESPIRATION RATE: 16 BRPM | HEIGHT: 65 IN | OXYGEN SATURATION: 100 % | WEIGHT: 184 LBS | BODY MASS INDEX: 30.66 KG/M2 | SYSTOLIC BLOOD PRESSURE: 147 MMHG | TEMPERATURE: 97.8 F | HEART RATE: 66 BPM

## 2024-07-19 DIAGNOSIS — E78.5 DYSLIPIDEMIA: ICD-10-CM

## 2024-07-19 DIAGNOSIS — I15.9 SECONDARY HYPERTENSION: Primary | ICD-10-CM

## 2024-07-19 PROCEDURE — 99282 EMERGENCY DEPT VISIT SF MDM: CPT

## 2024-07-19 ASSESSMENT — PAIN - FUNCTIONAL ASSESSMENT: PAIN_FUNCTIONAL_ASSESSMENT: NONE - DENIES PAIN

## 2024-07-19 NOTE — ED PROVIDER NOTES
EMERGENCY DEPARTMENT HISTORY AND PHYSICAL EXAM    Date: 7/19/2024  Patient Name: Emmy Schwarz    History of Presenting Illness     Chief Complaint   Patient presents with    Hypertension     History (Context): Emmy Schwarz is a 78 y.o. female w/ a PMHx of anemia, chronic constipation, T2DM, HLD, fibrocystic breasts, morbid obesity, OA of b/l knee, varicose veins, multinodular goiter who presents to the ED today with chief complaint of hypertension. Patient states her blood pressure was elevated to 134 at home and on subsequent check, it was in the 150s. She had previously been on triamterene for HTN and hypokalemia; her hypokalemia resolved and her PCP discontinued her triamterene. When her BP remained elevated, she took an extra triamterene she had leftover which did not decrease her blood pressure. At that time, she also had palpitations which resolved w/out further intervention, however this worried her and she went to have it checked at [facility]. It was in the 160s so the pharmacist told her to call her doctor. When she could not reach her doctor, she presented to the ED. On arrival her blood pressure was 147/72; she was asymptomatic at that time and her palpitations had not recurred. She also denies any CP, SOB, lightheadedness/dizziness, new headache, numbness or tingling. She states she feels as though she is at her baseline.     PCP: Basilio Means MD    No current facility-administered medications for this encounter.     Current Outpatient Medications   Medication Sig Dispense Refill    metFORMIN (GLUCOPHAGE-XR) 500 MG extended release tablet Take 4 tablets by mouth daily 360 tablet 3    rosuvastatin (CRESTOR) 20 MG tablet Take 1 tablet by mouth daily 90 tablet 3    vitamin B-12 (CYANOCOBALAMIN) 1000 MCG tablet Take 1 tablet by mouth daily      blood glucose monitor kit and supplies Dispense sufficient amount for indicated testing frequency plus additional to accommodate PRN testing needs.

## 2024-07-19 NOTE — ED TRIAGE NOTES
Pt to ED for eval of hypertension. Pt reports she was taken off her BP med after having low potassium levels in June. Was not given another BP med to cover her. Pt reports she was feeling \"funny\" and took her BP the other day and it was elevated, so she took another of the pills that made her potassium low and it made her feel better. Pt still feeling bad today with elevated BP at 180/91 at [facility] today. Pt took the triamterene after that and came to ED.

## 2024-07-19 NOTE — DISCHARGE INSTRUCTIONS
Return to the emergency department should your blood pressure remains persistently elevated over 180, or your blood pressure is elevated and you experience any of the following symptoms: Chest pain, shortness of breath, heart racing or lightheadedness that will not resolve, new headaches that are different from prior headaches.     For blood pressure readings that remain elevated between 140 and 180, please call your doctor or return to the emergency department if your doctor cannot be reached.

## 2024-07-24 RX ORDER — ROSUVASTATIN CALCIUM 20 MG/1
20 TABLET, COATED ORAL DAILY
Qty: 90 TABLET | Refills: 3 | Status: SHIPPED | OUTPATIENT
Start: 2024-07-24

## 2024-08-16 DIAGNOSIS — F41.9 ANXIETY: ICD-10-CM

## 2024-08-18 RX ORDER — DIAZEPAM 5 MG/1
TABLET ORAL
Qty: 60 TABLET | Refills: 0 | Status: SHIPPED | OUTPATIENT
Start: 2024-08-18 | End: 2024-09-17

## 2024-08-20 NOTE — PROGRESS NOTES
Pharmacy Progress Note - Diabetes Management       Assessment / Plan:   Diabetes Management:  Per ADA guidelines, Pt's A1c is at goal of < 7%.  Both FBG and NFBG values are wnl.  Encouraged to restart exercise at the [facility] for her physical activity.  Will maintain her current tx and will reassess with SMBG logs in 3 months.     Nutrition/Lifestyle Modifications:  - Educated pt on the importance of moderating carbohydrate intake. Reviewed sources of carbohydrates and method to help determine appropriate portion sizes (e.g., Diabetes Plate Method).  - Advised patient to avoid sugar-sweetened beverages and replace with water or diet/zero sugar option.  - Recommend ~30 minutes consistent, moderately intensive, exercise/day or ~150 minutes/week. Start small, stay consistent, and increase length and types of exercise, as tolerated.       Patient will return to clinic in 3 month(s) for follow up.        S/O: Ms. Emmy Schwarz, a 78 y.o. female referred by Basilio Means MD,  has a past medical history of Allergic rhinitis, Anemia, Chronic constipation, DM (diabetes mellitus) (HCC), Dyslipidemia, FHx: heart disease, Fibrocystic breast, GERD (gastroesophageal reflux disease), Hypertension, IFG (impaired fasting glucose), Morbid obesity (HCC), Multinodular goiter, Osteoarthritis, knee, and Varicose vein of leg.  Pt was seen today for diabetes management.  Patient's last A1c was:   Hemoglobin A1C   Date Value Ref Range Status   05/24/2024 6.3 (H) 4.2 - 5.6 % Final     Comment:     (NOTE)  HbA1C Interpretive Ranges  <5.7              Normal  5.7 - 6.4         Consider Prediabetes  >6.5              Consider Diabetes         Interim update: Pt was last seen by me on 7/15/2024.  Per my prior note: Pt's A1c is at goal of < 7%.  Pt's glycemic control is maintained from her SMBG logs.  Her activity level has increased which is likely contributing to the similar BG values from the last visit.  Now that her renal function is  IM, 0.5mL 10/01/2015    Td vaccine (adult) 1999    Tst, Unspecified Formulation 2012    Zoster Live (Zostavax) 2011    Zoster Recombinant (Shingrix) 2021, 2021       Additional Laboratory Parameters of Interest:   Estimation of renal function:  Lab Results   Component Value Date/Time    LABGLOM 64 2024 07:47 AM    LABGLOM 39 2024 08:58 AM    LABGLOM 61 2024 12:00 AM    LABGLOM 76 2023 12:00 AM    LABGLOM 72 2023 07:35 AM    GFRAA 73 2021 12:00 AM    GFRAA 71 2021 07:56 AM    GFRAA >60 2020 08:29 AM     Wt Readings from Last 3 Encounters:   24 83.5 kg (184 lb)   24 81.2 kg (179 lb)   24 88.5 kg (195 lb)     Ht Readings from Last 1 Encounters:   24 1.651 m (5' 5\")     Calculated estimated creatinine clearance: CrCl cannot be calculated (Unknown ideal weight.).    Vital Signs Today:    There were no vitals taken for this visit.    There are no discontinued medications.    Orders Placed This Encounter    metFORMIN (GLUCOPHAGE-XR) 500 MG extended release tablet     Sig: Take 4 tablets by mouth daily     Dispense:  360 tablet     Refill:  3       Future Appointments   Date Time Provider Department Center   10/11/2024  9:40 AM Basilio Means MD [facility] DEP   [facility]       Patient verbalized understanding of the information presented and all of the patient’s questions were answered.  AVS was handed to the patient. Patient advised to call the office with any additional questions or concerns.    Notifications of recommendations will be sent to Basilio Means MD for review.      Thank you for the consult,  Tejinder Saab, PharmD, BCACP, BC-[facility]        For Pharmacy Admin Tracking Only    Program: Medical Group  CPA in place:  Yes  Recommendation Provided To: Patient/Caregiver: 3 via In person  Intervention Detail: Adherence Monitorin, Refill(s) Provided, and

## 2024-08-26 ENCOUNTER — PHARMACY VISIT (OUTPATIENT)
Facility: CLINIC | Age: 79
End: 2024-08-26

## 2024-08-26 DIAGNOSIS — E11.9 TYPE 2 DIABETES MELLITUS WITHOUT COMPLICATION, WITHOUT LONG-TERM CURRENT USE OF INSULIN (HCC): ICD-10-CM

## 2024-08-26 RX ORDER — METFORMIN HCL 500 MG
2000 TABLET, EXTENDED RELEASE 24 HR ORAL DAILY
Qty: 360 TABLET | Refills: 3 | Status: SHIPPED | OUTPATIENT
Start: 2024-08-26

## 2024-10-10 ENCOUNTER — TELEPHONE (OUTPATIENT)
Facility: CLINIC | Age: 79
End: 2024-10-10

## 2024-10-10 NOTE — TELEPHONE ENCOUNTER
Pt has an appt on 10/17- she is asking if she needs labs prior to her appt ? There are  active orders but they have 12/04 as an expectied date please advise

## 2024-10-14 ENCOUNTER — LAB (OUTPATIENT)
Facility: CLINIC | Age: 79
End: 2024-10-14

## 2024-10-14 DIAGNOSIS — E11.9 TYPE 2 DIABETES MELLITUS WITHOUT COMPLICATION, WITHOUT LONG-TERM CURRENT USE OF INSULIN (HCC): ICD-10-CM

## 2024-10-14 DIAGNOSIS — I10 ESSENTIAL HYPERTENSION: ICD-10-CM

## 2024-10-15 LAB
ALBUMIN SERPL-MCNC: 3.9 G/DL (ref 3.8–4.8)
ALBUMIN/CREAT UR: 4 MG/G CREAT (ref 0–29)
ALP SERPL-CCNC: 85 IU/L (ref 44–121)
ALT SERPL-CCNC: 7 IU/L (ref 0–32)
AST SERPL-CCNC: 16 IU/L (ref 0–40)
BASOPHILS # BLD AUTO: 0 X10E3/UL (ref 0–0.2)
BASOPHILS NFR BLD AUTO: 0 %
BILIRUB SERPL-MCNC: 0.4 MG/DL (ref 0–1.2)
BUN SERPL-MCNC: 14 MG/DL (ref 8–27)
BUN/CREAT SERPL: 17 (ref 12–28)
CALCIUM SERPL-MCNC: 9 MG/DL (ref 8.7–10.3)
CHLORIDE SERPL-SCNC: 104 MMOL/L (ref 96–106)
CO2 SERPL-SCNC: 22 MMOL/L (ref 20–29)
CREAT SERPL-MCNC: 0.84 MG/DL (ref 0.57–1)
CREAT UR-MCNC: 116.8 MG/DL
EGFRCR SERPLBLD CKD-EPI 2021: 71 ML/MIN/1.73
EOSINOPHIL # BLD AUTO: 0.2 X10E3/UL (ref 0–0.4)
EOSINOPHIL NFR BLD AUTO: 3 %
ERYTHROCYTE [DISTWIDTH] IN BLOOD BY AUTOMATED COUNT: 13 % (ref 11.7–15.4)
GLOBULIN SER CALC-MCNC: 2.7 G/DL (ref 1.5–4.5)
GLUCOSE SERPL-MCNC: 95 MG/DL (ref 70–99)
HBA1C MFR BLD: 6.5 % (ref 4.8–5.6)
HCT VFR BLD AUTO: 34.8 % (ref 34–46.6)
HGB BLD-MCNC: 11 G/DL (ref 11.1–15.9)
IMM GRANULOCYTES # BLD AUTO: 0 X10E3/UL (ref 0–0.1)
IMM GRANULOCYTES NFR BLD AUTO: 0 %
LYMPHOCYTES # BLD AUTO: 2.5 X10E3/UL (ref 0.7–3.1)
LYMPHOCYTES NFR BLD AUTO: 31 %
MCH RBC QN AUTO: 29.1 PG (ref 26.6–33)
MCHC RBC AUTO-ENTMCNC: 31.6 G/DL (ref 31.5–35.7)
MCV RBC AUTO: 92 FL (ref 79–97)
MICROALBUMIN UR-MCNC: 5.1 UG/ML
MONOCYTES # BLD AUTO: 0.4 X10E3/UL (ref 0.1–0.9)
MONOCYTES NFR BLD AUTO: 5 %
NEUTROPHILS # BLD AUTO: 4.9 X10E3/UL (ref 1.4–7)
NEUTROPHILS NFR BLD AUTO: 61 %
PLATELET # BLD AUTO: 230 X10E3/UL (ref 150–450)
POTASSIUM SERPL-SCNC: 3.9 MMOL/L (ref 3.5–5.2)
PROT SERPL-MCNC: 6.6 G/DL (ref 6–8.5)
RBC # BLD AUTO: 3.78 X10E6/UL (ref 3.77–5.28)
SODIUM SERPL-SCNC: 143 MMOL/L (ref 134–144)
SPECIMEN STATUS REPORT: NORMAL
WBC # BLD AUTO: 8 X10E3/UL (ref 3.4–10.8)

## 2024-10-21 ENCOUNTER — OFFICE VISIT (OUTPATIENT)
Facility: CLINIC | Age: 79
End: 2024-10-21
Payer: MEDICARE

## 2024-10-21 VITALS
WEIGHT: 180 LBS | BODY MASS INDEX: 29.99 KG/M2 | DIASTOLIC BLOOD PRESSURE: 81 MMHG | HEIGHT: 65 IN | RESPIRATION RATE: 16 BRPM | OXYGEN SATURATION: 98 % | HEART RATE: 65 BPM | TEMPERATURE: 98.6 F | SYSTOLIC BLOOD PRESSURE: 161 MMHG

## 2024-10-21 DIAGNOSIS — E66.3 OVERWEIGHT (BMI 25.0-29.9): ICD-10-CM

## 2024-10-21 DIAGNOSIS — E11.9 TYPE 2 DIABETES MELLITUS WITHOUT COMPLICATION, WITHOUT LONG-TERM CURRENT USE OF INSULIN (HCC): ICD-10-CM

## 2024-10-21 DIAGNOSIS — I10 ESSENTIAL HYPERTENSION: ICD-10-CM

## 2024-10-21 DIAGNOSIS — R60.9 EDEMA, UNSPECIFIED TYPE: Primary | ICD-10-CM

## 2024-10-21 DIAGNOSIS — E04.2 MULTINODULAR GOITER: ICD-10-CM

## 2024-10-21 DIAGNOSIS — E78.5 DYSLIPIDEMIA: ICD-10-CM

## 2024-10-21 PROCEDURE — 99214 OFFICE O/P EST MOD 30 MIN: CPT | Performed by: INTERNAL MEDICINE

## 2024-10-21 PROCEDURE — 3079F DIAST BP 80-89 MM HG: CPT | Performed by: INTERNAL MEDICINE

## 2024-10-21 PROCEDURE — 1123F ACP DISCUSS/DSCN MKR DOCD: CPT | Performed by: INTERNAL MEDICINE

## 2024-10-21 PROCEDURE — G2211 COMPLEX E/M VISIT ADD ON: HCPCS | Performed by: INTERNAL MEDICINE

## 2024-10-21 PROCEDURE — 3044F HG A1C LEVEL LT 7.0%: CPT | Performed by: INTERNAL MEDICINE

## 2024-10-21 PROCEDURE — 3077F SYST BP >= 140 MM HG: CPT | Performed by: INTERNAL MEDICINE

## 2024-10-21 RX ORDER — FUROSEMIDE 20 MG/1
20 TABLET ORAL DAILY PRN
Qty: 90 TABLET | Refills: 0 | Status: SHIPPED | OUTPATIENT
Start: 2024-10-21

## 2024-10-21 RX ORDER — ROSUVASTATIN CALCIUM 20 MG/1
20 TABLET, COATED ORAL DAILY
Qty: 90 TABLET | Refills: 3 | Status: SHIPPED | OUTPATIENT
Start: 2024-10-21

## 2024-10-21 RX ORDER — LOSARTAN POTASSIUM 50 MG/1
50 TABLET ORAL DAILY
Qty: 90 TABLET | Refills: 1 | Status: SHIPPED | OUTPATIENT
Start: 2024-10-21

## 2024-10-21 NOTE — PROGRESS NOTES
Emmy Schwarz presents today for   Chief Complaint   Patient presents with    4 Month Follow-Up    Hypertension    Diabetes     Accompanied by spouse.    \"Have you been to the ER, urgent care clinic since your last visit?  Hospitalized since your last visit?\"    YES - When: approximately 3 months ago.  Where and Why: [facility], hypertension.    “Have you seen or consulted any other health care providers outside of [facility] since your last visit?”    NO             
(GLUCOPHAGE-XR) 500 MG extended release tablet Take 4 tablets by mouth daily    vitamin B-12 (CYANOCOBALAMIN) 1000 MCG tablet Take 1 tablet by mouth daily    blood glucose monitor kit and supplies Dispense sufficient amount for indicated testing frequency plus additional to accommodate PRN testing needs. Dispense all needed supplies to include: monitor, strips, lancing device, lancets, control solutions, alcohol swabs.    fluticasone (FLONASE) 50 MCG/ACT nasal spray USE 2 SPRAYS IN EACH NOSTRIL DAILY     No current facility-administered medications for this visit.     Allergies   Allergen Reactions    Amlodipine Swelling    Codeine Itching     \"Loopy\"     REVIEW OF SYSTEMS: mammo 1/24, DEXA 3/14 declined further, colo 4/22 Dr Joshi, sees Dr Barry    Vitals:    10/21/24 1357 10/21/24 1406   BP: (!) 168/83 (!) 161/81   Pulse: 66 65   Resp: 16    Temp: 98.6 °F (37 °C)    TempSrc: Temporal    SpO2: 98%    Weight: 81.6 kg (180 lb)    Height: 1.651 m (5' 5\")    A&O x3  Affect is appropriate.  Mood stable  No apparent distress  Anicteric, no JVD, adenopathy or thyromegaly.   No carotid bruits or radiated murmur  Lungs clear to auscultation, no wheezes or rales  Heart showed regular rate and rhythm. No murmur, rubs, gallops  Abdomen soft nontender, no hepatosplenomegaly or masses.   Extremities with 1+ ankle edema. Varicosities noted bilat.  Pulses 1-2+ symmetrically.     LABS  From 12/08                                        ua neg  From 7/09 showed   gluc 98,   cr 0.90,     alt 10,           chol 223, tg 75, hdl 71, ldl-c 137  From 12/11 showed                ldl-p 1525, chol 247, tg 61, hdl 78, ldl-c 157, wbc 7.6, hb 11.7, plt 265  From 6/12 showed                    ldl-p 1177, chol 238, tg 48, hdl 78, ldl-c 152,             tsh 1.81  From 1/13 showed   gluc 99,   cr 0.95, gfr 72,  alt<5,  ldl-p 1238, chol 206, tg 56, hdl 75, ldl-c 120, wbc 8.2, hb 11.4, plt 259  From 7/13 showed                chol 221, tg 72,

## 2024-10-24 RX ORDER — LOSARTAN POTASSIUM 50 MG/1
50 TABLET ORAL DAILY
Qty: 30 TABLET | Refills: 0 | Status: SHIPPED | OUTPATIENT
Start: 2024-10-24

## 2024-10-24 NOTE — TELEPHONE ENCOUNTER
Patient was put back on medication but has none; prescription sent to mail order and has not been received. Please send a short supply to [facility], per patient request, due to elevated blood pressure.

## 2024-11-15 DIAGNOSIS — E11.9 TYPE 2 DIABETES MELLITUS WITHOUT COMPLICATION, WITHOUT LONG-TERM CURRENT USE OF INSULIN (HCC): ICD-10-CM

## 2024-11-15 RX ORDER — METFORMIN HYDROCHLORIDE 500 MG/1
500 TABLET, EXTENDED RELEASE ORAL DAILY
Qty: 90 TABLET | Refills: 3 | Status: SHIPPED | OUTPATIENT
Start: 2024-11-15

## 2024-11-18 NOTE — PROGRESS NOTES
for her physical activity.  Will maintain her current tx and will reassess with SMBG logs in 3 months.    Today:   Pt has gained 10lbs since the last visit.  She has not restarted exercise at the [facility] as planned during the prior visit.  Counseled on the importance of increasing her physical activity.  She states that she has an exercise bike at home that she has not been using it.  She has not changed her diet.    Current anti-hyperglycemic regimen includes:    Key Antihyperglycemic Medications               metFORMIN (GLUCOPHAGE-XR) 500 MG extended release tablet (Taking) Take 4 tablets by mouth daily          Complete current medication regimen includes:  Current Outpatient Medications   Medication Sig    metFORMIN (GLUCOPHAGE-XR) 500 MG extended release tablet Take 4 tablets by mouth daily    losartan (COZAAR) 50 MG tablet Take 1 tablet by mouth daily    rosuvastatin (CRESTOR) 20 MG tablet Take 1 tablet by mouth daily    losartan (COZAAR) 50 MG tablet Take 1 tablet by mouth daily    furosemide (LASIX) 20 MG tablet Take 1 tablet by mouth daily as needed (swelling)    vitamin B-12 (CYANOCOBALAMIN) 1000 MCG tablet Take 1 tablet by mouth daily    blood glucose monitor kit and supplies Dispense sufficient amount for indicated testing frequency plus additional to accommodate PRN testing needs. Dispense all needed supplies to include: monitor, strips, lancing device, lancets, control solutions, alcohol swabs.    fluticasone (FLONASE) 50 MCG/ACT nasal spray USE 2 SPRAYS IN EACH NOSTRIL DAILY     No current facility-administered medications for this visit.     Allergies:  Allergies   Allergen Reactions    Amlodipine Swelling    Codeine Itching     \"Loopy\"       Blood Glucose Monitoring (BGM) or CGM:            ROS:  Today, Pt endorses:  - Symptoms of Hyperglycemia: none  - Symptoms of Hypoglycemia: none    Lifestyle modification(s):  - none    Medication Adherence/Access:  - Endorses adherence to current regimen?:

## 2024-11-25 ENCOUNTER — PHARMACY VISIT (OUTPATIENT)
Facility: CLINIC | Age: 79
End: 2024-11-25

## 2024-11-25 DIAGNOSIS — E11.9 TYPE 2 DIABETES MELLITUS WITHOUT COMPLICATION, WITHOUT LONG-TERM CURRENT USE OF INSULIN (HCC): ICD-10-CM

## 2024-11-25 RX ORDER — METFORMIN HYDROCHLORIDE 500 MG/1
2000 TABLET, EXTENDED RELEASE ORAL DAILY
Qty: 360 TABLET | Refills: 3 | Status: SHIPPED | OUTPATIENT
Start: 2024-11-25

## 2024-12-23 ENCOUNTER — TRANSCRIBE ORDERS (OUTPATIENT)
Facility: HOSPITAL | Age: 79
End: 2024-12-23

## 2024-12-23 DIAGNOSIS — Z12.31 VISIT FOR SCREENING MAMMOGRAM: Primary | ICD-10-CM

## 2025-01-06 DIAGNOSIS — R60.9 EDEMA, UNSPECIFIED TYPE: ICD-10-CM

## 2025-01-06 RX ORDER — FUROSEMIDE 20 MG/1
TABLET ORAL
Qty: 90 TABLET | Refills: 3 | Status: SHIPPED | OUTPATIENT
Start: 2025-01-06

## 2025-01-08 ENCOUNTER — HOSPITAL ENCOUNTER (OUTPATIENT)
Facility: HOSPITAL | Age: 80
Discharge: HOME | End: 2025-01-11
Attending: INTERNAL MEDICINE
Payer: MEDICARE

## 2025-01-08 VITALS — BODY MASS INDEX: 29.95 KG/M2 | HEIGHT: 65 IN

## 2025-01-08 DIAGNOSIS — Z12.31 VISIT FOR SCREENING MAMMOGRAM: ICD-10-CM

## 2025-01-08 PROCEDURE — 77063 BREAST TOMOSYNTHESIS BI: CPT

## 2025-02-13 NOTE — PROGRESS NOTES
79 y.o. female who presents for evaluation.      She stopped going to the [facility] but recently signed up to go back and actually is hiring a .  Bp has been controlled, no swelling and using the lasix prn. She has maintained the wt loss     7/11/2022 1/16/2023 7/25/2023 12/12/2023 1/29/2024 6/3/2024   Vitals         Weight - Scale 212 lb  207 lb  207 lb 2 oz   195 lb  179 lb       7/19/2024 10/21/2024 1/8/2025 2/24/2025   Vitals       Weight - Scale 184 lb  180 lb   181 lb      No GI or  complaints    No sx referable to the thyroid    Denies polyuria, polydipsia, nocturia, vision change. She's doing well with the diet w wt loss as above.  Saw Dr Barry but reports he's retired    *LAST MEDICARE WELLNESS EXAM: 3/5/14, 8/27/15, 9/20/16, 10/17/17, 10/29/18, 12/5/19, 12/28/20, 12/13/21, 1/16/23, 1/29/24, 2/24/25    Past Medical History:   Diagnosis Date    Allergic rhinitis     Anemia     chronic    Chronic constipation     DM (diabetes mellitus) (HCC) 01/2024    on basis of a1c    Dyslipidemia     10 year calculated risk score was 10.3% (7/13); 11.3% (10/14); 15.2% (3/16)    FHx: heart disease     Fibrocystic breast     neg bx x2 1970s    GERD (gastroesophageal reflux disease)     Hypertension     IFG (impaired fasting glucose) 10/31/2014    Morbid obesity     peak weight 230 lbs, bmi 38.8 from 10/11; IF start weight 209 lbs but not doing    Multinodular goiter     neg bx Dr Sue 2007    Osteoarthritis, knee     Dr. Nava    Varicose vein of leg 2023     Past Surgical History:   Procedure Laterality Date    APPENDECTOMY      BREAST BIOPSY Bilateral     1967 (RT)/ 1971 (LT)    CATARACT REMOVAL Bilateral     COLONOSCOPY N/A 7/11/2022    Dr. Mendoza neg (2002); Dr. Joshi neg (4/3/12); (7/11/22) neg    DEXA BONE DENSITY AXIAL SKELETON      DEXA t score 0.2 spine, -0.2 hip (9/09); -0.1 spine, -0.2 hip (3/14)    DILATION AND CURETTAGE OF UTERUS      x2     HEMORRHOID SURGERY      HYSTERECTOMY (CERVIX

## 2025-02-17 ENCOUNTER — LAB (OUTPATIENT)
Facility: CLINIC | Age: 80
End: 2025-02-17

## 2025-02-17 DIAGNOSIS — E11.9 TYPE 2 DIABETES MELLITUS WITHOUT COMPLICATION, WITHOUT LONG-TERM CURRENT USE OF INSULIN (HCC): ICD-10-CM

## 2025-02-18 LAB
BUN SERPL-MCNC: 13 MG/DL (ref 8–27)
BUN/CREAT SERPL: 17 (ref 12–28)
CHLORIDE SERPL-SCNC: 105 MMOL/L (ref 96–106)
CHOLEST SERPL-MCNC: 166 MG/DL (ref 100–199)
CO2 SERPL-SCNC: 24 MMOL/L (ref 20–29)
CREAT SERPL-MCNC: 0.75 MG/DL (ref 0.57–1)
EGFRCR SERPLBLD CKD-EPI 2021: 81 ML/MIN/1.73
GLUCOSE SERPL-MCNC: 95 MG/DL (ref 70–99)
HBA1C MFR BLD: 6.2 % (ref 4.8–5.6)
HDLC SERPL-MCNC: 81 MG/DL
LDLC SERPL CALC-MCNC: 73 MG/DL (ref 0–99)
POTASSIUM SERPL-SCNC: 3.5 MMOL/L (ref 3.5–5.2)
SODIUM SERPL-SCNC: 143 MMOL/L (ref 134–144)
SPECIMEN STATUS REPORT: NORMAL
TRIGL SERPL-MCNC: 61 MG/DL (ref 0–149)
VLDLC SERPL CALC-MCNC: 12 MG/DL (ref 5–40)

## 2025-02-24 ENCOUNTER — OFFICE VISIT (OUTPATIENT)
Facility: CLINIC | Age: 80
End: 2025-02-24

## 2025-02-24 VITALS
BODY MASS INDEX: 30.16 KG/M2 | TEMPERATURE: 98.7 F | DIASTOLIC BLOOD PRESSURE: 71 MMHG | OXYGEN SATURATION: 100 % | SYSTOLIC BLOOD PRESSURE: 136 MMHG | RESPIRATION RATE: 16 BRPM | HEART RATE: 66 BPM | HEIGHT: 65 IN | WEIGHT: 181 LBS

## 2025-02-24 DIAGNOSIS — K21.9 GERD WITHOUT ESOPHAGITIS: ICD-10-CM

## 2025-02-24 DIAGNOSIS — E78.5 DYSLIPIDEMIA: ICD-10-CM

## 2025-02-24 DIAGNOSIS — I10 ESSENTIAL HYPERTENSION: ICD-10-CM

## 2025-02-24 DIAGNOSIS — E11.9 TYPE 2 DIABETES MELLITUS WITHOUT COMPLICATION, WITHOUT LONG-TERM CURRENT USE OF INSULIN (HCC): ICD-10-CM

## 2025-02-24 DIAGNOSIS — Z00.00 MEDICARE ANNUAL WELLNESS VISIT, SUBSEQUENT: Primary | ICD-10-CM

## 2025-02-24 DIAGNOSIS — E04.2 MULTINODULAR GOITER: ICD-10-CM

## 2025-02-24 DIAGNOSIS — Z71.89 ADVANCED CARE PLANNING/COUNSELING DISCUSSION: ICD-10-CM

## 2025-02-24 SDOH — ECONOMIC STABILITY: FOOD INSECURITY: WITHIN THE PAST 12 MONTHS, YOU WORRIED THAT YOUR FOOD WOULD RUN OUT BEFORE YOU GOT MONEY TO BUY MORE.: NEVER TRUE

## 2025-02-24 SDOH — ECONOMIC STABILITY: FOOD INSECURITY: WITHIN THE PAST 12 MONTHS, THE FOOD YOU BOUGHT JUST DIDN'T LAST AND YOU DIDN'T HAVE MONEY TO GET MORE.: NEVER TRUE

## 2025-02-24 ASSESSMENT — PATIENT HEALTH QUESTIONNAIRE - PHQ9
2. FEELING DOWN, DEPRESSED OR HOPELESS: NOT AT ALL
SUM OF ALL RESPONSES TO PHQ9 QUESTIONS 1 & 2: 0
SUM OF ALL RESPONSES TO PHQ QUESTIONS 1-9: 0
SUM OF ALL RESPONSES TO PHQ QUESTIONS 1-9: 0
1. LITTLE INTEREST OR PLEASURE IN DOING THINGS: NOT AT ALL
SUM OF ALL RESPONSES TO PHQ QUESTIONS 1-9: 0
SUM OF ALL RESPONSES TO PHQ QUESTIONS 1-9: 0

## 2025-02-24 NOTE — ACP (ADVANCE CARE PLANNING)
Advance Care Planning     Advance Care Planning (ACP) Physician/NP/PA Conversation    Date of Conversation: 2/24/2025  Conducted with: Patient with Decision Making Capacity    Healthcare Decision Maker:      Primary Decision Maker: Alfredo Schwarz - Spouse - [phone]    Click here to complete Healthcare Decision Makers including selection of the Healthcare Decision Maker Relationship (ie \"Primary\")  Today we documented Decision Maker(s) consistent with Legal Next of Kin hierarchy.    Care Preferences:    Hospitalization:  \"If your health worsens and it becomes clear that your chance of recovery is unlikely, what would be your preference regarding hospitalization?\"  The patient would prefer hospitalization.    Ventilation:  \"If you were unable to breath on your own and your chance of recovery was unlikely, what would be your preference about the use of a ventilator (breathing machine) if it was available to you?\"  The patient would desire the use of a ventilator.    Resuscitation:  \"In the event your heart stopped as a result of an underlying serious health condition, would you want attempts made to restart your heart, or would you prefer a natural death?\"  Yes, attempt to resuscitate.    ventilation preferences, hospitalization preferences, and resuscitation preferences    Conversation Outcomes / Follow-Up Plan:  ACP in process - information provided, considering goals and options  Reviewed DNR/DNI and patient elects Full Code (Attempt Resuscitation)    Length of Voluntary ACP Conversation in minutes:  16 minutes    CAMILLE ROMERO MD

## 2025-02-24 NOTE — PROGRESS NOTES
Emmy Schwarz presents today for   Chief Complaint   Patient presents with    Medicare AWV       \"Have you been to the ER, urgent care clinic since your last visit?  Hospitalized since your last visit?\"    NO    “Have you seen or consulted any other health care providers outside of [facility] since your last visit?”    NO

## 2025-02-24 NOTE — PROGRESS NOTES
Medicare Annual Wellness Visit    Emmy Schwarz is here for Medicare AWV    Assessment & Plan   Type 2 diabetes mellitus without complication, without long-term current use of insulin (HCC)  -     CBC with Auto Differential; Future  -     Comprehensive Metabolic Panel; Future  -     HEMOGLOBIN A1C W/O EAG; Future  -     Albumin/Creatinine Ratio, Urine; Future  Advanced care planning/counseling discussion  Essential hypertension  GERD without esophagitis  Multinodular goiter  Dyslipidemia  Medicare annual wellness visit, subsequent       Return in 6 months (on 8/24/2025).     Subjective       Patient's complete Health Risk Assessment and screening values have been reviewed and are found in Flowsheets. The following problems were reviewed today and where indicated follow up appointments were made and/or referrals ordered.    Positive Risk Factor Screenings with Interventions:              Inactivity:  On average, how many days per week do you engage in moderate to strenuous exercise (like a brisk walk)?: 1 day (!) Abnormal  On average, how many minutes do you engage in exercise at this level?: 30 min  Interventions:  Patient declined any further interventions or treatment     Abnormal BMI (obese):  Body mass index is 30.12 kg/m². (!) Abnormal  Interventions:  Patient declines any further evaluation or treatment                           Objective   Vitals:    02/24/25 0846   BP: 136/71   Pulse: 66   Resp: 16   Temp: 98.7 °F (37.1 °C)   TempSrc: Temporal   SpO2: 100%   Weight: 82.1 kg (181 lb)   Height: 1.651 m (5' 5\")      Body mass index is 30.12 kg/m².                    Allergies   Allergen Reactions    Amlodipine Swelling    Codeine Itching     \"Loopy\"     Prior to Visit Medications    Medication Sig Taking? Authorizing Provider   furosemide (LASIX) 20 MG tablet TAKE ONE TABLET BY MOUTH EVERY DAY AS NEEDED FOR SWELLING Yes Basilio Means MD   metFORMIN (GLUCOPHAGE-XR) 500 MG extended release tablet Take

## 2025-02-24 NOTE — PATIENT INSTRUCTIONS
Learning About Being Active as an Older Adult  Why is being active important as you get older?     Being active is one of the best things you can do for your health. And it's never too late to start. Being active--or getting active, if you aren't already--has definite benefits. It can:  Give you more energy,  Keep your mind sharp.  Improve balance to reduce your risk of falls.  Help you manage chronic illness with fewer medicines.  No matter how old you are, how fit you are, or what health problems you have, there is a form of activity that will work for you. And the more physical activity you can do, the better your overall health will be.  What kinds of activity can help you stay healthy?  Being more active will make your daily activities easier. Physical activity includes planned exercise and things you do in daily life. There are four types of activity:  Aerobic.  Doing aerobic activity makes your heart and lungs strong.  Includes walking, dancing, and gardening.  Aim for at least 2½ hours spread throughout the week.  It improves your energy and can help you sleep better.  Muscle-strengthening.  This type of activity can help maintain muscle and strengthen bones.  Includes climbing stairs, using resistance bands, and lifting or carrying heavy loads.  Aim for at least twice a week.  It can help protect the knees and other joints.  Stretching.  Stretching gives you better range of motion in joints and muscles.  Includes upper arm stretches, calf stretches, and gentle yoga.  Aim for at least twice a week, preferably after your muscles are warmed up from other activities.  It can help you function better in daily life.  Balancing.  This helps you stay coordinated and have good posture.  Includes heel-to-toe walking, camron chi, and certain types of yoga.  Aim for at least 3 days a week.  It can reduce your risk of falling.  Even if you have a hard time meeting the recommendations, it's better to be more active

## 2025-03-25 DIAGNOSIS — I10 ESSENTIAL HYPERTENSION: ICD-10-CM

## 2025-03-25 NOTE — TELEPHONE ENCOUNTER
PCP: Basilio Means MD    LAST OFFICE VISIT: 02/24/2025    LAST REFILL PER CHART:  Medication:losartan (COZAAR) 50 MG tablet   Ordered On:10/21/2024  Instructions:Take 1 tablet by mouth daily   Dispense:90 tablets  Refills:1      Future Appointments   Date Time Provider Department Center   4/28/2025  9:00 AM Tejinder Saab [facility] DEP   8/20/2025  8:00 AM [facility]OC LAB VISIT [facility] DEP   8/27/2025  8:20 AM Basilio Means MD [facility] DEP

## 2025-03-27 RX ORDER — LOSARTAN POTASSIUM 50 MG/1
50 TABLET ORAL DAILY
Qty: 90 TABLET | Refills: 1 | Status: SHIPPED | OUTPATIENT
Start: 2025-03-27

## 2025-04-23 NOTE — PROGRESS NOTES
Pharmacy Progress Note - Diabetes Management       Assessment / Plan:   Diabetes Management:  Per ADA guidelines, Pt's A1c is at goal of < 7%.  Pt's glycemic control is maintained on her current tx.  The majority of her FBG values are at goal with the occasional excursion just above.  All of her NFBG values are at goal.  Encouraged to follow through with her planned lifestyle changes to contribute to added weight loss and improvement in glycemic control.  Will reassess with SMBG logs at follow up in 6 months.    Hyperlipidemia:  The 10-year ASCVD risk score (Jose G JACINTO, et al., 2019) is: 42.9% based on parameters listed. Current lipid treatment guidelines recommend at least moderate-intensity statin doses for all patients with diabetes to decrease overall ASCVD risk. Patient currently qualifies for a high intensity statin therapy based on current recommendations and is currently taking a high intensity statin.      Nutrition/Lifestyle Modifications:  - Educated pt on the importance of moderating carbohydrate intake. Reviewed sources of carbohydrates and method to help determine appropriate portion sizes (e.g., Diabetes Plate Method).  - Advised patient to avoid sugar-sweetened beverages and replace with water or diet/zero sugar option.  - Recommend ~30 minutes consistent, moderately intensive, exercise/day or ~150 minutes/week. Start small, stay consistent, and increase length and types of exercise, as tolerated.       Patient will return to clinic in 6 month(s) for follow up.        S/O: Ms. Emmy Schwarz, a 79 y.o. female referred by Basilio Means MD,  has a past medical history of Allergic rhinitis, Anemia, Chronic constipation, DM (diabetes mellitus) (HCC), Dyslipidemia, FHx: heart disease, Fibrocystic breast, GERD (gastroesophageal reflux disease), Hypertension, IFG (impaired fasting glucose), Morbid obesity (HCC), Multinodular goiter, Osteoarthritis, knee, and Varicose vein of leg.  Pt was seen today

## 2025-04-28 ENCOUNTER — PHARMACY VISIT (OUTPATIENT)
Facility: CLINIC | Age: 80
End: 2025-04-28

## 2025-04-28 DIAGNOSIS — E11.9 TYPE 2 DIABETES MELLITUS WITHOUT COMPLICATION, WITHOUT LONG-TERM CURRENT USE OF INSULIN (HCC): Primary | ICD-10-CM

## 2025-08-18 ENCOUNTER — LAB (OUTPATIENT)
Facility: CLINIC | Age: 80
End: 2025-08-18

## 2025-08-18 DIAGNOSIS — E11.9 TYPE 2 DIABETES MELLITUS WITHOUT COMPLICATION, WITHOUT LONG-TERM CURRENT USE OF INSULIN (HCC): ICD-10-CM

## 2025-08-20 LAB
ALBUMIN SERPL-MCNC: 3.9 G/DL (ref 3.8–4.8)
ALBUMIN/CREAT UR: 19 MG/G CREAT (ref 0–29)
ALP SERPL-CCNC: 88 IU/L (ref 44–121)
ALT SERPL-CCNC: 10 IU/L (ref 0–32)
AST SERPL-CCNC: 16 IU/L (ref 0–40)
BASOPHILS # BLD AUTO: 0 X10E3/UL (ref 0–0.2)
BASOPHILS NFR BLD AUTO: 0 %
BILIRUB SERPL-MCNC: 0.3 MG/DL (ref 0–1.2)
BUN SERPL-MCNC: 15 MG/DL (ref 8–27)
BUN/CREAT SERPL: 18 (ref 12–28)
CALCIUM SERPL-MCNC: 9 MG/DL (ref 8.7–10.3)
CHLORIDE SERPL-SCNC: 105 MMOL/L (ref 96–106)
CO2 SERPL-SCNC: 25 MMOL/L (ref 20–29)
CREAT SERPL-MCNC: 0.82 MG/DL (ref 0.57–1)
CREAT UR-MCNC: 62.1 MG/DL
EGFRCR SERPLBLD CKD-EPI 2021: 73 ML/MIN/1.73
EOSINOPHIL # BLD AUTO: 0.2 X10E3/UL (ref 0–0.4)
EOSINOPHIL NFR BLD AUTO: 3 %
ERYTHROCYTE [DISTWIDTH] IN BLOOD BY AUTOMATED COUNT: 12.8 % (ref 11.7–15.4)
GLOBULIN SER CALC-MCNC: 2.3 G/DL (ref 1.5–4.5)
GLUCOSE SERPL-MCNC: 103 MG/DL (ref 70–99)
HBA1C MFR BLD: 5.8 % (ref 4.8–5.6)
HCT VFR BLD AUTO: 37.1 % (ref 34–46.6)
HGB BLD-MCNC: 11.5 G/DL (ref 11.1–15.9)
IMM GRANULOCYTES # BLD AUTO: 0 X10E3/UL (ref 0–0.1)
IMM GRANULOCYTES NFR BLD AUTO: 0 %
LYMPHOCYTES # BLD AUTO: 2.4 X10E3/UL (ref 0.7–3.1)
LYMPHOCYTES NFR BLD AUTO: 37 %
MCH RBC QN AUTO: 29.2 PG (ref 26.6–33)
MCHC RBC AUTO-ENTMCNC: 31 G/DL (ref 31.5–35.7)
MCV RBC AUTO: 94 FL (ref 79–97)
MICROALBUMIN UR-MCNC: 11.8 UG/ML
MONOCYTES # BLD AUTO: 0.5 X10E3/UL (ref 0.1–0.9)
MONOCYTES NFR BLD AUTO: 7 %
NEUTROPHILS # BLD AUTO: 3.4 X10E3/UL (ref 1.4–7)
NEUTROPHILS NFR BLD AUTO: 53 %
PLATELET # BLD AUTO: 193 X10E3/UL (ref 150–450)
POTASSIUM SERPL-SCNC: 3.6 MMOL/L (ref 3.5–5.2)
PROT SERPL-MCNC: 6.2 G/DL (ref 6–8.5)
RBC # BLD AUTO: 3.94 X10E6/UL (ref 3.77–5.28)
SODIUM SERPL-SCNC: 142 MMOL/L (ref 134–144)
SPECIMEN STATUS REPORT: NORMAL
WBC # BLD AUTO: 6.5 X10E3/UL (ref 3.4–10.8)

## 2025-08-27 ENCOUNTER — OFFICE VISIT (OUTPATIENT)
Facility: CLINIC | Age: 80
End: 2025-08-27

## 2025-08-27 VITALS
HEIGHT: 65 IN | RESPIRATION RATE: 16 BRPM | TEMPERATURE: 98.2 F | SYSTOLIC BLOOD PRESSURE: 136 MMHG | HEART RATE: 88 BPM | WEIGHT: 183 LBS | BODY MASS INDEX: 30.49 KG/M2 | DIASTOLIC BLOOD PRESSURE: 76 MMHG | OXYGEN SATURATION: 100 %

## 2025-08-27 DIAGNOSIS — E78.5 DYSLIPIDEMIA: ICD-10-CM

## 2025-08-27 DIAGNOSIS — I10 ESSENTIAL HYPERTENSION: Primary | ICD-10-CM

## 2025-08-27 DIAGNOSIS — E04.2 MULTINODULAR GOITER: ICD-10-CM

## 2025-08-27 DIAGNOSIS — E11.9 TYPE 2 DIABETES MELLITUS WITHOUT COMPLICATION, WITHOUT LONG-TERM CURRENT USE OF INSULIN (HCC): ICD-10-CM

## (undated) DEVICE — CATHETER SUCT TR FL TIP 14FR W/ O CTRL

## (undated) DEVICE — SYR 10ML LUER LOK 1/5ML GRAD --

## (undated) DEVICE — ENDOSCOPY PUMP TUBING/ CAP SET: Brand: ERBE

## (undated) DEVICE — YANKAUER,SMOOTH HANDLE,HIGH CAPACITY: Brand: MEDLINE INDUSTRIES, INC.

## (undated) DEVICE — MEDI-VAC NON-CONDUCTIVE SUCTION TUBING: Brand: CARDINAL HEALTH

## (undated) DEVICE — SOLUTION IRRIG 1000ML H2O STRL BLT

## (undated) DEVICE — GOWN ISOL IMPERV UNIV, DISP, OPEN BACK, BLUE --

## (undated) DEVICE — SYRINGE MED 25GA 3ML L5/8IN SUBQ PLAS W/ DETACH NDL SFTY

## (undated) DEVICE — SYR 20ML LL STRL LF --

## (undated) DEVICE — FLUFF AND POLYMER UNDERPAD,EXTRA HEAVY: Brand: WINGS

## (undated) DEVICE — CANNULA ORIG TL CLR W FOAM CUSHIONS AND 14FT SUPL TB 3 CHN

## (undated) DEVICE — GAUZE,SPONGE,4"X4",16PLY,STRL,LF,10/TRAY: Brand: MEDLINE

## (undated) DEVICE — LINER SUCT CANSTR 3000CC PLAS SFT PRE ASSEMB W/OUT TBNG W/

## (undated) DEVICE — SYR 50ML SLIP TIP NSAF LF STRL --

## (undated) DEVICE — CANNULA NSL AD TBNG L14FT STD PVC O2 CRV CONN NONFLARED NSL